# Patient Record
Sex: FEMALE | Race: WHITE | URBAN - METROPOLITAN AREA
[De-identification: names, ages, dates, MRNs, and addresses within clinical notes are randomized per-mention and may not be internally consistent; named-entity substitution may affect disease eponyms.]

---

## 2017-05-30 ENCOUNTER — INPATIENT (INPATIENT)
Facility: HOSPITAL | Age: 72
LOS: 3 days | Discharge: HOME | End: 2017-06-03
Attending: INTERNAL MEDICINE

## 2017-05-30 DIAGNOSIS — M81.0 AGE-RELATED OSTEOPOROSIS WITHOUT CURRENT PATHOLOGICAL FRACTURE: ICD-10-CM

## 2017-05-30 DIAGNOSIS — I10 ESSENTIAL (PRIMARY) HYPERTENSION: ICD-10-CM

## 2017-05-30 DIAGNOSIS — J44.1 CHRONIC OBSTRUCTIVE PULMONARY DISEASE WITH (ACUTE) EXACERBATION: ICD-10-CM

## 2017-05-30 DIAGNOSIS — E03.9 HYPOTHYROIDISM, UNSPECIFIED: ICD-10-CM

## 2017-05-30 DIAGNOSIS — H81.10 BENIGN PAROXYSMAL VERTIGO, UNSPECIFIED EAR: ICD-10-CM

## 2017-05-30 DIAGNOSIS — E78.5 HYPERLIPIDEMIA, UNSPECIFIED: ICD-10-CM

## 2017-05-30 DIAGNOSIS — I95.1 ORTHOSTATIC HYPOTENSION: ICD-10-CM

## 2017-06-28 DIAGNOSIS — K21.9 GASTRO-ESOPHAGEAL REFLUX DISEASE WITHOUT ESOPHAGITIS: ICD-10-CM

## 2017-06-28 DIAGNOSIS — Z96.642 PRESENCE OF LEFT ARTIFICIAL HIP JOINT: ICD-10-CM

## 2017-06-28 DIAGNOSIS — Z87.891 PERSONAL HISTORY OF NICOTINE DEPENDENCE: ICD-10-CM

## 2017-06-28 DIAGNOSIS — E78.5 HYPERLIPIDEMIA, UNSPECIFIED: ICD-10-CM

## 2017-06-28 DIAGNOSIS — T46.1X5A ADVERSE EFFECT OF CALCIUM-CHANNEL BLOCKERS, INITIAL ENCOUNTER: ICD-10-CM

## 2017-06-28 DIAGNOSIS — E03.9 HYPOTHYROIDISM, UNSPECIFIED: ICD-10-CM

## 2017-06-28 DIAGNOSIS — I95.2 HYPOTENSION DUE TO DRUGS: ICD-10-CM

## 2017-06-28 DIAGNOSIS — H81.10 BENIGN PAROXYSMAL VERTIGO, UNSPECIFIED EAR: ICD-10-CM

## 2017-06-28 DIAGNOSIS — R42 DIZZINESS AND GIDDINESS: ICD-10-CM

## 2017-06-28 DIAGNOSIS — I10 ESSENTIAL (PRIMARY) HYPERTENSION: ICD-10-CM

## 2017-06-28 DIAGNOSIS — R26.81 UNSTEADINESS ON FEET: ICD-10-CM

## 2017-06-28 PROBLEM — Z00.00 ENCOUNTER FOR PREVENTIVE HEALTH EXAMINATION: Status: ACTIVE | Noted: 2017-06-28

## 2022-12-02 ENCOUNTER — OUTPATIENT (OUTPATIENT)
Dept: OUTPATIENT SERVICES | Facility: HOSPITAL | Age: 77
LOS: 1 days | Discharge: HOME | End: 2022-12-02

## 2022-12-02 VITALS
SYSTOLIC BLOOD PRESSURE: 131 MMHG | WEIGHT: 240.08 LBS | DIASTOLIC BLOOD PRESSURE: 73 MMHG | RESPIRATION RATE: 17 BRPM | HEART RATE: 88 BPM | HEIGHT: 63 IN | TEMPERATURE: 98 F | OXYGEN SATURATION: 99 %

## 2022-12-02 VITALS
OXYGEN SATURATION: 98 % | DIASTOLIC BLOOD PRESSURE: 61 MMHG | SYSTOLIC BLOOD PRESSURE: 131 MMHG | HEART RATE: 87 BPM | RESPIRATION RATE: 18 BRPM

## 2022-12-02 DIAGNOSIS — Z98.890 OTHER SPECIFIED POSTPROCEDURAL STATES: Chronic | ICD-10-CM

## 2022-12-02 NOTE — ASU PATIENT PROFILE, ADULT - NSICDXPASTSURGICALHX_GEN_ALL_CORE_FT
PAST SURGICAL HISTORY:  H/O hand surgery WRIST   SCREW AND AURE LEFT    History of repair of hip fracture AURE

## 2022-12-02 NOTE — ASU PATIENT PROFILE, ADULT - TEACHING/LEARNING RELIGIOUS CONSIDERATIONS
Please refer to the [facility] ultrasound report in Ob Procedures for additional information regarding the visit to the [facility], [facility]  today  none

## 2022-12-02 NOTE — ASU PATIENT PROFILE, ADULT - NSICDXPASTMEDICALHX_GEN_ALL_CORE_FT
PAST MEDICAL HISTORY:  GERD (gastroesophageal reflux disease)     H/O insomnia     HTN (hypertension)     Hypothyroid     Osteoarthritis feet    Vertigo

## 2022-12-02 NOTE — ASU PATIENT PROFILE, ADULT - FALL HARM RISK - RISK INTERVENTIONS

## 2022-12-02 NOTE — ASU DISCHARGE PLAN (ADULT/PEDIATRIC) - NS MD DC FALL RISK RISK
For information on Fall & Injury Prevention, visit: [email or web address] OR  [email or web address] OR  https://www.cdc.gov/steadi/patient.html

## 2022-12-07 DIAGNOSIS — H25.11 AGE-RELATED NUCLEAR CATARACT, RIGHT EYE: ICD-10-CM

## 2022-12-07 DIAGNOSIS — I10 ESSENTIAL (PRIMARY) HYPERTENSION: ICD-10-CM

## 2022-12-09 ENCOUNTER — TRANSCRIPTION ENCOUNTER (OUTPATIENT)
Age: 77
End: 2022-12-09

## 2022-12-09 ENCOUNTER — OUTPATIENT (OUTPATIENT)
Dept: OUTPATIENT SERVICES | Facility: HOSPITAL | Age: 77
LOS: 1 days | Discharge: HOME | End: 2022-12-09

## 2022-12-09 VITALS
SYSTOLIC BLOOD PRESSURE: 148 MMHG | RESPIRATION RATE: 17 BRPM | OXYGEN SATURATION: 98 % | DIASTOLIC BLOOD PRESSURE: 67 MMHG | HEART RATE: 87 BPM

## 2022-12-09 VITALS
WEIGHT: 240.08 LBS | DIASTOLIC BLOOD PRESSURE: 65 MMHG | SYSTOLIC BLOOD PRESSURE: 123 MMHG | RESPIRATION RATE: 17 BRPM | TEMPERATURE: 98 F | OXYGEN SATURATION: 97 % | HEART RATE: 93 BPM | HEIGHT: 63 IN

## 2022-12-09 DIAGNOSIS — Z98.890 OTHER SPECIFIED POSTPROCEDURAL STATES: Chronic | ICD-10-CM

## 2022-12-09 NOTE — ASU PATIENT PROFILE, ADULT - FALL HARM RISK - HARM RISK INTERVENTIONS
Assistance with ambulation/Assistance OOB with selected safe patient handling equipment/Communicate Risk of Fall with Harm to all staff/Discuss with provider need for PT consult/Monitor gait and stability/Provide patient with walking aids - walker, cane, crutches/Reinforce activity limits and safety measures with patient and family/Sit up slowly, dangle for a short time, stand at bedside before walking/Tailored Fall Risk Interventions/Visual Cue: Yellow wristband and red socks/Bed in lowest position, wheels locked, appropriate side rails in place/Call bell, personal items and telephone in reach/Instruct patient to call for assistance before getting out of bed or chair/Non-slip footwear when patient is out of bed/[place] to call system/Physically safe environment - no spills, clutter or unnecessary equipment/Purposeful Proactive Rounding/Room/bathroom lighting operational, light cord in reach

## 2022-12-09 NOTE — ASU PATIENT PROFILE, ADULT - NSICDXPASTMEDICALHX_GEN_ALL_CORE_FT
PAST MEDICAL HISTORY:  Cataract     GERD (gastroesophageal reflux disease)     H/O insomnia     HTN (hypertension)     Hypothyroid     Obese     Osteoarthritis feet    Vertigo

## 2022-12-09 NOTE — ASU DISCHARGE PLAN (ADULT/PEDIATRIC) - NS MD DC FALL RISK RISK
For information on Fall & Injury Prevention, visit: [email or web address] OR  [email or web address] OR  https://www.cdc.gov/steadi/patient.html

## 2022-12-14 DIAGNOSIS — I10 ESSENTIAL (PRIMARY) HYPERTENSION: ICD-10-CM

## 2022-12-14 DIAGNOSIS — E66.9 OBESITY, UNSPECIFIED: ICD-10-CM

## 2022-12-14 DIAGNOSIS — H25.89 OTHER AGE-RELATED CATARACT: ICD-10-CM

## 2022-12-14 DIAGNOSIS — M19.90 UNSPECIFIED OSTEOARTHRITIS, UNSPECIFIED SITE: ICD-10-CM

## 2022-12-14 DIAGNOSIS — R42 DIZZINESS AND GIDDINESS: ICD-10-CM

## 2022-12-14 DIAGNOSIS — G47.33 OBSTRUCTIVE SLEEP APNEA (ADULT) (PEDIATRIC): ICD-10-CM

## 2022-12-14 DIAGNOSIS — K21.9 GASTRO-ESOPHAGEAL REFLUX DISEASE WITHOUT ESOPHAGITIS: ICD-10-CM

## 2022-12-14 DIAGNOSIS — E03.9 HYPOTHYROIDISM, UNSPECIFIED: ICD-10-CM

## 2024-02-21 ENCOUNTER — EMERGENCY (EMERGENCY)
Facility: HOSPITAL | Age: 79
LOS: 0 days | Discharge: HOME | End: 2024-02-21
Attending: EMERGENCY MEDICINE
Payer: MEDICARE

## 2024-02-21 VITALS
HEART RATE: 95 BPM | WEIGHT: 250 LBS | TEMPERATURE: 98 F | DIASTOLIC BLOOD PRESSURE: 55 MMHG | OXYGEN SATURATION: 98 % | SYSTOLIC BLOOD PRESSURE: 103 MMHG | RESPIRATION RATE: 20 BRPM

## 2024-02-21 VITALS
OXYGEN SATURATION: 98 % | SYSTOLIC BLOOD PRESSURE: 113 MMHG | HEART RATE: 96 BPM | DIASTOLIC BLOOD PRESSURE: 72 MMHG | RESPIRATION RATE: 20 BRPM

## 2024-02-21 DIAGNOSIS — E03.9 HYPOTHYROIDISM, UNSPECIFIED: ICD-10-CM

## 2024-02-21 DIAGNOSIS — N39.0 URINARY TRACT INFECTION, SITE NOT SPECIFIED: ICD-10-CM

## 2024-02-21 DIAGNOSIS — R14.0 ABDOMINAL DISTENSION (GASEOUS): ICD-10-CM

## 2024-02-21 DIAGNOSIS — I10 ESSENTIAL (PRIMARY) HYPERTENSION: ICD-10-CM

## 2024-02-21 DIAGNOSIS — I48.91 UNSPECIFIED ATRIAL FIBRILLATION: ICD-10-CM

## 2024-02-21 DIAGNOSIS — Z98.890 OTHER SPECIFIED POSTPROCEDURAL STATES: Chronic | ICD-10-CM

## 2024-02-21 DIAGNOSIS — R06.02 SHORTNESS OF BREATH: ICD-10-CM

## 2024-02-21 DIAGNOSIS — K21.9 GASTRO-ESOPHAGEAL REFLUX DISEASE WITHOUT ESOPHAGITIS: ICD-10-CM

## 2024-02-21 PROBLEM — H26.9 UNSPECIFIED CATARACT: Chronic | Status: ACTIVE | Noted: 2022-12-09

## 2024-02-21 PROBLEM — E66.9 OBESITY, UNSPECIFIED: Chronic | Status: ACTIVE | Noted: 2022-12-09

## 2024-02-21 PROBLEM — R42 DIZZINESS AND GIDDINESS: Chronic | Status: ACTIVE | Noted: 2022-12-02

## 2024-02-21 PROBLEM — Z87.898 PERSONAL HISTORY OF OTHER SPECIFIED CONDITIONS: Chronic | Status: ACTIVE | Noted: 2022-12-02

## 2024-02-21 PROBLEM — M19.90 UNSPECIFIED OSTEOARTHRITIS, UNSPECIFIED SITE: Chronic | Status: ACTIVE | Noted: 2022-12-02

## 2024-02-21 LAB
ALBUMIN SERPL ELPH-MCNC: 4.3 G/DL — SIGNIFICANT CHANGE UP (ref 3.5–5.2)
ALP SERPL-CCNC: 60 U/L — SIGNIFICANT CHANGE UP (ref 30–115)
ALT FLD-CCNC: 10 U/L — SIGNIFICANT CHANGE UP (ref 0–41)
ANION GAP SERPL CALC-SCNC: 13 MMOL/L — SIGNIFICANT CHANGE UP (ref 7–14)
APPEARANCE UR: CLEAR — SIGNIFICANT CHANGE UP
AST SERPL-CCNC: 16 U/L — SIGNIFICANT CHANGE UP (ref 0–41)
BASOPHILS # BLD AUTO: 0.07 K/UL — SIGNIFICANT CHANGE UP (ref 0–0.2)
BASOPHILS NFR BLD AUTO: 0.9 % — SIGNIFICANT CHANGE UP (ref 0–1)
BILIRUB SERPL-MCNC: 0.3 MG/DL — SIGNIFICANT CHANGE UP (ref 0.2–1.2)
BILIRUB UR-MCNC: NEGATIVE — SIGNIFICANT CHANGE UP
BUN SERPL-MCNC: 13 MG/DL — SIGNIFICANT CHANGE UP (ref 10–20)
CALCIUM SERPL-MCNC: 9.3 MG/DL — SIGNIFICANT CHANGE UP (ref 8.4–10.5)
CHLORIDE SERPL-SCNC: 105 MMOL/L — SIGNIFICANT CHANGE UP (ref 98–110)
CO2 SERPL-SCNC: 23 MMOL/L — SIGNIFICANT CHANGE UP (ref 17–32)
COLOR SPEC: YELLOW — SIGNIFICANT CHANGE UP
CREAT SERPL-MCNC: 0.8 MG/DL — SIGNIFICANT CHANGE UP (ref 0.7–1.5)
DIFF PNL FLD: ABNORMAL
EGFR: 74 ML/MIN/1.73M2 — SIGNIFICANT CHANGE UP
EOSINOPHIL # BLD AUTO: 0.08 K/UL — SIGNIFICANT CHANGE UP (ref 0–0.7)
EOSINOPHIL NFR BLD AUTO: 1 % — SIGNIFICANT CHANGE UP (ref 0–8)
GLUCOSE SERPL-MCNC: 105 MG/DL — HIGH (ref 70–99)
GLUCOSE UR QL: NEGATIVE MG/DL — SIGNIFICANT CHANGE UP
HCT VFR BLD CALC: 41.6 % — SIGNIFICANT CHANGE UP (ref 37–47)
HGB BLD-MCNC: 13.4 G/DL — SIGNIFICANT CHANGE UP (ref 12–16)
IMM GRANULOCYTES NFR BLD AUTO: 0.2 % — SIGNIFICANT CHANGE UP (ref 0.1–0.3)
KETONES UR-MCNC: NEGATIVE MG/DL — SIGNIFICANT CHANGE UP
LEUKOCYTE ESTERASE UR-ACNC: NEGATIVE — SIGNIFICANT CHANGE UP
LIDOCAIN IGE QN: 17 U/L — SIGNIFICANT CHANGE UP (ref 7–60)
LYMPHOCYTES # BLD AUTO: 1.85 K/UL — SIGNIFICANT CHANGE UP (ref 1.2–3.4)
LYMPHOCYTES # BLD AUTO: 23 % — SIGNIFICANT CHANGE UP (ref 20.5–51.1)
MCHC RBC-ENTMCNC: 27.8 PG — SIGNIFICANT CHANGE UP (ref 27–31)
MCHC RBC-ENTMCNC: 32.2 G/DL — SIGNIFICANT CHANGE UP (ref 32–37)
MCV RBC AUTO: 86.3 FL — SIGNIFICANT CHANGE UP (ref 81–99)
MONOCYTES # BLD AUTO: 0.61 K/UL — HIGH (ref 0.1–0.6)
MONOCYTES NFR BLD AUTO: 7.6 % — SIGNIFICANT CHANGE UP (ref 1.7–9.3)
NEUTROPHILS # BLD AUTO: 5.41 K/UL — SIGNIFICANT CHANGE UP (ref 1.4–6.5)
NEUTROPHILS NFR BLD AUTO: 67.3 % — SIGNIFICANT CHANGE UP (ref 42.2–75.2)
NITRITE UR-MCNC: POSITIVE
NRBC # BLD: 0 /100 WBCS — SIGNIFICANT CHANGE UP (ref 0–0)
NT-PROBNP SERPL-SCNC: 998 PG/ML — HIGH (ref 0–300)
PH UR: 6.5 — SIGNIFICANT CHANGE UP (ref 5–8)
PLATELET # BLD AUTO: 309 K/UL — SIGNIFICANT CHANGE UP (ref 130–400)
PMV BLD: 9.9 FL — SIGNIFICANT CHANGE UP (ref 7.4–10.4)
POTASSIUM SERPL-MCNC: 3.6 MMOL/L — SIGNIFICANT CHANGE UP (ref 3.5–5)
POTASSIUM SERPL-SCNC: 3.6 MMOL/L — SIGNIFICANT CHANGE UP (ref 3.5–5)
PROT SERPL-MCNC: 7.1 G/DL — SIGNIFICANT CHANGE UP (ref 6–8)
PROT UR-MCNC: 300 MG/DL
RBC # BLD: 4.82 M/UL — SIGNIFICANT CHANGE UP (ref 4.2–5.4)
RBC # FLD: 12.8 % — SIGNIFICANT CHANGE UP (ref 11.5–14.5)
SODIUM SERPL-SCNC: 141 MMOL/L — SIGNIFICANT CHANGE UP (ref 135–146)
SP GR SPEC: >1.03 — HIGH (ref 1–1.03)
UROBILINOGEN FLD QL: 0.2 MG/DL — SIGNIFICANT CHANGE UP (ref 0.2–1)
WBC # BLD: 8.04 K/UL — SIGNIFICANT CHANGE UP (ref 4.8–10.8)
WBC # FLD AUTO: 8.04 K/UL — SIGNIFICANT CHANGE UP (ref 4.8–10.8)

## 2024-02-21 PROCEDURE — 81001 URINALYSIS AUTO W/SCOPE: CPT

## 2024-02-21 PROCEDURE — 87077 CULTURE AEROBIC IDENTIFY: CPT

## 2024-02-21 PROCEDURE — 99285 EMERGENCY DEPT VISIT HI MDM: CPT | Mod: 25

## 2024-02-21 PROCEDURE — 99285 EMERGENCY DEPT VISIT HI MDM: CPT

## 2024-02-21 PROCEDURE — 74177 CT ABD & PELVIS W/CONTRAST: CPT | Mod: 26,MA

## 2024-02-21 PROCEDURE — 93010 ELECTROCARDIOGRAM REPORT: CPT

## 2024-02-21 PROCEDURE — 87086 URINE CULTURE/COLONY COUNT: CPT

## 2024-02-21 PROCEDURE — 80053 COMPREHEN METABOLIC PANEL: CPT

## 2024-02-21 PROCEDURE — 71045 X-RAY EXAM CHEST 1 VIEW: CPT

## 2024-02-21 PROCEDURE — 71045 X-RAY EXAM CHEST 1 VIEW: CPT | Mod: 26

## 2024-02-21 PROCEDURE — 71046 X-RAY EXAM CHEST 2 VIEWS: CPT

## 2024-02-21 PROCEDURE — 36415 COLL VENOUS BLD VENIPUNCTURE: CPT

## 2024-02-21 PROCEDURE — 85025 COMPLETE CBC W/AUTO DIFF WBC: CPT

## 2024-02-21 PROCEDURE — 87186 SC STD MICRODIL/AGAR DIL: CPT

## 2024-02-21 PROCEDURE — 83690 ASSAY OF LIPASE: CPT

## 2024-02-21 PROCEDURE — 83880 ASSAY OF NATRIURETIC PEPTIDE: CPT

## 2024-02-21 PROCEDURE — 74177 CT ABD & PELVIS W/CONTRAST: CPT | Mod: MA

## 2024-02-21 PROCEDURE — 93005 ELECTROCARDIOGRAM TRACING: CPT

## 2024-02-21 RX ORDER — CEFPODOXIME PROXETIL 100 MG
1 TABLET ORAL
Qty: 14 | Refills: 0
Start: 2024-02-21 | End: 2024-02-27

## 2024-02-21 RX ORDER — CEFPODOXIME PROXETIL 100 MG
100 TABLET ORAL ONCE
Refills: 0 | Status: COMPLETED | OUTPATIENT
Start: 2024-02-21 | End: 2024-02-21

## 2024-02-21 RX ADMIN — Medication 100 MILLIGRAM(S): at 21:02

## 2024-02-21 NOTE — ED PROVIDER NOTE - ATTENDING APP SHARED VISIT CONTRIBUTION OF CARE
I have personally performed a history and physical exam on this patient and personally directed the management of the patient. Patient is a 78-year-old female presents for evaluation of abdominal pain she has a past medical history of hypertension GERD hypothyroidism presents with abdominal bloating onset months in addition also shortness of breath which is improving  Patient denies any chest pain diaphoresis nausea vomiting or diarrhea denies any back pain    On physical exam patient is normocephalic atraumatic pupils equal round react light accommodation extraocular muscles intact oropharynx clear chest clear to station bilaterally abdomen soft nontender nondistended bowel sounds positive no guarding no rebound radial pulse 2+ pedal pulse 2+ no focal deficits noted no edema noted    Assessment plan patient presents for evaluation of worsening shortness of breath with routine EKGs, independent ambulation not consistent with STEMI however patient does have atrial fibrillation and rate of 89 bpm no QTc prolongation noted routine chest x-ray provide pending evaluation nonexistent with pneumonia or pneumothorax lipase negative BNP 9 9 no large elevation white blood cell count patient stable at this time given complaints of abdominal pain maintaining CT abdomen pelvis I will continue to monitor at this time

## 2024-02-21 NOTE — ED PROVIDER NOTE - CLINICAL SUMMARY MEDICAL DECISION MAKING FREE TEXT BOX
patient presents for evaluation of worsening shortness of breath with routine EKGs, independent ambulation not consistent with STEMI however patient does have atrial fibrillation and rate of 89 bpm no QTc prolongation noted routine chest x-ray provide pending evaluation nonexistent with pneumonia or pneumothorax lipase negative BNP 9 9 no large elevation white blood cell count patient stable at this time given complaints of abdominal pain maintaining CT abdomen pelvis I will continue to monitor at this time

## 2024-02-21 NOTE — ED PROVIDER NOTE - OBJECTIVE STATEMENT
patient is a 77yo female PMH htn, gerd, hypothyroid complaining of abdominal bloating and shortness of breath. pt's son reports pt has been very sensitive to aerosol sprays and feels SOB when she smells them. has had intermittent abdominal bloating x3 weeks. reports BM today, passing gas. denies chest pain, cough, leg pain/ swelling, n,v,d.

## 2024-02-21 NOTE — ED PROVIDER NOTE - PROGRESS NOTE DETAILS
results discussed with patient and patient son.     will treat patient for urine. patient does report frequency to urinate .    patient is asking for gi

## 2024-02-21 NOTE — ED PROVIDER NOTE - PHYSICAL EXAMINATION
CONST: Well appearing in NAD  CARD: Normal S1 S2; Normal rate and rhythm  RESP: Equal BS B/L, No wheezes, rhonchi or rales. No distress  GI: Soft, non-tender, non-distended.  MS: Normal ROM in all extremities. No midline spinal tenderness.  SKIN: Warm, dry, no acute rashes. Good turgor  NEURO: A&Ox3, No focal deficits. Strength 5/5

## 2024-02-21 NOTE — ED PROVIDER NOTE - PATIENT PORTAL LINK FT
You can access the FollowMyHealth Patient Portal offered by [facility] by registering at the following website: [email or web address]. By joining [employer]’s FollowMyHealth portal, you will also be able to view your health information using other applications (apps) compatible with our system.

## 2024-05-30 ENCOUNTER — INPATIENT (INPATIENT)
Facility: HOSPITAL | Age: 79
LOS: 6 days | Discharge: HOME | DRG: 204 | End: 2024-06-06
Attending: INTERNAL MEDICINE | Admitting: STUDENT IN AN ORGANIZED HEALTH CARE EDUCATION/TRAINING PROGRAM
Payer: MEDICARE

## 2024-05-30 VITALS
RESPIRATION RATE: 18 BRPM | OXYGEN SATURATION: 99 % | HEART RATE: 74 BPM | TEMPERATURE: 98 F | SYSTOLIC BLOOD PRESSURE: 147 MMHG | DIASTOLIC BLOOD PRESSURE: 87 MMHG

## 2024-05-30 DIAGNOSIS — R06.02 SHORTNESS OF BREATH: ICD-10-CM

## 2024-05-30 DIAGNOSIS — Z98.890 OTHER SPECIFIED POSTPROCEDURAL STATES: Chronic | ICD-10-CM

## 2024-05-30 LAB
ALBUMIN SERPL ELPH-MCNC: 4.3 G/DL — SIGNIFICANT CHANGE UP (ref 3.5–5.2)
ALP SERPL-CCNC: 71 U/L — SIGNIFICANT CHANGE UP (ref 30–115)
ALT FLD-CCNC: 7 U/L — SIGNIFICANT CHANGE UP (ref 0–41)
ANION GAP SERPL CALC-SCNC: 9 MMOL/L — SIGNIFICANT CHANGE UP (ref 7–14)
AST SERPL-CCNC: 14 U/L — SIGNIFICANT CHANGE UP (ref 0–41)
BASE EXCESS BLDV CALC-SCNC: 0.8 MMOL/L — SIGNIFICANT CHANGE UP (ref -2–3)
BASOPHILS # BLD AUTO: 0.07 K/UL — SIGNIFICANT CHANGE UP (ref 0–0.2)
BASOPHILS NFR BLD AUTO: 0.8 % — SIGNIFICANT CHANGE UP (ref 0–1)
BILIRUB SERPL-MCNC: 0.5 MG/DL — SIGNIFICANT CHANGE UP (ref 0.2–1.2)
BUN SERPL-MCNC: 16 MG/DL — SIGNIFICANT CHANGE UP (ref 10–20)
CALCIUM SERPL-MCNC: 9.1 MG/DL — SIGNIFICANT CHANGE UP (ref 8.4–10.5)
CHLORIDE SERPL-SCNC: 105 MMOL/L — SIGNIFICANT CHANGE UP (ref 98–110)
CO2 SERPL-SCNC: 25 MMOL/L — SIGNIFICANT CHANGE UP (ref 17–32)
CREAT SERPL-MCNC: 0.7 MG/DL — SIGNIFICANT CHANGE UP (ref 0.7–1.5)
EGFR: 87 ML/MIN/1.73M2 — SIGNIFICANT CHANGE UP
EOSINOPHIL # BLD AUTO: 0.1 K/UL — SIGNIFICANT CHANGE UP (ref 0–0.7)
EOSINOPHIL NFR BLD AUTO: 1.1 % — SIGNIFICANT CHANGE UP (ref 0–8)
FLUAV AG NPH QL: SIGNIFICANT CHANGE UP
FLUBV AG NPH QL: SIGNIFICANT CHANGE UP
GAS PNL BLDV: SIGNIFICANT CHANGE UP
GAS PNL BLDV: SIGNIFICANT CHANGE UP
GLUCOSE SERPL-MCNC: 104 MG/DL — HIGH (ref 70–99)
HCO3 BLDV-SCNC: 27 MMOL/L — SIGNIFICANT CHANGE UP (ref 22–29)
HCT VFR BLD CALC: 35.3 % — LOW (ref 37–47)
HGB BLD-MCNC: 10.5 G/DL — LOW (ref 12–16)
IMM GRANULOCYTES NFR BLD AUTO: 0.4 % — HIGH (ref 0.1–0.3)
LACTATE BLDV-MCNC: 1 MMOL/L — SIGNIFICANT CHANGE UP (ref 0.5–2)
LYMPHOCYTES # BLD AUTO: 1.25 K/UL — SIGNIFICANT CHANGE UP (ref 1.2–3.4)
LYMPHOCYTES # BLD AUTO: 13.9 % — LOW (ref 20.5–51.1)
MCHC RBC-ENTMCNC: 23.9 PG — LOW (ref 27–31)
MCHC RBC-ENTMCNC: 29.7 G/DL — LOW (ref 32–37)
MCV RBC AUTO: 80.2 FL — LOW (ref 81–99)
MONOCYTES # BLD AUTO: 0.58 K/UL — SIGNIFICANT CHANGE UP (ref 0.1–0.6)
MONOCYTES NFR BLD AUTO: 6.4 % — SIGNIFICANT CHANGE UP (ref 1.7–9.3)
NEUTROPHILS # BLD AUTO: 6.98 K/UL — HIGH (ref 1.4–6.5)
NEUTROPHILS NFR BLD AUTO: 77.4 % — HIGH (ref 42.2–75.2)
NRBC # BLD: 0 /100 WBCS — SIGNIFICANT CHANGE UP (ref 0–0)
NT-PROBNP SERPL-SCNC: 1975 PG/ML — HIGH (ref 0–300)
PCO2 BLDV: 49 MMHG — HIGH (ref 39–42)
PH BLDV: 7.35 — SIGNIFICANT CHANGE UP (ref 7.32–7.43)
PLATELET # BLD AUTO: 301 K/UL — SIGNIFICANT CHANGE UP (ref 130–400)
PMV BLD: 10.5 FL — HIGH (ref 7.4–10.4)
PO2 BLDV: 42 MMHG — SIGNIFICANT CHANGE UP (ref 25–45)
POTASSIUM SERPL-MCNC: 3.8 MMOL/L — SIGNIFICANT CHANGE UP (ref 3.5–5)
POTASSIUM SERPL-SCNC: 3.8 MMOL/L — SIGNIFICANT CHANGE UP (ref 3.5–5)
PROT SERPL-MCNC: 6.9 G/DL — SIGNIFICANT CHANGE UP (ref 6–8)
RBC # BLD: 4.4 M/UL — SIGNIFICANT CHANGE UP (ref 4.2–5.4)
RBC # FLD: 16.4 % — HIGH (ref 11.5–14.5)
RSV RNA NPH QL NAA+NON-PROBE: SIGNIFICANT CHANGE UP
SAO2 % BLDV: 68.1 % — SIGNIFICANT CHANGE UP (ref 67–88)
SARS-COV-2 RNA SPEC QL NAA+PROBE: SIGNIFICANT CHANGE UP
SODIUM SERPL-SCNC: 139 MMOL/L — SIGNIFICANT CHANGE UP (ref 135–146)
WBC # BLD: 9.02 K/UL — SIGNIFICANT CHANGE UP (ref 4.8–10.8)
WBC # FLD AUTO: 9.02 K/UL — SIGNIFICANT CHANGE UP (ref 4.8–10.8)

## 2024-05-30 PROCEDURE — 83735 ASSAY OF MAGNESIUM: CPT

## 2024-05-30 PROCEDURE — 0241U: CPT

## 2024-05-30 PROCEDURE — 85027 COMPLETE CBC AUTOMATED: CPT

## 2024-05-30 PROCEDURE — 80048 BASIC METABOLIC PNL TOTAL CA: CPT

## 2024-05-30 PROCEDURE — 83550 IRON BINDING TEST: CPT

## 2024-05-30 PROCEDURE — 93306 TTE W/DOPPLER COMPLETE: CPT

## 2024-05-30 PROCEDURE — 97162 PT EVAL MOD COMPLEX 30 MIN: CPT | Mod: GP

## 2024-05-30 PROCEDURE — 71045 X-RAY EXAM CHEST 1 VIEW: CPT | Mod: 26

## 2024-05-30 PROCEDURE — 97110 THERAPEUTIC EXERCISES: CPT | Mod: GP

## 2024-05-30 PROCEDURE — 82746 ASSAY OF FOLIC ACID SERUM: CPT

## 2024-05-30 PROCEDURE — 83540 ASSAY OF IRON: CPT

## 2024-05-30 PROCEDURE — 99285 EMERGENCY DEPT VISIT HI MDM: CPT

## 2024-05-30 PROCEDURE — 75574 CT ANGIO HRT W/3D IMAGE: CPT | Mod: MC

## 2024-05-30 PROCEDURE — 84443 ASSAY THYROID STIM HORMONE: CPT

## 2024-05-30 PROCEDURE — 84484 ASSAY OF TROPONIN QUANT: CPT

## 2024-05-30 PROCEDURE — 97116 GAIT TRAINING THERAPY: CPT | Mod: GP

## 2024-05-30 PROCEDURE — 80053 COMPREHEN METABOLIC PANEL: CPT

## 2024-05-30 PROCEDURE — 71045 X-RAY EXAM CHEST 1 VIEW: CPT

## 2024-05-30 PROCEDURE — 84439 ASSAY OF FREE THYROXINE: CPT

## 2024-05-30 PROCEDURE — 36415 COLL VENOUS BLD VENIPUNCTURE: CPT

## 2024-05-30 PROCEDURE — 82728 ASSAY OF FERRITIN: CPT

## 2024-05-30 PROCEDURE — 93970 EXTREMITY STUDY: CPT

## 2024-05-30 PROCEDURE — 93005 ELECTROCARDIOGRAM TRACING: CPT

## 2024-05-30 PROCEDURE — 99223 1ST HOSP IP/OBS HIGH 75: CPT

## 2024-05-30 PROCEDURE — 82607 VITAMIN B-12: CPT

## 2024-05-30 PROCEDURE — 85025 COMPLETE CBC W/AUTO DIFF WBC: CPT

## 2024-05-30 RX ORDER — ACETAMINOPHEN 500 MG
2 TABLET ORAL
Qty: 0 | Refills: 0 | DISCHARGE

## 2024-05-30 RX ORDER — FUROSEMIDE 40 MG
40 TABLET ORAL
Refills: 0 | Status: DISCONTINUED | OUTPATIENT
Start: 2024-05-30 | End: 2024-06-02

## 2024-05-30 RX ORDER — LOSARTAN POTASSIUM 100 MG/1
50 TABLET, FILM COATED ORAL DAILY
Refills: 0 | Status: DISCONTINUED | OUTPATIENT
Start: 2024-05-30 | End: 2024-06-06

## 2024-05-30 RX ORDER — PANTOPRAZOLE SODIUM 20 MG/1
40 TABLET, DELAYED RELEASE ORAL
Refills: 0 | Status: DISCONTINUED | OUTPATIENT
Start: 2024-05-30 | End: 2024-06-06

## 2024-05-30 RX ORDER — LEVOTHYROXINE SODIUM 125 MCG
0 TABLET ORAL
Qty: 0 | Refills: 0 | DISCHARGE

## 2024-05-30 RX ORDER — LANOLIN ALCOHOL/MO/W.PET/CERES
0 CREAM (GRAM) TOPICAL
Qty: 0 | Refills: 0 | DISCHARGE

## 2024-05-30 RX ORDER — APIXABAN 2.5 MG/1
1 TABLET, FILM COATED ORAL
Refills: 0 | DISCHARGE

## 2024-05-30 RX ORDER — METOPROLOL TARTRATE 50 MG
1 TABLET ORAL
Refills: 0 | DISCHARGE

## 2024-05-30 RX ORDER — OXYCODONE AND ACETAMINOPHEN 5; 325 MG/1; MG/1
1 TABLET ORAL
Refills: 0 | DISCHARGE

## 2024-05-30 RX ORDER — ATORVASTATIN CALCIUM 80 MG/1
40 TABLET, FILM COATED ORAL AT BEDTIME
Refills: 0 | Status: DISCONTINUED | OUTPATIENT
Start: 2024-05-30 | End: 2024-06-06

## 2024-05-30 RX ORDER — POLYETHYLENE GLYCOL 3350 17 G/17G
17 POWDER, FOR SOLUTION ORAL
Refills: 0 | Status: DISCONTINUED | OUTPATIENT
Start: 2024-05-30 | End: 2024-06-06

## 2024-05-30 RX ORDER — ONDANSETRON 8 MG/1
4 TABLET, FILM COATED ORAL EVERY 8 HOURS
Refills: 0 | Status: DISCONTINUED | OUTPATIENT
Start: 2024-05-30 | End: 2024-06-06

## 2024-05-30 RX ORDER — OMEPRAZOLE 10 MG/1
1 CAPSULE, DELAYED RELEASE ORAL
Qty: 0 | Refills: 0 | DISCHARGE

## 2024-05-30 RX ORDER — CHOLECALCIFEROL (VITAMIN D3) 125 MCG
1 CAPSULE ORAL
Qty: 0 | Refills: 0 | DISCHARGE

## 2024-05-30 RX ORDER — OXYCODONE HYDROCHLORIDE 5 MG/1
0 TABLET ORAL
Qty: 0 | Refills: 0 | DISCHARGE

## 2024-05-30 RX ORDER — SENNA PLUS 8.6 MG/1
2 TABLET ORAL AT BEDTIME
Refills: 0 | Status: DISCONTINUED | OUTPATIENT
Start: 2024-05-30 | End: 2024-06-06

## 2024-05-30 RX ORDER — APIXABAN 2.5 MG/1
5 TABLET, FILM COATED ORAL
Refills: 0 | Status: DISCONTINUED | OUTPATIENT
Start: 2024-05-30 | End: 2024-06-06

## 2024-05-30 RX ORDER — METOPROLOL TARTRATE 50 MG
25 TABLET ORAL
Refills: 0 | Status: DISCONTINUED | OUTPATIENT
Start: 2024-05-30 | End: 2024-06-03

## 2024-05-30 RX ORDER — AMLODIPINE BESYLATE 2.5 MG/1
0 TABLET ORAL
Qty: 0 | Refills: 0 | DISCHARGE

## 2024-05-30 RX ORDER — CHOLECALCIFEROL (VITAMIN D3) 125 MCG
2000 CAPSULE ORAL ONCE
Refills: 0 | Status: COMPLETED | OUTPATIENT
Start: 2024-05-30 | End: 2024-05-30

## 2024-05-30 RX ORDER — LEVOTHYROXINE SODIUM 125 MCG
100 TABLET ORAL DAILY
Refills: 0 | Status: DISCONTINUED | OUTPATIENT
Start: 2024-05-30 | End: 2024-06-06

## 2024-05-30 RX ORDER — LANOLIN ALCOHOL/MO/W.PET/CERES
3 CREAM (GRAM) TOPICAL AT BEDTIME
Refills: 0 | Status: DISCONTINUED | OUTPATIENT
Start: 2024-05-30 | End: 2024-06-06

## 2024-05-30 RX ADMIN — ATORVASTATIN CALCIUM 40 MILLIGRAM(S): 80 TABLET, FILM COATED ORAL at 21:24

## 2024-05-30 RX ADMIN — APIXABAN 5 MILLIGRAM(S): 2.5 TABLET, FILM COATED ORAL at 18:27

## 2024-05-30 RX ADMIN — SENNA PLUS 2 TABLET(S): 8.6 TABLET ORAL at 21:24

## 2024-05-30 RX ADMIN — Medication 40 MILLIGRAM(S): at 18:27

## 2024-05-30 RX ADMIN — Medication 25 MILLIGRAM(S): at 18:27

## 2024-05-30 RX ADMIN — Medication 2000 UNIT(S): at 21:24

## 2024-05-30 NOTE — ED ADULT NURSE NOTE - OBJECTIVE STATEMENT
79 yr old female, presenting to ED c/o SOB. Pt c/o intermittent SOB x1 day. Denies n/v/d/fevers/chills, able to speak in full sentences.     Fall precautions implemented, BA in place, red socks utilized.

## 2024-05-30 NOTE — ED PROVIDER NOTE - PROGRESS NOTE DETAILS
MM Resident MDM: Patient with progressive leg swelling, shortness of breath worse with laying flat, elevated BNP, chest x-ray with possible vascular congestion, symptoms consistent with CHF/fluid overload.  Will admit to hospital for management.Less likely PE given patient compliant with Eliquis, not hypoxic, not tachycardic, no recent travel or immobilization.  Less likely pneumonia given x-ray.  No elevated white count.

## 2024-05-30 NOTE — ED PROVIDER NOTE - CLINICAL SUMMARY MEDICAL DECISION MAKING FREE TEXT BOX
79Y/F with Pmhx of Obesity, Cataract, Hypothyroid, Vertigo, Insomnia, GERD, HTN, Osteoarthritis, H/O arm surgery, A-fib on Eliquis presented to the ED for the evaluation of shortness of breath and bilateral LE edema. The patient has had LE edema for 2 years. She was having SOB for 2 months on and off,  mostly exertional, worsening for last 3-4 days. 3 month ago, the patient visited her PCP and was diagnosed with Afib, started on Eliquis and Metoprolol. She was planned to get CCTA today AM. For this, she was prescribed Metoprolol tartrate 100mg last night. After the dose, her SOB got worse and therefore she presented to ED today. The patient  also endorses congested sensation in her throat. The patient is chronically constipated. Denies any change in the diet. Denied chest pain, fever, chills, N/V/D, abdominal pain, sick contacts, cough, recent travel, headache, dizziness or LOC.  CXR: B/L Congestion, Left pleural effusion(official read awaited).  EKG: Afib , HR 65.  CHF exacerbation.  The patient is being admitted to medicine for the further management.

## 2024-05-30 NOTE — ED PROVIDER NOTE - PHYSICAL EXAMINATION
Vital Signs: I have reviewed the initial vital signs.  Constitutional: chronically ill appearing no acute distress  HEENT: Airway patent, moist MM, EOMI,   CV: regular rate, regular rhythm, well-perfused extremities,   Lungs: Clear to ascultation bilaterally, no crackles, no wheezes, Poor inspiratory effort  ABD: Non-tender, Non-distended,   MSK: Neck supple, nontender, normal range of motion, no stepoff. Chest nontender. Back nontender   INTEG: Skin warm, dry, no rash.  NEURO: A&Ox3, moving all extremities, normal speech

## 2024-05-30 NOTE — ED ADULT NURSE NOTE - NSFALLHARMRISKINTERV_ED_ALL_ED
In Encounter please print [employer] FMLA - PLEASE PRINT out of the Med Info Form  Add any additional information if needed and once the FMLA Form is reviewed; please sign and date.   Then please send back to Forms Completion via fax @ [phone]  Thank you, Forms Completion  Assistance OOB with selected safe patient handling equipment if applicable/Assistance with ambulation/Communicate risk of Fall with Harm to all staff, patient, and family/Provide visual cue: red socks, yellow wristband, yellow gown, etc/Reinforce activity limits and safety measures with patient and family/Bed in lowest position, wheels locked, appropriate side rails in place/Call bell, personal items and telephone in reach/Instruct patient to call for assistance before getting out of bed/chair/stretcher/Non-slip footwear applied when patient is off stretcher/[place] to call system/Physically safe environment - no spills, clutter or unnecessary equipment/Purposeful Proactive Rounding/Room/bathroom lighting operational, light cord in reach

## 2024-05-30 NOTE — H&P ADULT - ASSESSMENT
A 79Y/F with Pmhx of Obesity, Cataract, Hypothyroid, Vertigo, Insomnia, GERD, HTN, Osteoarthritis, H/O hand surgery, A-fib on Eliquis presented to the ED for the evaluation of shortness of breath, mostly exertional and worsened by lying flat for last few days, worsening since last night and a/w bilateral LE edema. Denied chest pain, fever, chills, N/V/D, abdominal pain, sick contacts, cough, recent travel, headache, dizziness or LOC.     #Dyspnea likely 2/2 Acute exacerbation of CHF   -[id number](was 998 in feb, 2024), VBG: pCO2 49  -CXR: B/L Congestion, Left pleural effusion(official read awaited)  -EKG: Afib , HR 65    Plan:  -will start on Lasix 40mg IV BID>>reasses qShift to adjust the dosing  -Strict I&O  -Daily weights  -continue low salt diet  -f/u Echocardiogram  -PT consult      #Microcytic anemia  -No h/o Melena, hematuria or PV bleed  -Hb 10.5(baseline around 12-13), MCV 80.2    Plan:  -f/u Anemia work up(iron studies, B12 and Folate)    #Chronic Afib, rate controlled  --EKG: Afib , HR 65    Plan:  -Continue with     #H/O HTN  #H/O Hypothyroidism    #MISC:  -DVT ppx:  -GI ppx:   -Diet: DASH  -Activity: IAT  -Dispo: Admit to tele, Continue diuresis   A 79Y/F with Pmhx of Obesity, Cataract, Hypothyroid, Vertigo, Insomnia, GERD, HTN, Osteoarthritis, H/O arm surgery, A-fib on Eliquis presented to the ED for the evaluation of shortness of breath and bilateral LE edema. The patient has had LE edema for 2 years. She was having SOB for 2 months on and off,  mostly exertional, worsening for last 3-4 days. 1 month ago, the patient visited her PCP and was diagnosed with Afib, started on Eliquis and Metoprolol. She was planned to get CCTA today AM. For this, she was prescribed Metoprolol tartrate 100mg last night. After the dose, her SOB got worse and therefore she presented to ED today.    #Dyspnea likely 2/2 Acute exacerbation of CHF   -[id number](was 998 in feb, 2024), VBG: pCO2 49  -CXR: B/L Congestion, Left pleural effusion(official read awaited)  -EKG: Afib , HR 65    Plan:  -will start on Lasix 40mg IV BID>>reassess qShift to adjust the dosing  -Strict I&O  -Daily weights  -continue low salt diet  -f/u Echocardiogram  -PT consult      #Microcytic anemia  -No h/o Melena, hematuria or PV bleed  -Hb 10.5(baseline around 12-13), MCV 80.2    Plan:  -f/u Anemia work up(iron studies, B12 and Folate)    #Chronic Afib, rate controlled  -EKG: Afib , HR 65    Plan:  -Continue with Eliquis 5mg BID and Metoprolol 25mg BID    #H/O HTN  #H/O Hypothyroidism  #H/O HLD  -will hold Amlodipine in the setting of worsening LE edema  -will start on Losartan while inpatient  -continue with Levothyroxine, Statin    #?LE OA  -On percocet q6hr prn     #MISC:  -DVT ppx: Eliquis  -GI ppx: PPI  -Diet: DASH  -Activity: IAT  -Dispo: Admit to tele, Continue diuresis   A 79Y/F with Pmhx of Obesity, Cataract, Hypothyroid, Vertigo, Insomnia, GERD, HTN, Osteoarthritis, H/O arm surgery, A-fib on Eliquis presented to the ED for the evaluation of shortness of breath and bilateral LE edema. The patient has had LE edema for 2 years. She was having SOB for 2 months on and off,  mostly exertional, worsening for last 3-4 days. 1 month ago, the patient visited her PCP and was diagnosed with Afib, started on Eliquis and Metoprolol. She was planned to get CCTA today AM. For this, she was prescribed Metoprolol tartrate 100mg last night. After the dose, her SOB got worse and therefore she presented to ED today.    #Dyspnea likely 2/2 Acute exacerbation of CHF   -[id number](was 998 in feb, 2024), VBG: pCO2 49  -CXR: B/L Congestion, Left pleural effusion(official read awaited)  -EKG: Afib , HR 65    Plan:  -will start on Lasix 40mg IV BID>>reassess qShift to adjust the dosing  -Strict I&O  -Daily weights  -continue low salt diet  -f/u Echocardiogram  -PT consult      #Microcytic anemia  -No h/o Melena, hematuria or PV bleed  -Hb 10.5(baseline around 12-13), MCV 80.2    Plan:  -f/u Anemia work up(iron studies, B12 and Folate)    #Chronic Afib, rate controlled  -EKG: Afib , HR 65    Plan:  -Continue with Eliquis 5mg BID and Metoprolol 25mg BID    #H/O HTN  #H/O Hypothyroidism  #H/O HLD  -will hold Amlodipine in the setting of worsening LE edema  -will start on Losartan while inpatient  -continue with Levothyroxine, Statin    #Constipation  -Bowel regimen    #?LE OA  -On percocet q6hr prn     #MISC:  -DVT ppx: Eliquis  -GI ppx: PPI  -Diet: DASH  -Activity: IAT  -Dispo: Admit to tele, Continue diuresis   A 79Y/F with Pmhx of Obesity, Cataract, Hypothyroid, Vertigo, Insomnia, GERD, HTN, Osteoarthritis, H/O arm surgery, A-fib on Eliquis presented to the ED for the evaluation of shortness of breath and bilateral LE edema. The patient has had LE edema for 2 years. She was having SOB for 2 months on and off,  mostly exertional, worsening for last 3-4 days. 3 month ago, the patient visited her PCP and was diagnosed with Afib, started on Eliquis and Metoprolol. She was planned to get CCTA today AM. For this, she was prescribed Metoprolol tartrate 100mg last night. After the dose, her SOB got worse and therefore she presented to ED today.    #Dyspnea likely 2/2 Acute exacerbation of CHF   -[id number](was 998 in feb, 2024), VBG: pCO2 49  -CXR: B/L Congestion, Left pleural effusion(official read awaited)  -EKG: Afib , HR 65    Plan:  -will start on Lasix 40mg IV BID>>reassess qShift to adjust the dosing  -Strict I&O  -Daily weights  -continue low salt diet  -f/u Echocardiogram  -PT consult      #Microcytic anemia  -No h/o Melena, hematuria or PV bleed  -Hb 10.5(baseline around 12-13), MCV 80.2    Plan:  -f/u Anemia work up(iron studies, B12 and Folate)    #Chronic Afib, rate controlled  -EKG: Afib , HR 65    Plan:  -Continue with Eliquis 5mg BID and Metoprolol 25mg BID    #H/O HTN  #H/O Hypothyroidism  #H/O HLD  -will hold Amlodipine in the setting of worsening LE edema  -will start on Losartan while inpatient  -continue with Levothyroxine, Statin    #Constipation  -Bowel regimen    #?LE OA  -On percocet q6hr prn     #MISC:  -DVT ppx: Eliquis  -GI ppx: PPI  -Diet: DASH  -Activity: IAT  -Dispo: Admit to tele, Continue diuresis

## 2024-05-30 NOTE — H&P ADULT - NSHPLABSRESULTS_GEN_ALL_CORE
LABS:                         10.5   9.02  )-----------( 301      ( 30 May 2024 09:40 )             35.3     05-30    139  |  105  |  16  ----------------------------<  104<H>  3.8   |  25  |  0.7    Ca    9.1      30 May 2024 09:40    TPro  6.9  /  Alb  4.3  /  TBili  0.5  /  DBili  x   /  AST  14  /  ALT  7   /  AlkPhos  71  05-30      Urinalysis Basic - ( 30 May 2024 09:40 )    Color: x / Appearance: x / SG: x / pH: x  Gluc: 104 mg/dL / Ketone: x  / Bili: x / Urobili: x   Blood: x / Protein: x / Nitrite: x   Leuk Esterase: x / RBC: x / WBC x   Sq Epi: x / Non Sq Epi: x / Bacteria: x                RADIOLOGY, EKG & ADDITIONAL TESTS: Reviewed.

## 2024-05-30 NOTE — ED ADULT NURSE NOTE - CCCP TRG CHIEF CMPLNT
Palbociclib (Ibrance) script refilled per Dr. Arreola's verbal order. Treatment plan, medication list, and flow sheet updated.    shortness of breath

## 2024-05-30 NOTE — H&P ADULT - ATTENDING COMMENTS
79Y/F with Pmhx of Obesity, Cataract, Hypothyroid, Vertigo, Insomnia, GERD, HTN, Osteoarthritis, H/O arm surgery, A-fib on Eliquis presented to the ED for the evaluation of shortness of breath and bilateral LE edema. The patient has had LE edema for 2 years. She was having SOB for 2 months on and off,  mostly exertional, worsening for last 3-4 days. 3 month ago, the patient visited her PCP and was diagnosed with Afib, started on Eliquis and Metoprolol. She was planned to get CCTA today AM. For this, she was prescribed Metoprolol tartrate 100mg last night. After the dose, her SOB got worse and therefore she presented to ED today.      Agree  with assessment  except for changes below.   Vital Signs Last 24 Hrs  T(C): 36.6 (31 May 2024 01:08), Max: 36.8 (30 May 2024 08:31)  T(F): 97.8 (31 May 2024 01:08), Max: 98.2 (30 May 2024 08:31)  HR: 65 (31 May 2024 01:08) (65 - 94)  BP: 128/72 (31 May 2024 01:08) (116/66 - 147/87)  BP(mean): --  RR: 18 (31 May 2024 01:08) (18 - 19)  SpO2: 95% (31 May 2024 01:08) (94% - 99%)    Parameters below as of 31 May 2024 01:08  Patient On (Oxygen Delivery Method): room air    Chest X-Ray:  Low lung volume. Cardiomegaly, unchanged. No acute infiltrates.        IMPRESSION  Respiratory Distress Secondary to HF   [id number](was 998 in feb, 2024), VBG: pCO2 49  Microcytic Anemia   Hx Chronic  Afib   ECG Afib Rate  controlled   c/w furosemide 40mg IV BID, Eliquis 5mg BID and Metoprolol 25mg BID  Titrate supplemental O2 as tolerated   Admit to telemetry  Daily standing weights; strict I's & O's; 1.5L fluid restrict  monitor 'lytes and replete accordingly (K>4; Mg>2)   check lipid panel, A1c, and TSH  Obtain TTE  Cardiology consult    Constipation - c/w bowel Regimen       Chronic Disease   Hx  HTN   Hx Hypothyroidism   Hx HLD  Hx OA  Hold Amlodipine   c/w Losartan  c/w Levothyroxine, Statin   Percocet PRN 79Y/F with Pmhx of Obesity, Cataract, Hypothyroid, Vertigo, Insomnia, GERD, HTN, Osteoarthritis, H/O arm surgery, A-fib on Eliquis presented to the ED for the evaluation of shortness of breath and bilateral LE edema. The patient has had LE edema for 2 years. She was having SOB for 2 months on and off,  mostly exertional, worsening for last 3-4 days. 3 month ago, the patient visited her PCP and was diagnosed with Afib, started on Eliquis and Metoprolol. She was planned to get CCTA today AM. For this, she was prescribed Metoprolol tartrate 100mg last night. After the dose, her SOB got worse and therefore she presented to ED today.      Agree  with assessment  except for changes below.   Vital Signs Last 24 Hrs  T(C): 36.6 (31 May 2024 01:08), Max: 36.8 (30 May 2024 08:31)  T(F): 97.8 (31 May 2024 01:08), Max: 98.2 (30 May 2024 08:31)  HR: 65 (31 May 2024 01:08) (65 - 94)  BP: 128/72 (31 May 2024 01:08) (116/66 - 147/87)  BP(mean): --  RR: 18 (31 May 2024 01:08) (18 - 19)  SpO2: 95% (31 May 2024 01:08) (94% - 99%)    Parameters below as of 31 May 2024 01:08  Patient On (Oxygen Delivery Method): room air    Chest X-Ray:  Low lung volume. Cardiomegaly, unchanged. No acute infiltrates.      PHYSICAL EXAM  GENERAL: NAD,  HEAD:  NCAT, EOMI, MM  NECK: Supple, Nontender  NERVOUS SYSTEM:  AAOx3, NFD  CHEST/LUNG: +bs b/l, No wheezing   HEART: +s1s2 RRR  ABDOMEN: soft, NT/ND  EXTREMITIES:  pp, 1+ edema  SKIN: age related skin changes     IMPRESSION  Respiratory Distress Secondary to HF   [id number](was 998 in feb, 2024), VBG: pCO2 49  Microcytic Anemia   Hx Chronic  Afib   ECG Afib Rate  controlled   c/w furosemide 40mg IV BID, Eliquis 5mg BID and Metoprolol 25mg BID   supplemental O2 as needed   Admit to telemetry  Daily standing weights; strict I's & O's; 1.5L fluid restrict  monitor 'lytes and replete accordingly (K>4; Mg>2)   check lipid panel, A1c, and TSH  Obtain TTE  Cardiology consult    Constipation - c/w bowel Regimen       Chronic Disease   Hx  HTN   Hx Hypothyroidism   Hx HLD  Hx OA  Hold Amlodipine   c/w Losartan  c/w Levothyroxine, Statin   Percocet PRN    seen on 05/30

## 2024-05-30 NOTE — H&P ADULT - HISTORY OF PRESENT ILLNESS
A 79Y/F with Pmhx of Obesity, Cataract, Hypothyroid, Vertigo, Insomnia, GERD, HTN, Osteoarthritis, H/O hand surgery, A-fib on Eliquis presented to the ED for the evaluation of shortness of breath, mostly exertional and worsened by lying flat for last few days, worsening since last night and a/w bilateral LE edema. Denied chest pain, fever, chills, N/V/D, abdominal pain, sick contacts, cough, recent travel, headache, dizziness or LOC.     #ED Vitals:  T(C): 36.8 (05-30-24 @ 08:31), Max: 36.8 (05-30-24 @ 08:31)  HR: 74 (05-30-24 @ 08:31) (74 - 74)  BP: 147/87 (05-30-24 @ 08:31) (147/87 - 147/87)  RR: 18 (05-30-24 @ 08:31) (18 - 18)  SpO2: 99% (05-30-24 @ 08:31) (99% - 99%)    #Labs: Hb 10.5(baseline around 12-13), MCV 80.2, [id number](was 998 in feb, 2024), VBG: pCO2 49    #Imaging:  CXR: B/L Congestion, Left pleural effusion(official read awaited)    EKG: Afib , HR 65    The patient is being admitted to medicine for the further management.      A 79Y/F with Pmhx of Obesity, Cataract, Hypothyroid, Vertigo, Insomnia, GERD, HTN, Osteoarthritis, H/O arm surgery, A-fib on Eliquis presented to the ED for the evaluation of shortness of breath and bilateral LE edema. The patient has had LE edema for 2 years. She was having SOB for 2 months on and off,  mostly exertional, worsening for last 3-4 days. 1 month ago, the patient visited her PCP and was diagnosed with Afib, started on Eliquis and Metoprolol. She was planned to get CCTA today AM. For this, she was prescribed Metoprolol tartrate 100mg last night. After the dose, her SOB got worse and therefore she presented to ED today. The patient  also endorses congested sensation in her throat. Denied chest pain, fever, chills, N/V/D, abdominal pain, sick contacts, cough, recent travel, headache, dizziness or LOC.     #ED Vitals:  T(C): 36.8 (05-30-24 @ 08:31), Max: 36.8 (05-30-24 @ 08:31)  HR: 74 (05-30-24 @ 08:31) (74 - 74)  BP: 147/87 (05-30-24 @ 08:31) (147/87 - 147/87)  RR: 18 (05-30-24 @ 08:31) (18 - 18)  SpO2: 99% (05-30-24 @ 08:31) (99% - 99%)    #Labs: Hb 10.5(baseline around 12-13), MCV 80.2, [id number](was 998 in feb, 2024), VBG: pCO2 49    #Imaging:  CXR: B/L Congestion, Left pleural effusion(official read awaited)    EKG: Afib , HR 65    The patient is being admitted to medicine for the further management.      A 79Y/F with Pmhx of Obesity, Cataract, Hypothyroid, Vertigo, Insomnia, GERD, HTN, Osteoarthritis, H/O arm surgery, A-fib on Eliquis presented to the ED for the evaluation of shortness of breath and bilateral LE edema. The patient has had LE edema for 2 years. She was having SOB for 2 months on and off,  mostly exertional, worsening for last 3-4 days. 1 month ago, the patient visited her PCP and was diagnosed with Afib, started on Eliquis and Metoprolol. She was planned to get CCTA today AM. For this, she was prescribed Metoprolol tartrate 100mg last night. After the dose, her SOB got worse and therefore she presented to ED today. The patient  also endorses congested sensation in her throat. The patient is chronically constipated. Denies any change in the diet. Denied chest pain, fever, chills, N/V/D, abdominal pain, sick contacts, cough, recent travel, headache, dizziness or LOC.     #ED Vitals:  T(C): 36.8 (05-30-24 @ 08:31), Max: 36.8 (05-30-24 @ 08:31)  HR: 74 (05-30-24 @ 08:31) (74 - 74)  BP: 147/87 (05-30-24 @ 08:31) (147/87 - 147/87)  RR: 18 (05-30-24 @ 08:31) (18 - 18)  SpO2: 99% (05-30-24 @ 08:31) (99% - 99%)    #Labs: Hb 10.5(baseline around 12-13), MCV 80.2, [id number](was 998 in feb, 2024), VBG: pCO2 49    #Imaging:  CXR: B/L Congestion, Left pleural effusion(official read awaited)    EKG: Afib , HR 65    The patient is being admitted to medicine for the further management.      A 79Y/F with Pmhx of Obesity, Cataract, Hypothyroid, Vertigo, Insomnia, GERD, HTN, Osteoarthritis, H/O arm surgery, A-fib on Eliquis presented to the ED for the evaluation of shortness of breath and bilateral LE edema. The patient has had LE edema for 2 years. She was having SOB for 2 months on and off,  mostly exertional, worsening for last 3-4 days. 3 month ago, the patient visited her PCP and was diagnosed with Afib, started on Eliquis and Metoprolol. She was planned to get CCTA today AM. For this, she was prescribed Metoprolol tartrate 100mg last night. After the dose, her SOB got worse and therefore she presented to ED today. The patient  also endorses congested sensation in her throat. The patient is chronically constipated. Denies any change in the diet. Denied chest pain, fever, chills, N/V/D, abdominal pain, sick contacts, cough, recent travel, headache, dizziness or LOC.   Amharic  ID: [id number]    #ED Vitals:  T(C): 36.8 (05-30-24 @ 08:31), Max: 36.8 (05-30-24 @ 08:31)  HR: 74 (05-30-24 @ 08:31) (74 - 74)  BP: 147/87 (05-30-24 @ 08:31) (147/87 - 147/87)  RR: 18 (05-30-24 @ 08:31) (18 - 18)  SpO2: 99% (05-30-24 @ 08:31) (99% - 99%)    #Labs: Hb 10.5(baseline around 12-13), MCV 80.2, [id number](was 998 in feb, 2024), VBG: pCO2 49    #Imaging:  CXR: B/L Congestion, Left pleural effusion(official read awaited)    EKG: Afib , HR 65    The patient is being admitted to medicine for the further management.

## 2024-05-30 NOTE — H&P ADULT - NSHPPHYSICALEXAM_GEN_ALL_CORE
CONSTITUTIONAL: Well groomed, no apparent distress  EYES: PERRLA and symmetric, EOMI, No conjunctival or scleral injection, non-icteric  ENMT: Oral mucosa with moist membranes.   RESP: No respiratory distress, no use of accessory muscles; CTA b/l, no WRR  CV: RRR, +S1S2, no MRG; no JVD; no peripheral edema  GI: Soft, NT, ND, no rebound, no guarding  LYMPH: No cervical LAD or tenderness  MSK: No digital clubbing or cyanosis  SKIN: No rashes or ulcers noted; no subcutaneous nodules or induration palpable  NEURO: CN II-XII intact; normal reflexes in upper and lower extremities, sensation intact in upper and lower extremities b/l to light touch   PSYCH: Appropriate insight/judgment; A+O x 3, mood and affect appropriate, recent/remote memory intact CONSTITUTIONAL: Well groomed, no apparent distress  EYES: PERRLA and symmetric, EOMI, No conjunctival or scleral injection, non-icteric  ENMT: Oral mucosa with moist membranes.   RESP: No respiratory distress, no use of accessory muscles; b/l occasional crackles present  CV: RRR, +S1S2, no MRG; no JVD; bilateral LE edema present, non-pitting  GI: Soft, NT, ND, no rebound, no guarding  LYMPH: No cervical LAD or tenderness  MSK: No digital clubbing or cyanosis  SKIN: No rashes or ulcers noted; no subcutaneous nodules or induration palpable  NEURO: CN II-XII intact; normal reflexes in upper and lower extremities, sensation intact in upper and lower extremities b/l to light touch   PSYCH: Appropriate insight/judgment; A+O x 3, mood and affect appropriate, recent/remote memory intact

## 2024-05-31 ENCOUNTER — TRANSCRIPTION ENCOUNTER (OUTPATIENT)
Age: 79
End: 2024-05-31

## 2024-05-31 ENCOUNTER — RESULT REVIEW (OUTPATIENT)
Age: 79
End: 2024-05-31

## 2024-05-31 LAB
ANION GAP SERPL CALC-SCNC: 15 MMOL/L — HIGH (ref 7–14)
BUN SERPL-MCNC: 13 MG/DL — SIGNIFICANT CHANGE UP (ref 10–20)
CALCIUM SERPL-MCNC: 9.3 MG/DL — SIGNIFICANT CHANGE UP (ref 8.4–10.5)
CHLORIDE SERPL-SCNC: 102 MMOL/L — SIGNIFICANT CHANGE UP (ref 98–110)
CO2 SERPL-SCNC: 23 MMOL/L — SIGNIFICANT CHANGE UP (ref 17–32)
CREAT SERPL-MCNC: 0.6 MG/DL — LOW (ref 0.7–1.5)
EGFR: 90 ML/MIN/1.73M2 — SIGNIFICANT CHANGE UP
FERRITIN SERPL-MCNC: 13 NG/ML — SIGNIFICANT CHANGE UP (ref 13–330)
FOLATE SERPL-MCNC: 9.9 NG/ML — SIGNIFICANT CHANGE UP
GLUCOSE SERPL-MCNC: 90 MG/DL — SIGNIFICANT CHANGE UP (ref 70–99)
HCT VFR BLD CALC: 35.8 % — LOW (ref 37–47)
HGB BLD-MCNC: 10.5 G/DL — LOW (ref 12–16)
IRON SATN MFR SERPL: 28 UG/DL — LOW (ref 35–150)
IRON SATN MFR SERPL: 7 % — LOW (ref 15–50)
MAGNESIUM SERPL-MCNC: 2.1 MG/DL — SIGNIFICANT CHANGE UP (ref 1.8–2.4)
MCHC RBC-ENTMCNC: 23.7 PG — LOW (ref 27–31)
MCHC RBC-ENTMCNC: 29.3 G/DL — LOW (ref 32–37)
MCV RBC AUTO: 80.8 FL — LOW (ref 81–99)
NRBC # BLD: 0 /100 WBCS — SIGNIFICANT CHANGE UP (ref 0–0)
PLATELET # BLD AUTO: 283 K/UL — SIGNIFICANT CHANGE UP (ref 130–400)
PMV BLD: 10.3 FL — SIGNIFICANT CHANGE UP (ref 7.4–10.4)
POTASSIUM SERPL-MCNC: 3.4 MMOL/L — LOW (ref 3.5–5)
POTASSIUM SERPL-SCNC: 3.4 MMOL/L — LOW (ref 3.5–5)
RBC # BLD: 4.43 M/UL — SIGNIFICANT CHANGE UP (ref 4.2–5.4)
RBC # FLD: 16.4 % — HIGH (ref 11.5–14.5)
SODIUM SERPL-SCNC: 140 MMOL/L — SIGNIFICANT CHANGE UP (ref 135–146)
TIBC SERPL-MCNC: 397 UG/DL — SIGNIFICANT CHANGE UP (ref 220–430)
UIBC SERPL-MCNC: 369 UG/DL — SIGNIFICANT CHANGE UP (ref 110–370)
VIT B12 SERPL-MCNC: 598 PG/ML — SIGNIFICANT CHANGE UP (ref 232–1245)
WBC # BLD: 8.34 K/UL — SIGNIFICANT CHANGE UP (ref 4.8–10.8)
WBC # FLD AUTO: 8.34 K/UL — SIGNIFICANT CHANGE UP (ref 4.8–10.8)

## 2024-05-31 PROCEDURE — 99232 SBSQ HOSP IP/OBS MODERATE 35: CPT

## 2024-05-31 PROCEDURE — 93970 EXTREMITY STUDY: CPT | Mod: 26

## 2024-05-31 PROCEDURE — 93306 TTE W/DOPPLER COMPLETE: CPT | Mod: 26

## 2024-05-31 PROCEDURE — 93010 ELECTROCARDIOGRAM REPORT: CPT

## 2024-05-31 RX ORDER — POTASSIUM CHLORIDE 20 MEQ
40 PACKET (EA) ORAL EVERY 4 HOURS
Refills: 0 | Status: COMPLETED | OUTPATIENT
Start: 2024-05-31 | End: 2024-05-31

## 2024-05-31 RX ADMIN — PANTOPRAZOLE SODIUM 40 MILLIGRAM(S): 20 TABLET, DELAYED RELEASE ORAL at 05:41

## 2024-05-31 RX ADMIN — Medication 25 MILLIGRAM(S): at 05:32

## 2024-05-31 RX ADMIN — POLYETHYLENE GLYCOL 3350 17 GRAM(S): 17 POWDER, FOR SOLUTION ORAL at 17:03

## 2024-05-31 RX ADMIN — Medication 40 MILLIEQUIVALENT(S): at 12:32

## 2024-05-31 RX ADMIN — APIXABAN 5 MILLIGRAM(S): 2.5 TABLET, FILM COATED ORAL at 17:02

## 2024-05-31 RX ADMIN — ATORVASTATIN CALCIUM 40 MILLIGRAM(S): 80 TABLET, FILM COATED ORAL at 22:11

## 2024-05-31 RX ADMIN — Medication 25 MILLIGRAM(S): at 17:02

## 2024-05-31 RX ADMIN — Medication 40 MILLIEQUIVALENT(S): at 16:04

## 2024-05-31 RX ADMIN — SENNA PLUS 2 TABLET(S): 8.6 TABLET ORAL at 22:11

## 2024-05-31 RX ADMIN — LOSARTAN POTASSIUM 50 MILLIGRAM(S): 100 TABLET, FILM COATED ORAL at 05:33

## 2024-05-31 RX ADMIN — APIXABAN 5 MILLIGRAM(S): 2.5 TABLET, FILM COATED ORAL at 05:32

## 2024-05-31 RX ADMIN — POLYETHYLENE GLYCOL 3350 17 GRAM(S): 17 POWDER, FOR SOLUTION ORAL at 05:35

## 2024-05-31 RX ADMIN — Medication 40 MILLIGRAM(S): at 05:32

## 2024-05-31 RX ADMIN — Medication 40 MILLIGRAM(S): at 13:15

## 2024-05-31 RX ADMIN — Medication 100 MICROGRAM(S): at 05:32

## 2024-05-31 RX ADMIN — Medication 3 MILLIGRAM(S): at 22:11

## 2024-05-31 NOTE — PHYSICAL THERAPY INITIAL EVALUATION ADULT - GAIT TRAINING, PT EVAL
Increase amb to 50ft supervision with by d/c                               Stairs: 4 steps CGA with 1HR by d/c

## 2024-05-31 NOTE — DISCHARGE NOTE NURSING/CASE MANAGEMENT/SOCIAL WORK - NSDCPEFALRISK_GEN_ALL_CORE
For information on Fall & Injury Prevention, visit: [email or web address] OR  [email or web address] OR  https://www.cdc.gov/steadi/patient.html

## 2024-05-31 NOTE — PATIENT PROFILE ADULT - FALL HARM RISK - HARM RISK INTERVENTIONS
Assistance with ambulation/Assistance OOB with selected safe patient handling equipment/Communicate Risk of Fall with Harm to all staff/Reinforce activity limits and safety measures with patient and family/Review medications for side effects contributing to fall risk/Sit up slowly, dangle for a short time, stand at bedside before walking/Tailored Fall Risk Interventions/Toileting schedule using arm’s reach rule for commode and bathroom/Visual Cue: Yellow wristband and red socks/Bed in lowest position, wheels locked, appropriate side rails in place/Call bell, personal items and telephone in reach/Instruct patient to call for assistance before getting out of bed or chair/Non-slip footwear when patient is out of bed/[place] to call system/Physically safe environment - no spills, clutter or unnecessary equipment/Purposeful Proactive Rounding/Room/bathroom lighting operational, light cord in reach

## 2024-05-31 NOTE — CHART NOTE - NSCHARTNOTEFT_GEN_A_CORE
was called by radiology. ccta will not be able to be done today. they will plan for monday. will make patient npo sunday night for ccta.

## 2024-05-31 NOTE — PROGRESS NOTE ADULT - SUBJECTIVE AND OBJECTIVE BOX
SUBJECTIVE:    Patient is a 79y old Female who presents with a chief complaint of CHF (31 May 2024 11:10)    Currently admitted to medicine with the primary diagnosis of:    Today is hospital day 1d.     Overnight Events:     admitted to  overnight     PAST MEDICAL & SURGICAL HISTORY  Osteoarthritis  feet    HTN (hypertension)    GERD (gastroesophageal reflux disease)    H/O insomnia    Vertigo    Hypothyroid    Cataract    Obese    History of repair of hip fracture  AURE    H/O hand surgery  WRIST   SCREW AND AURE LEFT        ALLERGIES:  No Known Allergies    MEDICATIONS:  STANDING MEDICATIONS  apixaban 5 milliGRAM(s) Oral two times a day  atorvastatin 40 milliGRAM(s) Oral at bedtime  furosemide   Injectable 40 milliGRAM(s) IV Push two times a day  levothyroxine 100 MICROGram(s) Oral daily  losartan 50 milliGRAM(s) Oral daily  metoprolol tartrate 25 milliGRAM(s) Oral two times a day  pantoprazole    Tablet 40 milliGRAM(s) Oral before breakfast  polyethylene glycol 3350 17 Gram(s) Oral two times a day  senna 2 Tablet(s) Oral at bedtime    PRN MEDICATIONS  aluminum hydroxide/magnesium hydroxide/simethicone Suspension 30 milliLiter(s) Oral every 4 hours PRN  melatonin 3 milliGRAM(s) Oral at bedtime PRN  ondansetron Injectable 4 milliGRAM(s) IV Push every 8 hours PRN  oxycodone    5 mG/acetaminophen 325 mG 1 Tablet(s) Oral every 6 hours PRN    VITALS:   ICU Vital Signs Last 24 Hrs  T(C): 37.1 (31 May 2024 04:53), Max: 37.1 (31 May 2024 04:53)  T(F): 98.7 (31 May 2024 04:53), Max: 98.7 (31 May 2024 04:53)  HR: 80 (31 May 2024 08:19) (65 - 94)  BP: 131/84 (31 May 2024 04:53) (116/66 - 131/84)  BP(mean): --  ABP: --  ABP(mean): --  RR: 18 (31 May 2024 08:19) (18 - 19)  SpO2: 95% (31 May 2024 08:19) (94% - 95%)    O2 Parameters below as of 31 May 2024 08:19  Patient On (Oxygen Delivery Method): room air            LABS:                        10.5   8.34  )-----------( 283      ( 31 May 2024 06:33 )             35.8     05-31    140  |  102  |  13  ----------------------------<  90  3.4<L>   |  23  |  0.6<L>    Ca    9.3      31 May 2024 06:33  Mg     2.1     05-31    TPro  6.9  /  Alb  4.3  /  TBili  0.5  /  DBili  x   /  AST  14  /  ALT  7   /  AlkPhos  71  05-30      Urinalysis Basic - ( 31 May 2024 06:33 )    Color: x / Appearance: x / SG: x / pH: x  Gluc: 90 mg/dL / Ketone: x  / Bili: x / Urobili: x   Blood: x / Protein: x / Nitrite: x   Leuk Esterase: x / RBC: x / WBC x   Sq Epi: x / Non Sq Epi: x / Bacteria: x                  RADIOLOGY:    PHYSICAL EXAM:  GEN:   LUNGS:   HEART:   ABD:   EXT:   NEURO:   SUBJECTIVE:    Patient is a 79y old Female who presents with a chief complaint of CHF (31 May 2024 11:10)    Currently admitted to medicine with the primary diagnosis of:    Today is hospital day 1d.     Overnight Events:     admitted to  overnight     PAST MEDICAL & SURGICAL HISTORY  Osteoarthritis  feet    HTN (hypertension)    GERD (gastroesophageal reflux disease)    H/O insomnia    Vertigo    Hypothyroid    Cataract    Obese    History of repair of hip fracture  AURE    H/O hand surgery  WRIST   SCREW AND AURE LEFT        ALLERGIES:  No Known Allergies    MEDICATIONS:  STANDING MEDICATIONS  apixaban 5 milliGRAM(s) Oral two times a day  atorvastatin 40 milliGRAM(s) Oral at bedtime  furosemide   Injectable 40 milliGRAM(s) IV Push two times a day  levothyroxine 100 MICROGram(s) Oral daily  losartan 50 milliGRAM(s) Oral daily  metoprolol tartrate 25 milliGRAM(s) Oral two times a day  pantoprazole    Tablet 40 milliGRAM(s) Oral before breakfast  polyethylene glycol 3350 17 Gram(s) Oral two times a day  senna 2 Tablet(s) Oral at bedtime    PRN MEDICATIONS  aluminum hydroxide/magnesium hydroxide/simethicone Suspension 30 milliLiter(s) Oral every 4 hours PRN  melatonin 3 milliGRAM(s) Oral at bedtime PRN  ondansetron Injectable 4 milliGRAM(s) IV Push every 8 hours PRN  oxycodone    5 mG/acetaminophen 325 mG 1 Tablet(s) Oral every 6 hours PRN    VITALS:   ICU Vital Signs Last 24 Hrs  T(C): 37.1 (31 May 2024 04:53), Max: 37.1 (31 May 2024 04:53)  T(F): 98.7 (31 May 2024 04:53), Max: 98.7 (31 May 2024 04:53)  HR: 80 (31 May 2024 08:19) (65 - 94)  BP: 131/84 (31 May 2024 04:53) (116/66 - 131/84)  BP(mean): --  ABP: --  ABP(mean): --  RR: 18 (31 May 2024 08:19) (18 - 19)  SpO2: 95% (31 May 2024 08:19) (94% - 95%)    O2 Parameters below as of 31 May 2024 08:19  Patient On (Oxygen Delivery Method): room air            LABS:                        10.5   8.34  )-----------( 283      ( 31 May 2024 06:33 )             35.8     05-31    140  |  102  |  13  ----------------------------<  90  3.4<L>   |  23  |  0.6<L>    Ca    9.3      31 May 2024 06:33  Mg     2.1     05-31    TPro  6.9  /  Alb  4.3  /  TBili  0.5  /  DBili  x   /  AST  14  /  ALT  7   /  AlkPhos  71  05-30      Urinalysis Basic - ( 31 May 2024 06:33 )    Color: x / Appearance: x / SG: x / pH: x  Gluc: 90 mg/dL / Ketone: x  / Bili: x / Urobili: x   Blood: x / Protein: x / Nitrite: x   Leuk Esterase: x / RBC: x / WBC x   Sq Epi: x / Non Sq Epi: x / Bacteria: x                  RADIOLOGY:  < from: Xray Chest 1 View-PORTABLE IMMEDIATE (05.30.24 @ 09:50) >  Low lung volume.    Cardiomegaly, unchanged.    No acute infiltrates.    < end of copied text >    PHYSICAL EXAM:  GEN: laying in bed  head: atraumatic    LUNGS: satting well on RA  HEART: s1 s2  ABD: nontender   EXT: swollen legs

## 2024-05-31 NOTE — PHYSICAL THERAPY INITIAL EVALUATION ADULT - GENERAL OBSERVATIONS, REHAB EVAL
Pt seen from [id number]. Pt encountered in the bed Albanian speaking  utilized during the session, +tele, Prima fit, agreeable for b/s PT.

## 2024-05-31 NOTE — PROGRESS NOTE ADULT - ASSESSMENT
79Y/F with Pmhx of Obesity, Cataract, Hypothyroid, Vertigo, Insomnia, GERD, HTN, Osteoarthritis, H/O arm surgery, A-fib on Eliquis presented to the ED for the evaluation of shortness of breath and bilateral LE edema. The patient has had LE edema for 2 years. She was having SOB for 2 months on and off,  mostly exertional, worsening for last 3-4 days. 3 month ago, the patient visited her PCP and was diagnosed with Afib, started on Eliquis and Metoprolol. She was planned to get CCTA today AM. For this, she was prescribed Metoprolol tartrate 100mg last night. After the dose, her SOB got worse and therefore she presented to ED today.    #Dyspnea likely 2/2 Acute exacerbation of CHF   - [id number](was 998 in feb, 2024), VBG: pCO2 49  - CXR: Low lung volume.Cardiomegaly, unchanged.No acute infiltrates  - EKG: Afib , HR 65  - f/u Echocardiogram  - was planned to get CCTA 5/31/2024 -> will get it on this admission  - c/w Lasix 40mg IV BID, losartan 50 milliGRAM(s) Oral daily, metoprolol tartrate 25 milliGRAM(s) Oral two times a day, atorvastatin 40 milliGRAM(s) Oral at bedtime  - Strict I&O  - Daily weights  - continue low salt diet  - PT consult for dispo  - Consider Cardiology pending CCTA result; may need inpatient stress test    #Microcytic anemia  - No h/o Melena, hematuria or PV bleed  - Hb 10.5(baseline around 12-13), MCV 80.2  - f/u Anemia work up(iron studies, B12 and Folate)    #Chronic Afib, rate controlled  -EKG: Afib , HR 65  -Continue with Eliquis 5mg BID and Metoprolol 25mg BID    #H/O HTN  #H/O Hypothyroidism  #H/O HLD  - Will send for TSH with reflex FT4  - cont holding Amlodipine in the setting of worsening LE edema -> need to switch upon discharge given CHF  - Started on Losartan while inpatient  -continue with Levothyroxine, Statin    #Constipation  -Bowel regimen    #?LE OA  -On percocet q6hr prn     DVT Ppx: apixaban 5 milliGRAM(s) Oral two times a day  GI Ppx: pantoprazole    Tablet 40 milliGRAM(s) Oral before breakfast  Diet:  Diet, DASH/TLC:    79Y/F with Pmhx of Obesity, Cataract, Hypothyroid, Vertigo, Insomnia, GERD, HTN, Osteoarthritis, H/O arm surgery, A-fib on Eliquis presented to the ED for the evaluation of shortness of breath and bilateral LE edema. The patient has had LE edema for 2 years. She was having SOB for 2 months on and off,  mostly exertional, worsening for last 3-4 days. 3 month ago, the patient visited her PCP and was diagnosed with Afib, started on Eliquis and Metoprolol. She was planned to get CCTA today AM. For this, she was prescribed Metoprolol tartrate 100mg last night. After the dose, her SOB got worse and therefore she presented to ED today.    #Dyspnea likely 2/2 Acute exacerbation of CHF   - [id number](was 998 in feb, 2024), VBG: pCO2 49  - CXR: Low lung volume.Cardiomegaly, unchanged.No acute infiltrates  - EKG: Afib , HR 65  - f/u Echocardiogram  - was planned to get CCTA 5/31/2024 -> will get it on this admission  - c/w Lasix 40mg IV BID, losartan 50 milliGRAM(s) Oral daily, metoprolol tartrate 25 milliGRAM(s) Oral two times a day, atorvastatin 40 milliGRAM(s) Oral at bedtime  - Strict I&O  - Daily weights  - continue low salt diet  - PT consult for dispo  - Consider Cardiology pending CCTA result; may need inpatient stress test    #swollen lower extremity   - f/u duplex     #Microcytic anemia  - No h/o Melena, hematuria or PV bleed  - Hb 10.5(baseline around 12-13), MCV 80.2  - f/u Anemia work up(iron studies, B12 and Folate)    #Chronic Afib, rate controlled  -EKG: Afib , HR 65  -Continue with Eliquis 5mg BID and Metoprolol 25mg BID    #H/O HTN  #H/O Hypothyroidism  #H/O HLD  - Will send for TSH with reflex FT4  - cont holding Amlodipine in the setting of worsening LE edema -> need to switch upon discharge given CHF  - Started on Losartan while inpatient  -continue with Levothyroxine, Statin    #Constipation  -Bowel regimen    #?LE OA  -On percocet q6hr prn     DVT Ppx: apixaban 5 milliGRAM(s) Oral two times a day  GI Ppx: pantoprazole    Tablet 40 milliGRAM(s) Oral before breakfast  Diet:  Diet, DASH/TLC:

## 2024-05-31 NOTE — PROGRESS NOTE ADULT - SUBJECTIVE AND OBJECTIVE BOX
MEDICINE ATTENDING PROGRESS NOTE  79Y/F with Pmhx of Obesity, Cataract, Hypothyroid, Vertigo, Insomnia, GERD, HTN, Osteoarthritis, H/O arm surgery, A-fib on Eliquis presented to the ED for the evaluation of shortness of breath and bilateral LE edema. The patient has had LE edema for 2 years. She was having SOB for 2 months on and off,  mostly exertional, worsening for last 3-4 days. 3 month ago, the patient visited her PCP and was diagnosed with Afib, started on Eliquis and Metoprolol. She was planned to get CCTA today AM. For this, she was prescribed Metoprolol tartrate 100mg last night. After the dose, her SOB got worse and therefore she presented to ED today.    Interval/Overnight Events  - No overnight issue  - On IV lasix -> will continue  - Patient was planned to get CCTA today -> will get it done while inpatient.  - Patient may need stress test vs cardiac cath pending CCTA result    ROS  General: Denies fevers, chills, nightsweats, weight loss  HEENT: Denies headache, rhinorrhea, sore throat, eye pain  CV: Denies CP, palpitations  PULM: Denies SOB, cough  GI: Denies abdominal pain, diarrhea  : Denies dysuria, hematuria  MSK: Denies arthralgias  SKIN: Denies rash   NEURO: Denies paresthesias, weakness  PSYCH: Denies depression    MEDICATIONS  (STANDING):  apixaban 5 milliGRAM(s) Oral two times a day  atorvastatin 40 milliGRAM(s) Oral at bedtime  furosemide   Injectable 40 milliGRAM(s) IV Push two times a day  levothyroxine 100 MICROGram(s) Oral daily  losartan 50 milliGRAM(s) Oral daily  metoprolol tartrate 25 milliGRAM(s) Oral two times a day  pantoprazole    Tablet 40 milliGRAM(s) Oral before breakfast  polyethylene glycol 3350 17 Gram(s) Oral two times a day  senna 2 Tablet(s) Oral at bedtime    MEDICATIONS  (PRN):  aluminum hydroxide/magnesium hydroxide/simethicone Suspension 30 milliLiter(s) Oral every 4 hours PRN Dyspepsia  melatonin 3 milliGRAM(s) Oral at bedtime PRN Insomnia  ondansetron Injectable 4 milliGRAM(s) IV Push every 8 hours PRN Nausea and/or Vomiting  oxycodone    5 mG/acetaminophen 325 mG 1 Tablet(s) Oral every 6 hours PRN for severe pain    ANTIBIOTICS:      VITALS:  T(F): 98.7, Max: 98.7 (05-31-24 @ 04:53)  HR: 80  BP: 131/84  RR: 18Vital Signs Last 24 Hrs  T(C): 37.1 (31 May 2024 04:53), Max: 37.1 (31 May 2024 04:53)  T(F): 98.7 (31 May 2024 04:53), Max: 98.7 (31 May 2024 04:53)  HR: 80 (31 May 2024 08:19) (65 - 94)  BP: 131/84 (31 May 2024 04:53) (116/66 - 131/84)  BP(mean): --  RR: 18 (31 May 2024 08:19) (18 - 19)  SpO2: 95% (31 May 2024 08:19) (94% - 95%)    Parameters below as of 31 May 2024 08:19  Patient On (Oxygen Delivery Method): room air     I&O's Summary    30 May 2024 07:01  -  31 May 2024 07:00  --------------------------------------------------------  IN: 0 mL / OUT: 250 mL / NET: -250 mL      PHYSICAL EXAM:  Gen: NAD, resting in bed  HEENT: Normocephalic, atraumatic  Neck: supple, no lymphadenopathy  CV: Regular rate & regular rhythm  Lungs: CTABL no wheeze  Abdomen: Soft, NTND+ BS present  Ext: Warm, well perfused no CCE  Neuro: non focal, awake, CN II-XII intact   Skin: no rash, no erythema  Psych: no SI, HI, Hallucination     LABS:                        10.5   8.34  )-----------( 283      ( 31 May 2024 06:33 )             35.8     05-31    140  |  102  |  13  ----------------------------<  90  3.4<L>   |  23  |  0.6<L>    Ca    9.3      31 May 2024 06:33  Mg     2.1     05-31    TPro  6.9  /  Alb  4.3  /  TBili  0.5  /  DBili  x   /  AST  14  /  ALT  7   /  AlkPhos  71  05-30    LIVER FUNCTIONS - ( 30 May 2024 09:40 )  Alb: 4.3 g/dL / Pro: 6.9 g/dL / ALK PHOS: 71 U/L / ALT: 7 U/L / AST: 14 U/L / GGT: x           MICROBIOLOGY:  Urinalysis Basic - ( 31 May 2024 06:33 )  Color: x / Appearance: x / SG: x / pH: x  Gluc: 90 mg/dL / Ketone: x  / Bili: x / Urobili: x   Blood: x / Protein: x / Nitrite: x   Leuk Esterase: x / RBC: x / WBC x   Sq Epi: x / Non Sq Epi: x / Bacteria: x    Blood Gas Venous - Lactate: 1.0 mmol/L (05-30-24 @ 09:41)    IMAGING:  CXR      CT    CARDIOLOGY TESTING  12 Lead ECG:   Ventricular Rate 65 BPM  QRS Duration 84 ms  Q-T Interval 412 ms  QTC Calculation(Bazett) 428 ms  R Axis 112 degrees  T Axis -53 degrees    Diagnosis Line Atrial fibrillation  Right axis deviation  Low voltage QRS  Cannot rule out Anterior infarct , age undetermined  Abnormal ECG    Confirmed by Juan R Markham ([id number]) on 5/30/2024 12:15:22 PM (05-30-24 @ 08:33)    ASSESSMENT/PLAN:  79Y/F with Pmhx of Obesity, Cataract, Hypothyroid, Vertigo, Insomnia, GERD, HTN, Osteoarthritis, H/O arm surgery, A-fib on Eliquis presented to the ED for the evaluation of shortness of breath and bilateral LE edema. The patient has had LE edema for 2 years. She was having SOB for 2 months on and off,  mostly exertional, worsening for last 3-4 days. 3 month ago, the patient visited her PCP and was diagnosed with Afib, started on Eliquis and Metoprolol. She was planned to get CCTA today AM. For this, she was prescribed Metoprolol tartrate 100mg last night. After the dose, her SOB got worse and therefore she presented to ED today.    #Dyspnea likely 2/2 Acute exacerbation of CHF   - [id number](was 998 in feb, 2024), VBG: pCO2 49  - CXR: B/L Congestion, Left pleural effusion (official read awaited)  - EKG: Afib , HR 65  - f/u Echocardiogram  - was planned to get CCTA 5/31/2024 -> will get it on this admission  - c/w Lasix 40mg IV BID, losartan 50 milliGRAM(s) Oral daily, metoprolol tartrate 25 milliGRAM(s) Oral two times a day, atorvastatin 40 milliGRAM(s) Oral at bedtime  - Strict I&O  - Daily weights  - continue low salt diet  - PT consult for dispo  - Consider Cardiology pending CCTA result; may need inpatient stress test    #Microcytic anemia  - No h/o Melena, hematuria or PV bleed  - Hb 10.5(baseline around 12-13), MCV 80.2  - f/u Anemia work up(iron studies, B12 and Folate)    #Chronic Afib, rate controlled  -EKG: Afib , HR 65  -Continue with Eliquis 5mg BID and Metoprolol 25mg BID    #H/O HTN  #H/O Hypothyroidism  #H/O HLD  - Will send for TSH with reflex FT4  - cont holding Amlodipine in the setting of worsening LE edema -> need to switch upon discharge given CHF  - Started on Losartan while inpatient  -continue with Levothyroxine, Statin    #Constipation  -Bowel regimen    #?LE OA  -On percocet q6hr prn     #Supportive Management:  Dispo:  Home vs SNF  DVT Ppx: apixaban 5 milliGRAM(s) Oral two times a day  GI Ppx: pantoprazole    Tablet 40 milliGRAM(s) Oral before breakfast  Diet:  Diet, DASH/TLC:   Sodium & Cholesterol Restricted (05-30-24 @ 16:34) [Active]    Total time spent to complete patient's bedside assessment, review medical chart, discuss medical plan of care with covering medical team was more than 45 minutes with >50% of time spent face to face with patient, discussion with patient/family and/or coordination of care    Housestaff's notes reviewed. When there is confusion regarding the content or information provided in the notes of a housestaff (resident, medical student, physician assistant, or nurse practioner) and the attending physician notes, the attending physician's note takes precedence and supersedes the other notes.    Juan Antonio Crum MD/Domingo  Attending Physician MEDICINE ATTENDING PROGRESS NOTE  79Y/F with Pmhx of Obesity, Cataract, Hypothyroid, Vertigo, Insomnia, GERD, HTN, Osteoarthritis, H/O arm surgery, A-fib on Eliquis presented to the ED for the evaluation of shortness of breath and bilateral LE edema. The patient has had LE edema for 2 years. She was having SOB for 2 months on and off,  mostly exertional, worsening for last 3-4 days. 3 month ago, the patient visited her PCP and was diagnosed with Afib, started on Eliquis and Metoprolol. She was planned to get CCTA today AM. For this, she was prescribed Metoprolol tartrate 100mg last night. After the dose, her SOB got worse and therefore she presented to ED today.    Interval/Overnight Events  - No overnight issue  - On IV lasix -> will continue  - supplement potassium  - Patient was planned to get CCTA today -> will get it done while inpatient.  - Patient may need stress test vs cardiac cath pending CCTA result    ROS  General: Denies fevers, chills, nightsweats, weight loss  HEENT: Denies headache, rhinorrhea, sore throat, eye pain  CV: Denies CP, palpitations  PULM: Denies SOB, cough  GI: Denies abdominal pain, diarrhea  : Denies dysuria, hematuria  MSK: Denies arthralgias  SKIN: Denies rash   NEURO: Denies paresthesias, weakness  PSYCH: Denies depression    MEDICATIONS  (STANDING):  apixaban 5 milliGRAM(s) Oral two times a day  atorvastatin 40 milliGRAM(s) Oral at bedtime  furosemide   Injectable 40 milliGRAM(s) IV Push two times a day  levothyroxine 100 MICROGram(s) Oral daily  losartan 50 milliGRAM(s) Oral daily  metoprolol tartrate 25 milliGRAM(s) Oral two times a day  pantoprazole    Tablet 40 milliGRAM(s) Oral before breakfast  polyethylene glycol 3350 17 Gram(s) Oral two times a day  senna 2 Tablet(s) Oral at bedtime    MEDICATIONS  (PRN):  aluminum hydroxide/magnesium hydroxide/simethicone Suspension 30 milliLiter(s) Oral every 4 hours PRN Dyspepsia  melatonin 3 milliGRAM(s) Oral at bedtime PRN Insomnia  ondansetron Injectable 4 milliGRAM(s) IV Push every 8 hours PRN Nausea and/or Vomiting  oxycodone    5 mG/acetaminophen 325 mG 1 Tablet(s) Oral every 6 hours PRN for severe pain    ANTIBIOTICS:      VITALS:  T(F): 98.7, Max: 98.7 (05-31-24 @ 04:53)  HR: 80  BP: 131/84  RR: 18Vital Signs Last 24 Hrs  T(C): 37.1 (31 May 2024 04:53), Max: 37.1 (31 May 2024 04:53)  T(F): 98.7 (31 May 2024 04:53), Max: 98.7 (31 May 2024 04:53)  HR: 80 (31 May 2024 08:19) (65 - 94)  BP: 131/84 (31 May 2024 04:53) (116/66 - 131/84)  BP(mean): --  RR: 18 (31 May 2024 08:19) (18 - 19)  SpO2: 95% (31 May 2024 08:19) (94% - 95%)    Parameters below as of 31 May 2024 08:19  Patient On (Oxygen Delivery Method): room air     I&O's Summary    30 May 2024 07:01  -  31 May 2024 07:00  --------------------------------------------------------  IN: 0 mL / OUT: 250 mL / NET: -250 mL      PHYSICAL EXAM:  Gen: NAD, resting in bed  HEENT: Normocephalic, atraumatic  Neck: supple, no lymphadenopathy  CV: Regular rate & regular rhythm  Lungs: CTABL no wheeze  Abdomen: Soft, NTND+ BS present  Ext: Warm, well perfused no CCE  Neuro: non focal, awake, CN II-XII intact   Skin: no rash, no erythema  Psych: no SI, HI, Hallucination     LABS:                        10.5   8.34  )-----------( 283      ( 31 May 2024 06:33 )             35.8     05-31    140  |  102  |  13  ----------------------------<  90  3.4<L>   |  23  |  0.6<L>    Ca    9.3      31 May 2024 06:33  Mg     2.1     05-31    TPro  6.9  /  Alb  4.3  /  TBili  0.5  /  DBili  x   /  AST  14  /  ALT  7   /  AlkPhos  71  05-30    LIVER FUNCTIONS - ( 30 May 2024 09:40 )  Alb: 4.3 g/dL / Pro: 6.9 g/dL / ALK PHOS: 71 U/L / ALT: 7 U/L / AST: 14 U/L / GGT: x           MICROBIOLOGY:  Urinalysis Basic - ( 31 May 2024 06:33 )  Color: x / Appearance: x / SG: x / pH: x  Gluc: 90 mg/dL / Ketone: x  / Bili: x / Urobili: x   Blood: x / Protein: x / Nitrite: x   Leuk Esterase: x / RBC: x / WBC x   Sq Epi: x / Non Sq Epi: x / Bacteria: x    Blood Gas Venous - Lactate: 1.0 mmol/L (05-30-24 @ 09:41)    IMAGING:  CXR      CT    CARDIOLOGY TESTING  12 Lead ECG:   Ventricular Rate 65 BPM  QRS Duration 84 ms  Q-T Interval 412 ms  QTC Calculation(Bazett) 428 ms  R Axis 112 degrees  T Axis -53 degrees    Diagnosis Line Atrial fibrillation  Right axis deviation  Low voltage QRS  Cannot rule out Anterior infarct , age undetermined  Abnormal ECG    Confirmed by Juan R Markham ([id number]) on 5/30/2024 12:15:22 PM (05-30-24 @ 08:33)    ASSESSMENT/PLAN:  79Y/F with Pmhx of Obesity, Cataract, Hypothyroid, Vertigo, Insomnia, GERD, HTN, Osteoarthritis, H/O arm surgery, A-fib on Eliquis presented to the ED for the evaluation of shortness of breath and bilateral LE edema. The patient has had LE edema for 2 years. She was having SOB for 2 months on and off,  mostly exertional, worsening for last 3-4 days. 3 month ago, the patient visited her PCP and was diagnosed with Afib, started on Eliquis and Metoprolol. She was planned to get CCTA today AM. For this, she was prescribed Metoprolol tartrate 100mg last night. After the dose, her SOB got worse and therefore she presented to ED today.    #Dyspnea likely 2/2 Acute exacerbation of CHF   - [id number](was 998 in feb, 2024), VBG: pCO2 49  - CXR: B/L Congestion, Left pleural effusion (official read awaited)  - EKG: Afib , HR 65  - f/u Echocardiogram  - was planned to get CCTA 5/31/2024 -> will get it on this admission  - c/w Lasix 40mg IV BID, losartan 50 milliGRAM(s) Oral daily, metoprolol tartrate 25 milliGRAM(s) Oral two times a day, atorvastatin 40 milliGRAM(s) Oral at bedtime  - Strict I&O  - Daily weights  - continue low salt diet  - PT consult for dispo  - Consider Cardiology pending CCTA result; may need inpatient stress test    #Microcytic anemia  - No h/o Melena, hematuria or PV bleed  - Hb 10.5(baseline around 12-13), MCV 80.2  - f/u Anemia work up(iron studies, B12 and Folate)    #Chronic Afib, rate controlled  -EKG: Afib , HR 65  -Continue with Eliquis 5mg BID and Metoprolol 25mg BID    #H/O HTN  #H/O Hypothyroidism  #H/O HLD  - Will send for TSH with reflex FT4  - cont holding Amlodipine in the setting of worsening LE edema -> need to switch upon discharge given CHF  - Started on Losartan while inpatient  -continue with Levothyroxine, Statin    #Constipation  -Bowel regimen    #?LE OA  -On percocet q6hr prn     #Supportive Management:  Dispo:  Home vs SNF  DVT Ppx: apixaban 5 milliGRAM(s) Oral two times a day  GI Ppx: pantoprazole    Tablet 40 milliGRAM(s) Oral before breakfast  Diet:  Diet, DASH/TLC:   Sodium & Cholesterol Restricted (05-30-24 @ 16:34) [Active]    Total time spent to complete patient's bedside assessment, review medical chart, discuss medical plan of care with covering medical team was more than 45 minutes with >50% of time spent face to face with patient, discussion with patient/family and/or coordination of care    Housestaff's notes reviewed. When there is confusion regarding the content or information provided in the notes of a housestaff (resident, medical student, physician assistant, or nurse practioner) and the attending physician notes, the attending physician's note takes precedence and supersedes the other notes.    Juan Antonio Crum MD/Domingo  Attending Physician

## 2024-05-31 NOTE — DISCHARGE NOTE NURSING/CASE MANAGEMENT/SOCIAL WORK - PATIENT PORTAL LINK FT
You can access the FollowMyHealth Patient Portal offered by [facility] by registering at the following website: [email or web address]. By joining [employer]’s FollowMyHealth portal, you will also be able to view your health information using other applications (apps) compatible with our system.

## 2024-05-31 NOTE — PHYSICAL THERAPY INITIAL EVALUATION ADULT - PERTINENT HX OF CURRENT PROBLEM, REHAB EVAL
79Y/F with Pmhx of Obesity, Cataract, Hypothyroid, Vertigo, Insomnia, GERD, HTN, Osteoarthritis, H/O arm surgery, A-fib on Eliquis presented to the ED for the evaluation of shortness of breath and bilateral LE edema. The patient has had LE edema for 2 years. She was having SOB for 2 months on and off,  mostly exertional, worsening for last 3-4 days. 3 month ago, the patient visited her PCP and was diagnosed with Afib, started on Eliquis and Metoprolol. She was planned to get CCTA today AM. For this, she was prescribed Metoprolol tartrate 100mg last night. After the dose, her SOB got worse and therefore she presented to ED today.  #Dyspnea likely 2/2 Acute exacerbation of CHF

## 2024-06-01 LAB
ALBUMIN SERPL ELPH-MCNC: 4.2 G/DL — SIGNIFICANT CHANGE UP (ref 3.5–5.2)
ALP SERPL-CCNC: 70 U/L — SIGNIFICANT CHANGE UP (ref 30–115)
ALT FLD-CCNC: 10 U/L — SIGNIFICANT CHANGE UP (ref 0–41)
ANION GAP SERPL CALC-SCNC: 13 MMOL/L — SIGNIFICANT CHANGE UP (ref 7–14)
AST SERPL-CCNC: 17 U/L — SIGNIFICANT CHANGE UP (ref 0–41)
BASOPHILS # BLD AUTO: 0.06 K/UL — SIGNIFICANT CHANGE UP (ref 0–0.2)
BASOPHILS NFR BLD AUTO: 0.7 % — SIGNIFICANT CHANGE UP (ref 0–1)
BILIRUB SERPL-MCNC: 0.6 MG/DL — SIGNIFICANT CHANGE UP (ref 0.2–1.2)
BUN SERPL-MCNC: 14 MG/DL — SIGNIFICANT CHANGE UP (ref 10–20)
CALCIUM SERPL-MCNC: 9.2 MG/DL — SIGNIFICANT CHANGE UP (ref 8.4–10.5)
CHLORIDE SERPL-SCNC: 105 MMOL/L — SIGNIFICANT CHANGE UP (ref 98–110)
CO2 SERPL-SCNC: 27 MMOL/L — SIGNIFICANT CHANGE UP (ref 17–32)
CREAT SERPL-MCNC: 0.7 MG/DL — SIGNIFICANT CHANGE UP (ref 0.7–1.5)
EGFR: 87 ML/MIN/1.73M2 — SIGNIFICANT CHANGE UP
EOSINOPHIL # BLD AUTO: 0.06 K/UL — SIGNIFICANT CHANGE UP (ref 0–0.7)
EOSINOPHIL NFR BLD AUTO: 0.7 % — SIGNIFICANT CHANGE UP (ref 0–8)
GLUCOSE SERPL-MCNC: 101 MG/DL — HIGH (ref 70–99)
HCT VFR BLD CALC: 35.6 % — LOW (ref 37–47)
HGB BLD-MCNC: 10.6 G/DL — LOW (ref 12–16)
IMM GRANULOCYTES NFR BLD AUTO: 0.2 % — SIGNIFICANT CHANGE UP (ref 0.1–0.3)
LYMPHOCYTES # BLD AUTO: 1.91 K/UL — SIGNIFICANT CHANGE UP (ref 1.2–3.4)
LYMPHOCYTES # BLD AUTO: 23.6 % — SIGNIFICANT CHANGE UP (ref 20.5–51.1)
MAGNESIUM SERPL-MCNC: 2 MG/DL — SIGNIFICANT CHANGE UP (ref 1.8–2.4)
MCHC RBC-ENTMCNC: 23.8 PG — LOW (ref 27–31)
MCHC RBC-ENTMCNC: 29.8 G/DL — LOW (ref 32–37)
MCV RBC AUTO: 79.8 FL — LOW (ref 81–99)
MONOCYTES # BLD AUTO: 0.68 K/UL — HIGH (ref 0.1–0.6)
MONOCYTES NFR BLD AUTO: 8.4 % — SIGNIFICANT CHANGE UP (ref 1.7–9.3)
NEUTROPHILS # BLD AUTO: 5.36 K/UL — SIGNIFICANT CHANGE UP (ref 1.4–6.5)
NEUTROPHILS NFR BLD AUTO: 66.4 % — SIGNIFICANT CHANGE UP (ref 42.2–75.2)
NRBC # BLD: 0 /100 WBCS — SIGNIFICANT CHANGE UP (ref 0–0)
PLATELET # BLD AUTO: 328 K/UL — SIGNIFICANT CHANGE UP (ref 130–400)
PMV BLD: 10.7 FL — HIGH (ref 7.4–10.4)
POTASSIUM SERPL-MCNC: 3.8 MMOL/L — SIGNIFICANT CHANGE UP (ref 3.5–5)
POTASSIUM SERPL-SCNC: 3.8 MMOL/L — SIGNIFICANT CHANGE UP (ref 3.5–5)
PROT SERPL-MCNC: 6.9 G/DL — SIGNIFICANT CHANGE UP (ref 6–8)
RBC # BLD: 4.46 M/UL — SIGNIFICANT CHANGE UP (ref 4.2–5.4)
RBC # FLD: 16.4 % — HIGH (ref 11.5–14.5)
SODIUM SERPL-SCNC: 145 MMOL/L — SIGNIFICANT CHANGE UP (ref 135–146)
T4 FREE SERPL-MCNC: 1.5 NG/DL — SIGNIFICANT CHANGE UP (ref 0.9–1.8)
T4 FREE+ TSH PNL SERPL: 15.33 UIU/ML — HIGH (ref 0.27–4.2)
WBC # BLD: 8.09 K/UL — SIGNIFICANT CHANGE UP (ref 4.8–10.8)
WBC # FLD AUTO: 8.09 K/UL — SIGNIFICANT CHANGE UP (ref 4.8–10.8)

## 2024-06-01 PROCEDURE — 99232 SBSQ HOSP IP/OBS MODERATE 35: CPT

## 2024-06-01 RX ORDER — IRON SUCROSE 20 MG/ML
200 INJECTION, SOLUTION INTRAVENOUS EVERY 24 HOURS
Refills: 0 | Status: DISCONTINUED | OUTPATIENT
Start: 2024-06-01 | End: 2024-06-06

## 2024-06-01 RX ADMIN — Medication 25 MILLIGRAM(S): at 18:16

## 2024-06-01 RX ADMIN — POLYETHYLENE GLYCOL 3350 17 GRAM(S): 17 POWDER, FOR SOLUTION ORAL at 18:16

## 2024-06-01 RX ADMIN — ONDANSETRON 4 MILLIGRAM(S): 8 TABLET, FILM COATED ORAL at 09:41

## 2024-06-01 RX ADMIN — POLYETHYLENE GLYCOL 3350 17 GRAM(S): 17 POWDER, FOR SOLUTION ORAL at 05:45

## 2024-06-01 RX ADMIN — ATORVASTATIN CALCIUM 40 MILLIGRAM(S): 80 TABLET, FILM COATED ORAL at 21:22

## 2024-06-01 RX ADMIN — ONDANSETRON 4 MILLIGRAM(S): 8 TABLET, FILM COATED ORAL at 02:28

## 2024-06-01 RX ADMIN — LOSARTAN POTASSIUM 50 MILLIGRAM(S): 100 TABLET, FILM COATED ORAL at 05:45

## 2024-06-01 RX ADMIN — IRON SUCROSE 110 MILLIGRAM(S): 20 INJECTION, SOLUTION INTRAVENOUS at 15:00

## 2024-06-01 RX ADMIN — APIXABAN 5 MILLIGRAM(S): 2.5 TABLET, FILM COATED ORAL at 18:16

## 2024-06-01 RX ADMIN — Medication 100 MICROGRAM(S): at 05:44

## 2024-06-01 RX ADMIN — SENNA PLUS 2 TABLET(S): 8.6 TABLET ORAL at 21:22

## 2024-06-01 RX ADMIN — PANTOPRAZOLE SODIUM 40 MILLIGRAM(S): 20 TABLET, DELAYED RELEASE ORAL at 05:45

## 2024-06-01 RX ADMIN — Medication 25 MILLIGRAM(S): at 05:45

## 2024-06-01 RX ADMIN — Medication 40 MILLIGRAM(S): at 05:45

## 2024-06-01 RX ADMIN — APIXABAN 5 MILLIGRAM(S): 2.5 TABLET, FILM COATED ORAL at 05:44

## 2024-06-01 RX ADMIN — Medication 40 MILLIGRAM(S): at 14:59

## 2024-06-01 NOTE — PROGRESS NOTE ADULT - ASSESSMENT
79Y/F with Pmhx of Obesity, Cataract, Hypothyroid, Vertigo, Insomnia, GERD, HTN, Osteoarthritis, H/O arm surgery, A-fib on Eliquis presented to the ED for the evaluation of shortness of breath and bilateral LE edema. The patient has had LE edema for 2 years. She was having SOB for 2 months on and off,  mostly exertional, worsening for last 3-4 days. 3 month ago, the patient visited her PCP and was diagnosed with Afib, started on Eliquis and Metoprolol. She was planned to get CCTA today AM. For this, she was prescribed Metoprolol tartrate 100mg last night. After the dose, her SOB got worse and therefore she presented to ED today.    A/P:   Acute HFpEF:   Mild, SOB, mild leg edema, [id number](was 998 in feb, 2024).  CXR showed mild pulmonary congestion, cardiomegaly.   Echo  On Lasix 40mg IV BID, switch to Lasix po 40mg daily.   Pending echo, CCTA.   Low sodium diet, fluid restriction.     Chronic Atrial Fibrillation:   Rate is controlled  Continue Metoprolol 25mg BID and Eliquis.     Microcytic anemia  iron deficiency anemia:   No melena or acute bleeding  Start on Venofer IV, patient is on Eliquis, need further monitor outpatient to rule out GI bleeidng    HTN: Continue Losartan and Metoprolol, Amlodipine held for possible the cause of leg edema.   Hypothyroidism: continue with Levothyroxine, Statin    Constipation  -Bowel regimen    #?LE OA  -On percocet q6hr prn     DVT Prophylaxis: Eliquis    #Progress Note Handoff:  Pending (specify): CCTA  Family discussion:  Disposition: Home.

## 2024-06-01 NOTE — PROGRESS NOTE ADULT - SUBJECTIVE AND OBJECTIVE BOX
MIGUEL ANGELCLAUDIOAMARA  79y  Female      Patient is a 79y old  Female who presents with a chief complaint of CHF (31 May 2024 11:10)      INTERVAL HPI/OVERNIGHT EVENTS:  German speaking, her son helped with translation, she feels ok, SOB improved, leg edema improved.   Vital Signs Last 24 Hrs  T(C): 36.3 (01 Jun 2024 12:08), Max: 37.2 (31 May 2024 20:22)  T(F): 97.4 (01 Jun 2024 12:08), Max: 98.9 (31 May 2024 20:22)  HR: 87 (01 Jun 2024 12:08) (76 - 87)  BP: 138/71 (01 Jun 2024 12:08) (133/75 - 138/71)  BP(mean): 95 (31 May 2024 20:22) (95 - 95)  RR: 17 (01 Jun 2024 12:08) (17 - 18)  SpO2: --          05-31-24 @ 07:01  -  06-01-24 @ 07:00  --------------------------------------------------------  IN: 506 mL / OUT: 1500 mL / NET: -994 mL            Consultant(s) Notes Reviewed:  [x ] YES  [ ] NO          MEDICATIONS  (STANDING):  apixaban 5 milliGRAM(s) Oral two times a day  atorvastatin 40 milliGRAM(s) Oral at bedtime  furosemide   Injectable 40 milliGRAM(s) IV Push two times a day  iron sucrose IVPB 200 milliGRAM(s) IV Intermittent every 24 hours  levothyroxine 100 MICROGram(s) Oral daily  losartan 50 milliGRAM(s) Oral daily  metoprolol tartrate 25 milliGRAM(s) Oral two times a day  oxycodone    5 mG/acetaminophen 325 mG 1 Tablet(s) Oral every 6 hours  pantoprazole    Tablet 40 milliGRAM(s) Oral before breakfast  polyethylene glycol 3350 17 Gram(s) Oral two times a day  senna 2 Tablet(s) Oral at bedtime    MEDICATIONS  (PRN):  aluminum hydroxide/magnesium hydroxide/simethicone Suspension 30 milliLiter(s) Oral every 4 hours PRN Dyspepsia  melatonin 3 milliGRAM(s) Oral at bedtime PRN Insomnia  ondansetron Injectable 4 milliGRAM(s) IV Push every 8 hours PRN Nausea and/or Vomiting      LABS                          10.6   8.09  )-----------( 328      ( 01 Jun 2024 07:16 )             35.6     06-01    145  |  105  |  14  ----------------------------<  101<H>  3.8   |  27  |  0.7    Ca    9.2      01 Jun 2024 07:16  Mg     2.0     06-01    TPro  6.9  /  Alb  4.2  /  TBili  0.6  /  DBili  x   /  AST  17  /  ALT  10  /  AlkPhos  70  06-01      Urinalysis Basic - ( 01 Jun 2024 07:16 )    Color: x / Appearance: x / SG: x / pH: x  Gluc: 101 mg/dL / Ketone: x  / Bili: x / Urobili: x   Blood: x / Protein: x / Nitrite: x   Leuk Esterase: x / RBC: x / WBC x   Sq Epi: x / Non Sq Epi: x / Bacteria: x        Lactate Trend        CAPILLARY BLOOD GLUCOSE            RADIOLOGY & ADDITIONAL TESTS:    Imaging Personally Reviewed:  [ ] YES  [ ] NO    HEALTH ISSUES - PROBLEM Dx:          PHYSICAL EXAM:  GENERAL: NAD, well-developed.  HEAD:  Atraumatic, Normocephalic.  EYES: EOMI, PERRLA, conjunctiva and sclera clear.  NECK: Supple, No JVD.  CHEST/LUNG: Clear to auscultation bilaterally; No wheeze.  HEART: Irregular rate and rhythm; S1 S2.   ABDOMEN: Soft, Nontender, Nondistended; Bowel sounds present.  EXTREMITIES:  2+ Peripheral Pulses, No clubbing, cyanosis, improved leg edema  PSYCH: AAOx3.  NEUROLOGY: non-focal.  SKIN: No rashes or lesions.

## 2024-06-02 LAB
ALBUMIN SERPL ELPH-MCNC: 4.5 G/DL — SIGNIFICANT CHANGE UP (ref 3.5–5.2)
ALP SERPL-CCNC: 66 U/L — SIGNIFICANT CHANGE UP (ref 30–115)
ALT FLD-CCNC: 10 U/L — SIGNIFICANT CHANGE UP (ref 0–41)
ANION GAP SERPL CALC-SCNC: 12 MMOL/L — SIGNIFICANT CHANGE UP (ref 7–14)
AST SERPL-CCNC: 19 U/L — SIGNIFICANT CHANGE UP (ref 0–41)
BASOPHILS # BLD AUTO: 0.1 K/UL — SIGNIFICANT CHANGE UP (ref 0–0.2)
BASOPHILS NFR BLD AUTO: 1.2 % — HIGH (ref 0–1)
BILIRUB SERPL-MCNC: 0.6 MG/DL — SIGNIFICANT CHANGE UP (ref 0.2–1.2)
BUN SERPL-MCNC: 15 MG/DL — SIGNIFICANT CHANGE UP (ref 10–20)
CALCIUM SERPL-MCNC: 9.2 MG/DL — SIGNIFICANT CHANGE UP (ref 8.4–10.5)
CHLORIDE SERPL-SCNC: 102 MMOL/L — SIGNIFICANT CHANGE UP (ref 98–110)
CO2 SERPL-SCNC: 27 MMOL/L — SIGNIFICANT CHANGE UP (ref 17–32)
CREAT SERPL-MCNC: 0.8 MG/DL — SIGNIFICANT CHANGE UP (ref 0.7–1.5)
EGFR: 74 ML/MIN/1.73M2 — SIGNIFICANT CHANGE UP
EOSINOPHIL # BLD AUTO: 0.15 K/UL — SIGNIFICANT CHANGE UP (ref 0–0.7)
EOSINOPHIL NFR BLD AUTO: 1.8 % — SIGNIFICANT CHANGE UP (ref 0–8)
GLUCOSE SERPL-MCNC: 104 MG/DL — HIGH (ref 70–99)
HCT VFR BLD CALC: 38.2 % — SIGNIFICANT CHANGE UP (ref 37–47)
HGB BLD-MCNC: 11.5 G/DL — LOW (ref 12–16)
IMM GRANULOCYTES NFR BLD AUTO: 0.2 % — SIGNIFICANT CHANGE UP (ref 0.1–0.3)
LYMPHOCYTES # BLD AUTO: 1.88 K/UL — SIGNIFICANT CHANGE UP (ref 1.2–3.4)
LYMPHOCYTES # BLD AUTO: 23 % — SIGNIFICANT CHANGE UP (ref 20.5–51.1)
MAGNESIUM SERPL-MCNC: 2.4 MG/DL — SIGNIFICANT CHANGE UP (ref 1.8–2.4)
MCHC RBC-ENTMCNC: 24.1 PG — LOW (ref 27–31)
MCHC RBC-ENTMCNC: 30.1 G/DL — LOW (ref 32–37)
MCV RBC AUTO: 79.9 FL — LOW (ref 81–99)
MONOCYTES # BLD AUTO: 0.64 K/UL — HIGH (ref 0.1–0.6)
MONOCYTES NFR BLD AUTO: 7.8 % — SIGNIFICANT CHANGE UP (ref 1.7–9.3)
NEUTROPHILS # BLD AUTO: 5.38 K/UL — SIGNIFICANT CHANGE UP (ref 1.4–6.5)
NEUTROPHILS NFR BLD AUTO: 66 % — SIGNIFICANT CHANGE UP (ref 42.2–75.2)
NRBC # BLD: 0 /100 WBCS — SIGNIFICANT CHANGE UP (ref 0–0)
PLATELET # BLD AUTO: 354 K/UL — SIGNIFICANT CHANGE UP (ref 130–400)
PMV BLD: 10.1 FL — SIGNIFICANT CHANGE UP (ref 7.4–10.4)
POTASSIUM SERPL-MCNC: 3.6 MMOL/L — SIGNIFICANT CHANGE UP (ref 3.5–5)
POTASSIUM SERPL-SCNC: 3.6 MMOL/L — SIGNIFICANT CHANGE UP (ref 3.5–5)
PROT SERPL-MCNC: 7.1 G/DL — SIGNIFICANT CHANGE UP (ref 6–8)
RBC # BLD: 4.78 M/UL — SIGNIFICANT CHANGE UP (ref 4.2–5.4)
RBC # FLD: 16.4 % — HIGH (ref 11.5–14.5)
SODIUM SERPL-SCNC: 141 MMOL/L — SIGNIFICANT CHANGE UP (ref 135–146)
WBC # BLD: 8.17 K/UL — SIGNIFICANT CHANGE UP (ref 4.8–10.8)
WBC # FLD AUTO: 8.17 K/UL — SIGNIFICANT CHANGE UP (ref 4.8–10.8)

## 2024-06-02 PROCEDURE — 99232 SBSQ HOSP IP/OBS MODERATE 35: CPT

## 2024-06-02 RX ORDER — FUROSEMIDE 40 MG
40 TABLET ORAL DAILY
Refills: 0 | Status: DISCONTINUED | OUTPATIENT
Start: 2024-06-03 | End: 2024-06-06

## 2024-06-02 RX ADMIN — Medication 40 MILLIGRAM(S): at 05:40

## 2024-06-02 RX ADMIN — Medication 3 MILLIGRAM(S): at 21:17

## 2024-06-02 RX ADMIN — Medication 25 MILLIGRAM(S): at 17:11

## 2024-06-02 RX ADMIN — Medication 25 MILLIGRAM(S): at 05:39

## 2024-06-02 RX ADMIN — IRON SUCROSE 110 MILLIGRAM(S): 20 INJECTION, SOLUTION INTRAVENOUS at 08:34

## 2024-06-02 RX ADMIN — APIXABAN 5 MILLIGRAM(S): 2.5 TABLET, FILM COATED ORAL at 05:39

## 2024-06-02 RX ADMIN — LOSARTAN POTASSIUM 50 MILLIGRAM(S): 100 TABLET, FILM COATED ORAL at 05:39

## 2024-06-02 RX ADMIN — Medication 100 MICROGRAM(S): at 05:39

## 2024-06-02 RX ADMIN — Medication 30 MILLILITER(S): at 05:41

## 2024-06-02 RX ADMIN — ATORVASTATIN CALCIUM 40 MILLIGRAM(S): 80 TABLET, FILM COATED ORAL at 21:17

## 2024-06-02 RX ADMIN — Medication 40 MILLIGRAM(S): at 13:06

## 2024-06-02 RX ADMIN — POLYETHYLENE GLYCOL 3350 17 GRAM(S): 17 POWDER, FOR SOLUTION ORAL at 05:40

## 2024-06-02 RX ADMIN — APIXABAN 5 MILLIGRAM(S): 2.5 TABLET, FILM COATED ORAL at 17:11

## 2024-06-02 RX ADMIN — PANTOPRAZOLE SODIUM 40 MILLIGRAM(S): 20 TABLET, DELAYED RELEASE ORAL at 05:39

## 2024-06-02 NOTE — PROGRESS NOTE ADULT - SUBJECTIVE AND OBJECTIVE BOX
AMARA UMANZOR  79y  Female      Patient is a 79y old  Female who presents with a chief complaint of CHF (01 Jun 2024 13:43)      INTERVAL HPI/OVERNIGHT EVENTS:  She feels ok, she denies chest pain or SOB.   Vital Signs Last 24 Hrs  T(C): 37.1 (02 Jun 2024 12:35), Max: 37.1 (02 Jun 2024 12:35)  T(F): 98.8 (02 Jun 2024 12:35), Max: 98.8 (02 Jun 2024 12:35)  HR: 80 (02 Jun 2024 12:35) (52 - 100)  BP: 125/79 (02 Jun 2024 12:35) (98/46 - 138/86)  BP(mean): 94 (01 Jun 2024 18:11) (94 - 94)  RR: 17 (02 Jun 2024 12:35) (17 - 18)  SpO2: 93% (01 Jun 2024 20:45) (93% - 93%)    Parameters below as of 02 Jun 2024 12:35  Patient On (Oxygen Delivery Method): room air          06-01-24 @ 07:01  -  06-02-24 @ 07:00  --------------------------------------------------------  IN: 876 mL / OUT: 1250 mL / NET: -374 mL    06-02-24 @ 07:01  - 06-02-24 @ 14:00  --------------------------------------------------------  IN: 336 mL / OUT: 350 mL / NET: -14 mL            Consultant(s) Notes Reviewed:  [x ] YES  [ ] NO          MEDICATIONS  (STANDING):  apixaban 5 milliGRAM(s) Oral two times a day  atorvastatin 40 milliGRAM(s) Oral at bedtime  iron sucrose IVPB 200 milliGRAM(s) IV Intermittent every 24 hours  levothyroxine 100 MICROGram(s) Oral daily  losartan 50 milliGRAM(s) Oral daily  metoprolol tartrate 25 milliGRAM(s) Oral two times a day  oxycodone    5 mG/acetaminophen 325 mG 1 Tablet(s) Oral every 6 hours  pantoprazole    Tablet 40 milliGRAM(s) Oral before breakfast  polyethylene glycol 3350 17 Gram(s) Oral two times a day  senna 2 Tablet(s) Oral at bedtime    MEDICATIONS  (PRN):  aluminum hydroxide/magnesium hydroxide/simethicone Suspension 30 milliLiter(s) Oral every 4 hours PRN Dyspepsia  melatonin 3 milliGRAM(s) Oral at bedtime PRN Insomnia  ondansetron Injectable 4 milliGRAM(s) IV Push every 8 hours PRN Nausea and/or Vomiting      LABS                          11.5   8.17  )-----------( 354      ( 02 Jun 2024 06:48 )             38.2     06-02    141  |  102  |  15  ----------------------------<  104<H>  3.6   |  27  |  0.8    Ca    9.2      02 Jun 2024 06:48  Mg     2.4     06-02    TPro  7.1  /  Alb  4.5  /  TBili  0.6  /  DBili  x   /  AST  19  /  ALT  10  /  AlkPhos  66  06-02      Urinalysis Basic - ( 02 Jun 2024 06:48 )    Color: x / Appearance: x / SG: x / pH: x  Gluc: 104 mg/dL / Ketone: x  / Bili: x / Urobili: x   Blood: x / Protein: x / Nitrite: x   Leuk Esterase: x / RBC: x / WBC x   Sq Epi: x / Non Sq Epi: x / Bacteria: x        Lactate Trend        CAPILLARY BLOOD GLUCOSE            RADIOLOGY & ADDITIONAL TESTS:    Imaging Personally Reviewed:  [ ] YES  [ ] NO    HEALTH ISSUES - PROBLEM Dx:          PHYSICAL EXAM:  GENERAL: NAD, well-developed.  HEAD:  Atraumatic, Normocephalic.  EYES: EOMI, PERRLA, conjunctiva and sclera clear.  NECK: Supple, No JVD.  CHEST/LUNG: Clear to auscultation bilaterally; No wheeze.  HEART: Irregular rate and rhythm; S1 S2.   ABDOMEN: Soft, Nontender, Nondistended; Bowel sounds present.  EXTREMITIES:  2+ Peripheral Pulses, No clubbing, cyanosis, improved leg edema  PSYCH: AAOx3.  NEUROLOGY: non-focal.  SKIN: No rashes or lesions.

## 2024-06-02 NOTE — PROGRESS NOTE ADULT - ASSESSMENT
79Y/F with Pmhx of Obesity, Cataract, Hypothyroid, Vertigo, Insomnia, GERD, HTN, Osteoarthritis, H/O arm surgery, A-fib on Eliquis presented to the ED for the evaluation of shortness of breath and bilateral LE edema. The patient has had LE edema for 2 years. She was having SOB for 2 months on and off,  mostly exertional, worsening for last 3-4 days. 3 month ago, the patient visited her PCP and was diagnosed with Afib, started on Eliquis and Metoprolol. She was planned to get CCTA today AM. For this, she was prescribed Metoprolol tartrate 100mg last night. After the dose, her SOB got worse and therefore she presented to ED today.    A/P:   Acute HFpEF:   Mild, SOB, mild leg edema, [id number](was 998 in feb, 2024).  CXR showed mild pulmonary congestion, cardiomegaly.   Echo   On Lasix 40mg IV BID, switch to Lasix po 40mg daily.   Pending echo, CCTA.   Low sodium diet, fluid restriction.     Chronic Atrial Fibrillation:   Rate is controlled  Continue Metoprolol 25mg BID and Eliquis.     Microcytic anemia  iron deficiency anemia:   No melena or acute bleeding, ferritin 13, Iron 28, Iron sat 7%.   Started on Venofer IV, patient is on Eliquis, need further monitor outpatient to rule out GI bleeidng    HTN: Continue Losartan and Metoprolol, Amlodipine held for possible the cause of leg edema.   Hypothyroidism: continue with Levothyroxine, Statin    Constipation  Bowel regimen    #?LE OA  -On percocet q6hr prn     DVT Prophylaxis: Eliquis    #Progress Note Handoff:  Pending (specify): Echo, CCTA, home in 24 hrs  Family discussion:  Disposition: Home.

## 2024-06-03 LAB
ALBUMIN SERPL ELPH-MCNC: 4.1 G/DL — SIGNIFICANT CHANGE UP (ref 3.5–5.2)
ALP SERPL-CCNC: 71 U/L — SIGNIFICANT CHANGE UP (ref 30–115)
ALT FLD-CCNC: 10 U/L — SIGNIFICANT CHANGE UP (ref 0–41)
ANION GAP SERPL CALC-SCNC: 13 MMOL/L — SIGNIFICANT CHANGE UP (ref 7–14)
AST SERPL-CCNC: 17 U/L — SIGNIFICANT CHANGE UP (ref 0–41)
BASOPHILS # BLD AUTO: 0.07 K/UL — SIGNIFICANT CHANGE UP (ref 0–0.2)
BASOPHILS NFR BLD AUTO: 0.8 % — SIGNIFICANT CHANGE UP (ref 0–1)
BILIRUB SERPL-MCNC: 0.5 MG/DL — SIGNIFICANT CHANGE UP (ref 0.2–1.2)
BUN SERPL-MCNC: 16 MG/DL — SIGNIFICANT CHANGE UP (ref 10–20)
CALCIUM SERPL-MCNC: 9.4 MG/DL — SIGNIFICANT CHANGE UP (ref 8.4–10.4)
CHLORIDE SERPL-SCNC: 102 MMOL/L — SIGNIFICANT CHANGE UP (ref 98–110)
CO2 SERPL-SCNC: 27 MMOL/L — SIGNIFICANT CHANGE UP (ref 17–32)
CREAT SERPL-MCNC: 0.8 MG/DL — SIGNIFICANT CHANGE UP (ref 0.7–1.5)
EGFR: 74 ML/MIN/1.73M2 — SIGNIFICANT CHANGE UP
EOSINOPHIL # BLD AUTO: 0.24 K/UL — SIGNIFICANT CHANGE UP (ref 0–0.7)
EOSINOPHIL NFR BLD AUTO: 2.6 % — SIGNIFICANT CHANGE UP (ref 0–8)
GLUCOSE SERPL-MCNC: 100 MG/DL — HIGH (ref 70–99)
HCT VFR BLD CALC: 39.3 % — SIGNIFICANT CHANGE UP (ref 37–47)
HGB BLD-MCNC: 11.8 G/DL — LOW (ref 12–16)
IMM GRANULOCYTES NFR BLD AUTO: 0.3 % — SIGNIFICANT CHANGE UP (ref 0.1–0.3)
LYMPHOCYTES # BLD AUTO: 2.26 K/UL — SIGNIFICANT CHANGE UP (ref 1.2–3.4)
LYMPHOCYTES # BLD AUTO: 24.9 % — SIGNIFICANT CHANGE UP (ref 20.5–51.1)
MAGNESIUM SERPL-MCNC: 2.3 MG/DL — SIGNIFICANT CHANGE UP (ref 1.8–2.4)
MCHC RBC-ENTMCNC: 24 PG — LOW (ref 27–31)
MCHC RBC-ENTMCNC: 30 G/DL — LOW (ref 32–37)
MCV RBC AUTO: 80 FL — LOW (ref 81–99)
MONOCYTES # BLD AUTO: 0.74 K/UL — HIGH (ref 0.1–0.6)
MONOCYTES NFR BLD AUTO: 8.2 % — SIGNIFICANT CHANGE UP (ref 1.7–9.3)
NEUTROPHILS # BLD AUTO: 5.72 K/UL — SIGNIFICANT CHANGE UP (ref 1.4–6.5)
NEUTROPHILS NFR BLD AUTO: 63.2 % — SIGNIFICANT CHANGE UP (ref 42.2–75.2)
NRBC # BLD: 0 /100 WBCS — SIGNIFICANT CHANGE UP (ref 0–0)
PLATELET # BLD AUTO: 347 K/UL — SIGNIFICANT CHANGE UP (ref 130–400)
PMV BLD: 10.6 FL — HIGH (ref 7.4–10.4)
POTASSIUM SERPL-MCNC: 3.3 MMOL/L — LOW (ref 3.5–5)
POTASSIUM SERPL-SCNC: 3.3 MMOL/L — LOW (ref 3.5–5)
PROT SERPL-MCNC: 6.8 G/DL — SIGNIFICANT CHANGE UP (ref 6–8)
RBC # BLD: 4.91 M/UL — SIGNIFICANT CHANGE UP (ref 4.2–5.4)
RBC # FLD: 16.5 % — HIGH (ref 11.5–14.5)
SODIUM SERPL-SCNC: 142 MMOL/L — SIGNIFICANT CHANGE UP (ref 135–146)
TROPONIN T, HIGH SENSITIVITY RESULT: 13 NG/L — SIGNIFICANT CHANGE UP (ref 6–13)
WBC # BLD: 9.06 K/UL — SIGNIFICANT CHANGE UP (ref 4.8–10.8)
WBC # FLD AUTO: 9.06 K/UL — SIGNIFICANT CHANGE UP (ref 4.8–10.8)

## 2024-06-03 PROCEDURE — 99232 SBSQ HOSP IP/OBS MODERATE 35: CPT

## 2024-06-03 RX ORDER — METOPROLOL TARTRATE 50 MG
25 TABLET ORAL ONCE
Refills: 0 | Status: COMPLETED | OUTPATIENT
Start: 2024-06-03 | End: 2024-06-03

## 2024-06-03 RX ORDER — METOPROLOL TARTRATE 50 MG
50 TABLET ORAL
Refills: 0 | Status: DISCONTINUED | OUTPATIENT
Start: 2024-06-03 | End: 2024-06-06

## 2024-06-03 RX ORDER — POTASSIUM CHLORIDE 20 MEQ
40 PACKET (EA) ORAL ONCE
Refills: 0 | Status: COMPLETED | OUTPATIENT
Start: 2024-06-03 | End: 2024-06-03

## 2024-06-03 RX ADMIN — Medication 100 MICROGRAM(S): at 05:23

## 2024-06-03 RX ADMIN — POLYETHYLENE GLYCOL 3350 17 GRAM(S): 17 POWDER, FOR SOLUTION ORAL at 17:06

## 2024-06-03 RX ADMIN — Medication 25 MILLIGRAM(S): at 05:18

## 2024-06-03 RX ADMIN — APIXABAN 5 MILLIGRAM(S): 2.5 TABLET, FILM COATED ORAL at 17:05

## 2024-06-03 RX ADMIN — Medication 50 MILLIGRAM(S): at 17:06

## 2024-06-03 RX ADMIN — Medication 40 MILLIGRAM(S): at 05:19

## 2024-06-03 RX ADMIN — SENNA PLUS 2 TABLET(S): 8.6 TABLET ORAL at 21:11

## 2024-06-03 RX ADMIN — Medication 40 MILLIEQUIVALENT(S): at 12:24

## 2024-06-03 RX ADMIN — ONDANSETRON 4 MILLIGRAM(S): 8 TABLET, FILM COATED ORAL at 09:56

## 2024-06-03 RX ADMIN — APIXABAN 5 MILLIGRAM(S): 2.5 TABLET, FILM COATED ORAL at 05:18

## 2024-06-03 RX ADMIN — LOSARTAN POTASSIUM 50 MILLIGRAM(S): 100 TABLET, FILM COATED ORAL at 05:18

## 2024-06-03 RX ADMIN — Medication 30 MILLILITER(S): at 12:24

## 2024-06-03 RX ADMIN — ATORVASTATIN CALCIUM 40 MILLIGRAM(S): 80 TABLET, FILM COATED ORAL at 21:11

## 2024-06-03 RX ADMIN — Medication 25 MILLIGRAM(S): at 12:24

## 2024-06-03 RX ADMIN — IRON SUCROSE 110 MILLIGRAM(S): 20 INJECTION, SOLUTION INTRAVENOUS at 12:25

## 2024-06-03 RX ADMIN — PANTOPRAZOLE SODIUM 40 MILLIGRAM(S): 20 TABLET, DELAYED RELEASE ORAL at 05:23

## 2024-06-03 NOTE — PROGRESS NOTE ADULT - SUBJECTIVE AND OBJECTIVE BOX
AMARA UMANZOR 79y Female  MRN#: [id number]   Hospital Day: 4d    HPI:  A 79Y/F with Pmhx of Obesity, Cataract, Hypothyroid, Vertigo, Insomnia, GERD, HTN, Osteoarthritis, H/O arm surgery, A-fib on Eliquis presented to the ED for the evaluation of shortness of breath and bilateral LE edema. The patient has had LE edema for 2 years. She was having SOB for 2 months on and off,  mostly exertional, worsening for last 3-4 days. 3 month ago, the patient visited her PCP and was diagnosed with Afib, started on Eliquis and Metoprolol. She was planned to get CCTA today AM. For this, she was prescribed Metoprolol tartrate 100mg last night. After the dose, her SOB got worse and therefore she presented to ED today. The patient  also endorses congested sensation in her throat. The patient is chronically constipated. Denies any change in the diet. Denied chest pain, fever, chills, N/V/D, abdominal pain, sick contacts, cough, recent travel, headache, dizziness or LOC.   [place]  ID: [id number]    #ED Vitals:  T(C): 36.8 (05-30-24 @ 08:31), Max: 36.8 (05-30-24 @ 08:31)  HR: 74 (05-30-24 @ 08:31) (74 - 74)  BP: 147/87 (05-30-24 @ 08:31) (147/87 - 147/87)  RR: 18 (05-30-24 @ 08:31) (18 - 18)  SpO2: 99% (05-30-24 @ 08:31) (99% - 99%)    #Labs: Hb 10.5(baseline around 12-13), MCV 80.2, [id number](was 998 in feb, 2024), VBG: pCO2 49    #Imaging:  CXR: B/L Congestion, Left pleural effusion(official read awaited)    EKG: Afib , HR 65    The patient is being admitted to medicine for the further management.      (30 May 2024 14:36)      SUBJECTIVE  Patient is a 79y old Female who presents with a chief complaint of CHF (02 Jun 2024 13:59)  Currently admitted to medicine with the primary diagnosis of Shortness of breath      INTERVAL HPI AND OVERNIGHT EVENTS:  Patient was examined and seen at bedside. This morning she reports not feeling good. She feels dizzy and nauseous. She has stomach discomfort,   No chest pain or SOB       OBJECTIVE  PAST MEDICAL & SURGICAL HISTORY  Osteoarthritis  feet    HTN (hypertension)    GERD (gastroesophageal reflux disease)    H/O insomnia    Vertigo    Hypothyroid    Cataract    Obese    History of repair of hip fracture  AURE    H/O hand surgery  WRIST   SCREW AND AURE LEFT      ALLERGIES:  No Known Allergies    MEDICATIONS:  STANDING MEDICATIONS  apixaban 5 milliGRAM(s) Oral two times a day  atorvastatin 40 milliGRAM(s) Oral at bedtime  furosemide    Tablet 40 milliGRAM(s) Oral daily  iron sucrose IVPB 200 milliGRAM(s) IV Intermittent every 24 hours  levothyroxine 100 MICROGram(s) Oral daily  losartan 50 milliGRAM(s) Oral daily  metoprolol tartrate 25 milliGRAM(s) Oral two times a day  oxycodone    5 mG/acetaminophen 325 mG 1 Tablet(s) Oral every 6 hours  pantoprazole    Tablet 40 milliGRAM(s) Oral before breakfast  polyethylene glycol 3350 17 Gram(s) Oral two times a day  potassium chloride   Powder 40 milliEquivalent(s) Oral once  senna 2 Tablet(s) Oral at bedtime    PRN MEDICATIONS  aluminum hydroxide/magnesium hydroxide/simethicone Suspension 30 milliLiter(s) Oral every 4 hours PRN  melatonin 3 milliGRAM(s) Oral at bedtime PRN  ondansetron Injectable 4 milliGRAM(s) IV Push every 8 hours PRN      VITAL SIGNS: Last 24 Hours  T(C): 36.3 (03 Jun 2024 04:53), Max: 37.1 (02 Jun 2024 12:35)  T(F): 97.4 (03 Jun 2024 04:53), Max: 98.8 (02 Jun 2024 12:35)  HR: 98 (03 Jun 2024 04:53) (78 - 98)  BP: 128/86 (03 Jun 2024 04:53) (99/63 - 128/86)  BP(mean): --  RR: 18 (03 Jun 2024 04:53) (17 - 18)  SpO2: 96% (03 Jun 2024 00:54) (96% - 96%)    LABS:                        11.8   9.06  )-----------( 347      ( 03 Jun 2024 06:06 )             39.3     06-03    142  |  102  |  16  ----------------------------<  100<H>  3.3<L>   |  27  |  0.8    Ca    9.4      03 Jun 2024 06:06  Mg     2.3     06-03    TPro  6.8  /  Alb  4.1  /  TBili  0.5  /  DBili  x   /  AST  17  /  ALT  10  /  AlkPhos  71  06-03      Urinalysis Basic - ( 03 Jun 2024 06:06 )    Color: x / Appearance: x / SG: x / pH: x  Gluc: 100 mg/dL / Ketone: x  / Bili: x / Urobili: x   Blood: x / Protein: x / Nitrite: x   Leuk Esterase: x / RBC: x / WBC x   Sq Epi: x / Non Sq Epi: x / Bacteria: x        PHYSICAL EXAM:  CONSTITUTIONAL: No acute distress, well-developed, well-groomed, AAOx3  HEAD: Atraumatic, normocephalic  EYES: EOM intact, PERRLA, conjunctiva and sclera clear  ENT: Supple, no masses, no thyromegaly, no bruits, no JVD; moist mucous membranes  PULMONARY: Clear to auscultation bilaterally; no wheezes, rales, or rhonchi  CARDIOVASCULAR: Regular rate and rhythm; no murmurs, rubs, or gallops  GASTROINTESTINAL: Soft, non-tender, non-distended; bowel sounds present  MUSCULOSKELETAL: 2+ peripheral pulses; chronic non pitting edema   NEUROLOGY: non-focal  SKIN: No rashes or lesions; warm and dry    ASSESSMENT & PLAN  79Y/F with Pmhx of Obesity, Cataract, Hypothyroid, Vertigo, Insomnia, GERD, HTN, Osteoarthritis, H/O arm surgery, A-fib on Eliquis presented to the ED for the evaluation of shortness of breath and bilateral LE edema. The patient has had LE edema for 2 years. She was having SOB for 2 months on and off,  mostly exertional, worsening for last 3-4 days. 3 month ago, the patient visited her PCP and was diagnosed with Afib, started on Eliquis and Metoprolol. She was planned to get CCTA today AM. For this, she was prescribed Metoprolol tartrate 100mg last night. After the dose, her SOB got worse and therefore she presented to ED today.    #Acute HFpEF:   - Mild, SOB, mild leg edema, [id number](was 998 in feb, 2024).  - CXR showed mild pulmonary congestion, cardiomegaly.   - was Lasix 40mg IV BID, switch to Lasix po 40mg daily ( 6/2)  - Pending echo, CCTA.   - Low sodium diet, fluid restriction.   - remains on RA     #Chronic Atrial Fibrillation:   - Rate is controlled  - Continue Metoprolol 25mg BID and Eliquis.   - may need increase in metoprolol for rate control prior to CCTA as BP tolerates     #Microcytic anemia  #iron deficiency anemia:   - No melena or acute bleeding, ferritin 13, Iron 28, Iron sat 7%.   - Started on Venofer IV, patient is on Eliquis, need further monitor outpatient to rule out GI bleeidng    #HTN:   - Continue Losartan and Metoprolol, Amlodipine held for possible the cause of leg edema.     #Hypothyroidism: continue with Levothyroxine    #Constipation  Bowel regimen    #?LE OA  -On percocet q6hr prn     #Progress Note Handoff:  Pending (specify): Echo, CCTA    #Misc  - DVT Prophylaxis: eliquis   - Diet: NPO fo CCTA   - GI Prophylaxis: protonix   - Activity:as tolerated   - Code Status: full code   - Dispo: Home

## 2024-06-04 ENCOUNTER — TRANSCRIPTION ENCOUNTER (OUTPATIENT)
Age: 79
End: 2024-06-04

## 2024-06-04 LAB
ANION GAP SERPL CALC-SCNC: 12 MMOL/L — SIGNIFICANT CHANGE UP (ref 7–14)
BASOPHILS # BLD AUTO: 0.09 K/UL — SIGNIFICANT CHANGE UP (ref 0–0.2)
BASOPHILS NFR BLD AUTO: 0.9 % — SIGNIFICANT CHANGE UP (ref 0–1)
BUN SERPL-MCNC: 17 MG/DL — SIGNIFICANT CHANGE UP (ref 10–20)
CALCIUM SERPL-MCNC: 9.7 MG/DL — SIGNIFICANT CHANGE UP (ref 8.4–10.5)
CHLORIDE SERPL-SCNC: 102 MMOL/L — SIGNIFICANT CHANGE UP (ref 98–110)
CO2 SERPL-SCNC: 27 MMOL/L — SIGNIFICANT CHANGE UP (ref 17–32)
CREAT SERPL-MCNC: 0.7 MG/DL — SIGNIFICANT CHANGE UP (ref 0.7–1.5)
EGFR: 87 ML/MIN/1.73M2 — SIGNIFICANT CHANGE UP
EOSINOPHIL # BLD AUTO: 0.28 K/UL — SIGNIFICANT CHANGE UP (ref 0–0.7)
EOSINOPHIL NFR BLD AUTO: 2.9 % — SIGNIFICANT CHANGE UP (ref 0–8)
GLUCOSE SERPL-MCNC: 96 MG/DL — SIGNIFICANT CHANGE UP (ref 70–99)
HCT VFR BLD CALC: 42 % — SIGNIFICANT CHANGE UP (ref 37–47)
HGB BLD-MCNC: 12.3 G/DL — SIGNIFICANT CHANGE UP (ref 12–16)
IMM GRANULOCYTES NFR BLD AUTO: 0.4 % — HIGH (ref 0.1–0.3)
LYMPHOCYTES # BLD AUTO: 2.69 K/UL — SIGNIFICANT CHANGE UP (ref 1.2–3.4)
LYMPHOCYTES # BLD AUTO: 28.2 % — SIGNIFICANT CHANGE UP (ref 20.5–51.1)
MCHC RBC-ENTMCNC: 23.9 PG — LOW (ref 27–31)
MCHC RBC-ENTMCNC: 29.3 G/DL — LOW (ref 32–37)
MCV RBC AUTO: 81.6 FL — SIGNIFICANT CHANGE UP (ref 81–99)
MONOCYTES # BLD AUTO: 0.69 K/UL — HIGH (ref 0.1–0.6)
MONOCYTES NFR BLD AUTO: 7.2 % — SIGNIFICANT CHANGE UP (ref 1.7–9.3)
NEUTROPHILS # BLD AUTO: 5.74 K/UL — SIGNIFICANT CHANGE UP (ref 1.4–6.5)
NEUTROPHILS NFR BLD AUTO: 60.4 % — SIGNIFICANT CHANGE UP (ref 42.2–75.2)
NRBC # BLD: 0 /100 WBCS — SIGNIFICANT CHANGE UP (ref 0–0)
PLATELET # BLD AUTO: 369 K/UL — SIGNIFICANT CHANGE UP (ref 130–400)
PMV BLD: 10.6 FL — HIGH (ref 7.4–10.4)
POTASSIUM SERPL-MCNC: 3.9 MMOL/L — SIGNIFICANT CHANGE UP (ref 3.5–5)
POTASSIUM SERPL-SCNC: 3.9 MMOL/L — SIGNIFICANT CHANGE UP (ref 3.5–5)
RBC # BLD: 5.15 M/UL — SIGNIFICANT CHANGE UP (ref 4.2–5.4)
RBC # FLD: 17.1 % — HIGH (ref 11.5–14.5)
SODIUM SERPL-SCNC: 141 MMOL/L — SIGNIFICANT CHANGE UP (ref 135–146)
WBC # BLD: 9.53 K/UL — SIGNIFICANT CHANGE UP (ref 4.8–10.8)
WBC # FLD AUTO: 9.53 K/UL — SIGNIFICANT CHANGE UP (ref 4.8–10.8)

## 2024-06-04 PROCEDURE — 71045 X-RAY EXAM CHEST 1 VIEW: CPT | Mod: 26

## 2024-06-04 PROCEDURE — 75574 CT ANGIO HRT W/3D IMAGE: CPT | Mod: 26

## 2024-06-04 PROCEDURE — 99238 HOSP IP/OBS DSCHRG MGMT 30/<: CPT

## 2024-06-04 RX ORDER — LOSARTAN POTASSIUM 100 MG/1
1 TABLET, FILM COATED ORAL
Qty: 30 | Refills: 0
Start: 2024-06-04 | End: 2024-07-03

## 2024-06-04 RX ORDER — METOPROLOL TARTRATE 50 MG
5 TABLET ORAL ONCE
Refills: 0 | Status: DISCONTINUED | OUTPATIENT
Start: 2024-06-04 | End: 2024-06-04

## 2024-06-04 RX ORDER — METOPROLOL TARTRATE 50 MG
50 TABLET ORAL ONCE
Refills: 0 | Status: COMPLETED | OUTPATIENT
Start: 2024-06-04 | End: 2024-06-04

## 2024-06-04 RX ORDER — METOPROLOL TARTRATE 50 MG
5 TABLET ORAL ONCE
Refills: 0 | Status: COMPLETED | OUTPATIENT
Start: 2024-06-04 | End: 2024-06-04

## 2024-06-04 RX ORDER — LEVOTHYROXINE SODIUM 125 MCG
1 TABLET ORAL
Qty: 0 | Refills: 0 | DISCHARGE
Start: 2024-06-04

## 2024-06-04 RX ORDER — LOSARTAN POTASSIUM 100 MG/1
1 TABLET, FILM COATED ORAL
Qty: 0 | Refills: 0
Start: 2024-06-04

## 2024-06-04 RX ORDER — MECLIZINE HCL 12.5 MG
25 TABLET ORAL ONCE
Refills: 0 | Status: COMPLETED | OUTPATIENT
Start: 2024-06-04 | End: 2024-06-04

## 2024-06-04 RX ORDER — FUROSEMIDE 40 MG
1 TABLET ORAL
Qty: 0 | Refills: 0 | DISCHARGE
Start: 2024-06-04

## 2024-06-04 RX ORDER — LEVOTHYROXINE SODIUM 125 MCG
1 TABLET ORAL
Refills: 0 | DISCHARGE

## 2024-06-04 RX ORDER — AMLODIPINE BESYLATE 2.5 MG/1
1 TABLET ORAL
Refills: 0 | DISCHARGE

## 2024-06-04 RX ORDER — MECLIZINE HCL 12.5 MG
1 TABLET ORAL
Qty: 0 | Refills: 0 | DISCHARGE

## 2024-06-04 RX ORDER — MECLIZINE HCL 12.5 MG
1 TABLET ORAL
Qty: 90 | Refills: 0
Start: 2024-06-04 | End: 2024-07-03

## 2024-06-04 RX ADMIN — Medication 5 MILLIGRAM(S): at 11:18

## 2024-06-04 RX ADMIN — Medication 50 MILLIGRAM(S): at 11:47

## 2024-06-04 RX ADMIN — Medication 25 MILLIGRAM(S): at 19:44

## 2024-06-04 RX ADMIN — SENNA PLUS 2 TABLET(S): 8.6 TABLET ORAL at 21:37

## 2024-06-04 RX ADMIN — Medication 100 MICROGRAM(S): at 06:05

## 2024-06-04 RX ADMIN — Medication 5 MILLIGRAM(S): at 09:50

## 2024-06-04 RX ADMIN — POLYETHYLENE GLYCOL 3350 17 GRAM(S): 17 POWDER, FOR SOLUTION ORAL at 17:50

## 2024-06-04 RX ADMIN — APIXABAN 5 MILLIGRAM(S): 2.5 TABLET, FILM COATED ORAL at 06:11

## 2024-06-04 RX ADMIN — Medication 40 MILLIGRAM(S): at 06:05

## 2024-06-04 RX ADMIN — APIXABAN 5 MILLIGRAM(S): 2.5 TABLET, FILM COATED ORAL at 17:50

## 2024-06-04 RX ADMIN — Medication 50 MILLIGRAM(S): at 06:06

## 2024-06-04 RX ADMIN — Medication 5 MILLIGRAM(S): at 08:46

## 2024-06-04 RX ADMIN — IRON SUCROSE 110 MILLIGRAM(S): 20 INJECTION, SOLUTION INTRAVENOUS at 11:48

## 2024-06-04 RX ADMIN — PANTOPRAZOLE SODIUM 40 MILLIGRAM(S): 20 TABLET, DELAYED RELEASE ORAL at 06:06

## 2024-06-04 RX ADMIN — LOSARTAN POTASSIUM 50 MILLIGRAM(S): 100 TABLET, FILM COATED ORAL at 06:06

## 2024-06-04 RX ADMIN — ATORVASTATIN CALCIUM 40 MILLIGRAM(S): 80 TABLET, FILM COATED ORAL at 21:36

## 2024-06-04 NOTE — DISCHARGE NOTE PROVIDER - NSDCFUADDAPPT_GEN_ALL_CORE_FT
APPTS ARE READY TO BE MADE: [x] YES    Best Family or Patient Contact (if needed): son     Additional Information about above appointments (if needed):    1: Follow up with cardiology for CHF exacerbation   2:   3:     Other comments or requests:

## 2024-06-04 NOTE — DISCHARGE NOTE PROVIDER - NSDCMRMEDTOKEN_GEN_ALL_CORE_FT
Eliquis 5 mg oral tablet: 1 tab(s) orally 2 times a day  furosemide 40 mg oral tablet: 1 tab(s) orally once a day  levothyroxine 100 mcg (0.1 mg) oral tablet: 1 tab(s) orally once a day  losartan 50 mg oral tablet: 1 tab(s) orally once a day  meclizine 12.5 mg oral tablet: 1 tab(s) orally 3 times a day  metoprolol tartrate 25 mg oral tablet: 1 tab(s) orally 2 times a day  omeprazole 40 mg oral delayed release capsule: 1 cap(s) orally once a day  Percocet 5 mg-325 mg oral tablet: 1 tab(s) orally every 6 hours as needed for  severe pain  pravastatin 40 mg oral tablet: 1 tab(s) orally once a day  Vitamin D3 50 mcg (2000 intl units) oral capsule: 1 cap(s) orally once a day   Eliquis 5 mg oral tablet: 1 tab(s) orally 2 times a day  furosemide 40 mg oral tablet: 1 tab(s) orally once a day  levothyroxine 100 mcg (0.1 mg) oral tablet: 1 tab(s) orally once a day  losartan 50 mg oral tablet: 1 tab(s) orally once a day  meclizine 12.5 mg oral tablet: 1 tab(s) orally 3 times a day as needed for  dizziness  metoprolol tartrate 25 mg oral tablet: 1 tab(s) orally 2 times a day  omeprazole 40 mg oral delayed release capsule: 1 cap(s) orally once a day  Percocet 5 mg-325 mg oral tablet: 1 tab(s) orally every 6 hours as needed for  severe pain  pravastatin 40 mg oral tablet: 1 tab(s) orally once a day  Vitamin D3 50 mcg (2000 intl units) oral capsule: 1 cap(s) orally once a day

## 2024-06-04 NOTE — DISCHARGE NOTE PROVIDER - CARE PROVIDER_API CALL
CAMILO BOYCE  [address] [place] [address]  Phone: [phone]  Fax: [phone]  Follow Up Time: 2 weeks

## 2024-06-04 NOTE — DISCHARGE NOTE PROVIDER - HOSPITAL COURSE
A 79Y/F with Pmhx of Obesity, Cataract, Hypothyroid, Vertigo, Insomnia, GERD, HTN, Osteoarthritis, H/O arm surgery, A-fib on Eliquis presented to the ED for the evaluation of shortness of breath and bilateral LE edema. The patient has had LE edema for 2 years. She was having SOB for 2 months on and off,  mostly exertional, worsening for last 3-4 days. 3 month ago, the patient visited her PCP and was diagnosed with Afib, started on Eliquis and Metoprolol. She was planned to get CCTA today AM. For this, she was prescribed Metoprolol tartrate 100mg last night. After the dose, her SOB got worse and therefore she presented to ED    #Acute HFpEF:   - Mild, SOB, mild leg edema, [id number](was 998 in feb, 2024).  - CXR showed mild pulmonary congestion, cardiomegaly.   - was Lasix 40mg IV BID, switch to Lasix po 40mg daily ( 6/2)  - Pending echo, CCTA.   - Low sodium diet, fluid restriction.   - remains on RA     #Chronic Atrial Fibrillation:   - Rate is controlled  - Continue Metoprolol 25mg BID and Eliquis.   - may need increase in metoprolol for rate control prior to CCTA as BP tolerates     #Microcytic anemia  #iron deficiency anemia:   - No melena or acute bleeding, ferritin 13, Iron 28, Iron sat 7%.   - Started on Venofer IV, patient is on Eliquis, need further monitor outpatient to rule out GI bleeidng    #HTN:   - Continue Losartan and Metoprolol, Amlodipine held for possible the cause of leg edema.     #Hypothyroidism: continue with Levothyroxine    #Constipation  Bowel regimen    #?LE OA  -On percocet q6hr prn      A 79Y/F with Pmhx of Obesity, Cataract, Hypothyroid, Vertigo, Insomnia, GERD, HTN, Osteoarthritis, H/O arm surgery, A-fib on Eliquis presented to the ED for the evaluation of shortness of breath and bilateral LE edema. The patient has had LE edema for 2 years. She was having SOB for 2 months on and off,  mostly exertional, worsening for last 3-4 days. 3 month ago, the patient visited her PCP and was diagnosed with Afib, started on Eliquis and Metoprolol. She was planned to get CCTA today AM. For this, she was prescribed Metoprolol tartrate 100mg last night. After the dose, her SOB got worse and therefore she presented to ED    #Acute HFpEF:   - Mild, SOB, mild leg edema, [id number](was 998 in feb, 2024).  - CXR showed mild pulmonary congestion, cardiomegaly.   - was Lasix 40mg IV BID, switch to Lasix po 40mg daily ( 6/2)  - Pending echo, CCTA.   - Low sodium diet, fluid restriction.   - remains on RA     #Chronic Atrial Fibrillation:   - Rate is controlled  - Continue Metoprolol 25mg BID and Eliquis.   - may need increase in metoprolol for rate control prior to CCTA as BP tolerates     #Microcytic anemia  #iron deficiency anemia:   - No melena or acute bleeding, ferritin 13, Iron 28, Iron sat 7%.   - Started on Venofer IV, patient is on Eliquis, need further monitor outpatient to rule out GI bleeidng    #HTN:   - Continue Losartan and Metoprolol, Amlodipine held for possible the cause of leg edema.     #Hypothyroidism: continue with Levothyroxine    #Constipation  Bowel regimen    #?LE OA  -On percocet q6hr prn     Attending    Seen this morning  Stable, intermittent GI complaints, addressed  Vitals stable  Cta b/l  rrr    ECHO-reviewed    Plan as above  Stable for discharge     A 79Y/F with Pmhx of Obesity, Cataract, Hypothyroid, Vertigo, Insomnia, GERD, HTN, Osteoarthritis, H/O arm surgery, A-fib on Eliquis presented to the ED for the evaluation of shortness of breath and bilateral LE edema. The patient has had LE edema for 2 years. She was having SOB for 2 months on and off,  mostly exertional, worsening for last 3-4 days. 3 month ago, the patient visited her PCP and was diagnosed with Afib, started on Eliquis and Metoprolol. She was planned to get CCTA today AM. For this, she was prescribed Metoprolol tartrate 100mg last night. After the dose, her SOB got worse and therefore she presented to ED    #Acute HFpEF:   - Mild, SOB, mild leg edema, [id number](was 998 in feb, 2024).  - CXR showed mild pulmonary congestion, cardiomegaly.   - was Lasix 40mg IV BID, switch to Lasix po 40mg daily ( 6/2)  - CCTA done   - TTE done: NL EF   - Low sodium diet, fluid restriction.   - remains on RA     #Chronic Atrial Fibrillation:   - Rate is controlled  - Continue Metoprolol 25mg BID and Eliquis.   - may need increase in metoprolol for rate control prior to CCTA as BP tolerates     #Microcytic anemia  #iron deficiency anemia:   - No melena or acute bleeding, ferritin 13, Iron 28, Iron sat 7%.   - Started on Venofer IV, patient is on Eliquis, need further monitor outpatient to rule out GI bleeidng    #HTN:   - Continue Losartan and Metoprolol, Amlodipine held for possible the cause of leg edema.     #Hypothyroidism: continue with Levothyroxine    #Constipation  Bowel regimen    #?LE OA  -On percocet q6hr prn     Attending    Seen this morning  Stable, intermittent GI complaints, addressed  Vitals stable  Cta b/l  rrr    ECHO-reviewed    Plan as above  Stable for discharge

## 2024-06-04 NOTE — DISCHARGE NOTE PROVIDER - NSDCCPCAREPLAN_GEN_ALL_CORE_FT
PRINCIPAL DISCHARGE DIAGNOSIS  Diagnosis: Shortness of breath  Assessment and Plan of Treatment: You presented to the hospital with SOB. You had volume overload due to congestive heart failure. You were started on diuretics to remove the extra fluids in your body. Your symptoms improved. You will be discharged on lasix 40 mg OD which you need to take to prevent this from happening again. You also had a CCTA done to check for coronary atherosclerosis.   We also changed your blood pressure medication from amlodipine to losartan.  You also have atrial fibrillation which is irregular heart rate. You will need tpo take metoprolol to control your heart rate and eliquis to prevent clots from happeneing.   You need to follow up with your primary care doctor and cardiology as outpatient.   Please come back to the hospital if you feel any chest pain or shortness of breath.

## 2024-06-04 NOTE — DISCHARGE NOTE PROVIDER - NSDCQMAMI_CARD_ALL_CORE
"Chloe Obando is a 22 year old female who is being evaluated via a billable video visit.      The patient has been notified of following:     \"This video visit will be conducted via a call between you and your physician/provider. We have found that certain health care needs can be provided without the need for an in-person physical exam.  This service lets us provide the care you need with a video conversation.  If a prescription is necessary we can send it directly to your pharmacy.  If lab work is needed we can place an order for that and you can then stop by our lab to have the test done at a later time.    Video visits are billed at different rates depending on your insurance coverage.  Please reach out to your insurance provider with any questions.    If during the course of the call the physician/provider feels a video visit is not appropriate, you will not be charged for this service.\"    Patient has given verbal consent for Video visit? Yes    How would you like to [place]    Patient would like the video invitation sent by: Text to cell phone: [phone]    Will anyone else be joining your video visit? No        Subjective     Chloe Obando is a 22 year old female who presents to clinic today for the following health issues:    HPI     Depression Followup    How are you doing with your depression since your last visit? No change    Are you having other symptoms that might be associated with depression? Yes:  she says she has still been struggling with a little bit of insonia.     Have you had a significant life event?  OTHER: covid-19     Are you feeling anxious or having panic attacks?   Yes:  she says she has had a few, but nothing significant.    Do you have any concerns with your use of alcohol or other drugs? No    Social History     Tobacco Use     Smoking status: Never Smoker     Smokeless tobacco: Never Used   Substance Use Topics     Alcohol use: Yes     Comment: Occasion     Drug " use: No     PHQ 10/8/2019 2/5/2020 5/8/2020   PHQ-9 Total Score 7 7 6   Q9: Thoughts of better off dead/self-harm past 2 weeks Not at all Not at all Not at all     KIN-7 SCORE 10/8/2019 2/5/2020 5/8/2020   Total Score 3 (minimal anxiety) 9 (mild anxiety) -   Total Score 3 9 8       Suicide Assessment Five-step Evaluation and Treatment (SAFE-T)      How many servings of fruits and vegetables do you eat daily?  2-3    On average, how many sweetened beverages do you drink each day (Examples: soda, juice, sweet tea, etc.  Do NOT count diet or artificially sweetened beverages)?   2    How many days per week do you exercise enough to make your heart beat faster? 5    How many minutes a day do you exercise enough to make your heart beat faster? 60 or more    How many days per week do you miss taking your medication? 0      Video Start Time: 0905    Doing well on effexor - happy with current dose.   Feels like it has done well at 'controlling' her depression and is better able to talk herself down from anxiety  No side effects unless she misses a dose which has only happened once or twice    Has a question about birth control  Started on it last fall due to abnormal/irregular periods  Has h/o PCOS  Labs and imaging done in Oct were normal other than imaging confirming the PCOS  She really doesn't like the birth control - trying to lose weight and feels she struggles more with that on birth control  Wondering if she could stop it  Was seeing a gynecologist awhile ago and does note she was on metformin, a prenatal and folic acid for PCOS but states it was too much for her to take      BP Readings from Last 3 Encounters:   02/14/20 137/89   01/03/20 123/84   10/11/19 132/80    Wt Readings from Last 3 Encounters:   02/14/20 96.2 kg (212 lb)   01/03/20 96.6 kg (212 lb 14.4 oz)   10/11/19 94.3 kg (208 lb)                    Reviewed and updated as needed this visit by Provider         Review of Systems   Remainder of ROS obtained  "and found to be negative other than that which was documented above        Objective    There were no vitals taken for this visit.  Estimated body mass index is 39.41 kg/m  as calculated from the following:    Height as of 2/14/20: 1.562 m (5' 1.5\").    Weight as of 2/14/20: 96.2 kg (212 lb).  Physical Exam     GENERAL: healthy, alert and no distress  EYES: Eyes grossly normal to inspection, conjunctivae and sclerae normal  RESP: no audible wheeze, cough, or visible cyanosis.  No visible retractions or increased work of breathing.  Able to speak fully in complete sentences.  NEURO: Cranial nerves grossly intact, mentation intact and speech normal  PSYCH: mentation appears normal, affect normal/bright, judgement and insight intact, normal speech and appearance well-groomed      Diagnostic Test Results:  none         Assessment & Plan     (E28.2) PCOS (polycystic ovarian syndrome)  (primary encounter diagnosis)  Comment: discussed management of PCOS. If she chooses to stop the pill, we did discuss that periods may again become more irregular and maybe more importantly, she would need to be very good about using condoms or other contraception if she is sexually active as understanding when she ovulates given the abnormal cycle will be difficult. I mentioned metformin again given she mentioned concerns with weight. She will think about it and let us know if it is something she would like to restart  Plan: consider metformin    (F33.1) Moderate episode of recurrent major depressive disorder (H)  Comment: doing well. Will continue at current dose  Plan: venlafaxine (EFFEXOR-XR) 150 MG 24 hr capsule              Return in about 3 months (around 8/8/2020) for Physical Exam.    Mabel Davison PA-C  [facility]      Video-Visit Details    Type of service:  Video Visit    Video End Time:0920    Originating Location (pt. Location): Home    Distant Location (provider location):  [facility]     Platform " used for Video Visit: Ritika    Return in about 3 months (around 8/8/2020) for Physical Exam.       Mabel Davison PA-C       No

## 2024-06-04 NOTE — DISCHARGE NOTE PROVIDER - ATTENDING DISCHARGE PHYSICAL EXAMINATION:
VITALS:   T(C): 36.6 (06-06-24 @ 04:49), Max: 36.7 (06-05-24 @ 20:10)  HR: 90 (06-06-24 @ 04:49) (80 - 90)  BP: 129/85 (06-06-24 @ 04:49) (117/73 - 129/85)  RR: 18 (06-06-24 @ 04:49) (18 - 18)  SpO2: --    GENERAL: NAD, lying in bed comfortably  HEART: Regular rate and rhythm,  LUNGS: Unlabored respirations.  Clear to auscultation bilaterally,  ABDOMEN: Soft, nontender, nondistended, +BS  EXTREMITIES: 2+ peripheral pulses bilaterally.  NERVOUS SYSTEM:  A&Ox3,

## 2024-06-05 PROCEDURE — 99232 SBSQ HOSP IP/OBS MODERATE 35: CPT

## 2024-06-05 RX ORDER — MECLIZINE HCL 12.5 MG
12.5 TABLET ORAL EVERY 8 HOURS
Refills: 0 | Status: DISCONTINUED | OUTPATIENT
Start: 2024-06-05 | End: 2024-06-06

## 2024-06-05 RX ADMIN — Medication 12.5 MILLIGRAM(S): at 17:04

## 2024-06-05 RX ADMIN — Medication 100 MICROGRAM(S): at 05:52

## 2024-06-05 RX ADMIN — Medication 12.5 MILLIGRAM(S): at 21:31

## 2024-06-05 RX ADMIN — APIXABAN 5 MILLIGRAM(S): 2.5 TABLET, FILM COATED ORAL at 17:04

## 2024-06-05 RX ADMIN — Medication 50 MILLIGRAM(S): at 05:53

## 2024-06-05 RX ADMIN — LOSARTAN POTASSIUM 50 MILLIGRAM(S): 100 TABLET, FILM COATED ORAL at 05:53

## 2024-06-05 RX ADMIN — PANTOPRAZOLE SODIUM 40 MILLIGRAM(S): 20 TABLET, DELAYED RELEASE ORAL at 05:53

## 2024-06-05 RX ADMIN — POLYETHYLENE GLYCOL 3350 17 GRAM(S): 17 POWDER, FOR SOLUTION ORAL at 05:51

## 2024-06-05 RX ADMIN — ATORVASTATIN CALCIUM 40 MILLIGRAM(S): 80 TABLET, FILM COATED ORAL at 21:31

## 2024-06-05 RX ADMIN — APIXABAN 5 MILLIGRAM(S): 2.5 TABLET, FILM COATED ORAL at 05:52

## 2024-06-05 RX ADMIN — Medication 40 MILLIGRAM(S): at 05:52

## 2024-06-05 RX ADMIN — Medication 12.5 MILLIGRAM(S): at 10:36

## 2024-06-05 RX ADMIN — Medication 50 MILLIGRAM(S): at 17:04

## 2024-06-05 NOTE — PROGRESS NOTE ADULT - SUBJECTIVE AND OBJECTIVE BOX
AMARA UMANZOR 79y Female  MRN#: [id number]   Hospital Day: 4d    HPI:  A 79Y/F with Pmhx of Obesity, Cataract, Hypothyroid, Vertigo, Insomnia, GERD, HTN, Osteoarthritis, H/O arm surgery, A-fib on Eliquis presented to the ED for the evaluation of shortness of breath and bilateral LE edema. The patient has had LE edema for 2 years. She was having SOB for 2 months on and off,  mostly exertional, worsening for last 3-4 days. 3 month ago, the patient visited her PCP and was diagnosed with Afib, started on Eliquis and Metoprolol. She was planned to get CCTA today AM. For this, she was prescribed Metoprolol tartrate 100mg last night. After the dose, her SOB got worse and therefore she presented to ED today. The patient  also endorses congested sensation in her throat. The patient is chronically constipated. Denies any change in the diet. Denied chest pain, fever, chills, N/V/D, abdominal pain, sick contacts, cough, recent travel, headache, dizziness or LOC.   [place]  ID: [id number]    #ED Vitals:  T(C): 36.8 (05-30-24 @ 08:31), Max: 36.8 (05-30-24 @ 08:31)  HR: 74 (05-30-24 @ 08:31) (74 - 74)  BP: 147/87 (05-30-24 @ 08:31) (147/87 - 147/87)  RR: 18 (05-30-24 @ 08:31) (18 - 18)  SpO2: 99% (05-30-24 @ 08:31) (99% - 99%)    #Labs: Hb 10.5(baseline around 12-13), MCV 80.2, [id number](was 998 in feb, 2024), VBG: pCO2 49    #Imaging:  CXR: B/L Congestion, Left pleural effusion(official read awaited)    EKG: Afib , HR 65    The patient is being admitted to medicine for the further management.      (30 May 2024 14:36)      SUBJECTIVE  Patient is a 79y old Female who presents with a chief complaint of CHF (02 Jun 2024 13:59)  Currently admitted to medicine with the primary diagnosis of Shortness of breath      INTERVAL HPI AND OVERNIGHT EVENTS:  Patient was examined and seen at bedside. This morning she reports not feeling good. She feels dizzy and nauseous. She has stomach discomfort,   No chest pain or SOB       OBJECTIVE  PAST MEDICAL & SURGICAL HISTORY  Osteoarthritis  feet    HTN (hypertension)    GERD (gastroesophageal reflux disease)    H/O insomnia    Vertigo    Hypothyroid    Cataract    Obese    History of repair of hip fracture  AURE    H/O hand surgery  WRIST   SCREW AND AURE LEFT      ALLERGIES:  No Known Allergies    MEDICATIONS:  STANDING MEDICATIONS  apixaban 5 milliGRAM(s) Oral two times a day  atorvastatin 40 milliGRAM(s) Oral at bedtime  furosemide    Tablet 40 milliGRAM(s) Oral daily  iron sucrose IVPB 200 milliGRAM(s) IV Intermittent every 24 hours  levothyroxine 100 MICROGram(s) Oral daily  losartan 50 milliGRAM(s) Oral daily  metoprolol tartrate 25 milliGRAM(s) Oral two times a day  oxycodone    5 mG/acetaminophen 325 mG 1 Tablet(s) Oral every 6 hours  pantoprazole    Tablet 40 milliGRAM(s) Oral before breakfast  polyethylene glycol 3350 17 Gram(s) Oral two times a day  potassium chloride   Powder 40 milliEquivalent(s) Oral once  senna 2 Tablet(s) Oral at bedtime    PRN MEDICATIONS  aluminum hydroxide/magnesium hydroxide/simethicone Suspension 30 milliLiter(s) Oral every 4 hours PRN  melatonin 3 milliGRAM(s) Oral at bedtime PRN  ondansetron Injectable 4 milliGRAM(s) IV Push every 8 hours PRN      VITAL SIGNS: Last 24 Hours  T(C): 36.3 (03 Jun 2024 04:53), Max: 37.1 (02 Jun 2024 12:35)  T(F): 97.4 (03 Jun 2024 04:53), Max: 98.8 (02 Jun 2024 12:35)  HR: 98 (03 Jun 2024 04:53) (78 - 98)  BP: 128/86 (03 Jun 2024 04:53) (99/63 - 128/86)  BP(mean): --  RR: 18 (03 Jun 2024 04:53) (17 - 18)  SpO2: 96% (03 Jun 2024 00:54) (96% - 96%)    LABS:                        11.8   9.06  )-----------( 347      ( 03 Jun 2024 06:06 )             39.3     06-03    142  |  102  |  16  ----------------------------<  100<H>  3.3<L>   |  27  |  0.8    Ca    9.4      03 Jun 2024 06:06  Mg     2.3     06-03    TPro  6.8  /  Alb  4.1  /  TBili  0.5  /  DBili  x   /  AST  17  /  ALT  10  /  AlkPhos  71  06-03      Urinalysis Basic - ( 03 Jun 2024 06:06 )    Color: x / Appearance: x / SG: x / pH: x  Gluc: 100 mg/dL / Ketone: x  / Bili: x / Urobili: x   Blood: x / Protein: x / Nitrite: x   Leuk Esterase: x / RBC: x / WBC x   Sq Epi: x / Non Sq Epi: x / Bacteria: x        PHYSICAL EXAM:  CONSTITUTIONAL: No acute distress, well-developed, well-groomed, AAOx3  HEAD: Atraumatic, normocephalic  EYES: EOM intact, PERRLA, conjunctiva and sclera clear  ENT: Supple, no masses, no thyromegaly, no bruits, no JVD; moist mucous membranes  PULMONARY: Clear to auscultation bilaterally; no wheezes, rales, or rhonchi  CARDIOVASCULAR: Regular rate and rhythm; no murmurs, rubs, or gallops  GASTROINTESTINAL: Soft, non-tender, non-distended; bowel sounds present  MUSCULOSKELETAL: 2+ peripheral pulses; chronic non pitting edema   NEUROLOGY: non-focal  SKIN: No rashes or lesions; warm and dry    ASSESSMENT & PLAN  79Y/F with Pmhx of Obesity, Cataract, Hypothyroid, Vertigo, Insomnia, GERD, HTN, Osteoarthritis, H/O arm surgery, A-fib on Eliquis presented to the ED for the evaluation of shortness of breath and bilateral LE edema. The patient has had LE edema for 2 years. She was having SOB for 2 months on and off,  mostly exertional, worsening for last 3-4 days. 3 month ago, the patient visited her PCP and was diagnosed with Afib, started on Eliquis and Metoprolol. She was planned to get CCTA today AM. For this, she was prescribed Metoprolol tartrate 100mg last night. After the dose, her SOB got worse and therefore she presented to ED today.    #Dizziness   - not spinning   - pt on home meclizine, resumed   - BP stable   - was able to walk with PT   - will monitor     #Acute HFpEF:   - Mild, SOB, mild leg edema, [id number](was 998 in feb, 2024).  - CXR showed mild pulmonary congestion, cardiomegaly.   - was Lasix 40mg IV BID, switch to Lasix po 40mg daily ( 6/2)  - TTE: NL EF   - CCTA done, results noted   - Low sodium diet, fluid restriction.   - remains on RA     #Chronic Atrial Fibrillation:   - Rate is controlled  - Continue Metoprolol 25mg BID and Eliquis.   - may need increase in metoprolol for rate control prior to CCTA as BP tolerates     #Microcytic anemia  #iron deficiency anemia:   - No melena or acute bleeding, ferritin 13, Iron 28, Iron sat 7%.   - Started on Venofer IV, patient is on Eliquis, need further monitor outpatient to rule out GI bleeidng    #HTN:   - Continue Losartan and Metoprolol, Amlodipine held for possible the cause of leg edema.     #Hypothyroidism: continue with Levothyroxine    #Constipation  Bowel regimen    #?LE OA  -On percocet q6hr prn     #Progress Note Handoff:  Pending (specify): Echo, CCTA    #Misc  - DVT Prophylaxis: eliquis   - Diet: NPO fo CCTA   - GI Prophylaxis: protonix   - Activity:as tolerated   - Code Status: full code   - Dispo: Home

## 2024-06-05 NOTE — PROGRESS NOTE ADULT - ATTENDING COMMENTS
seen this morning and discussed with resident  patient refused to leave yesterday  Walked today with physical therapist but still refusing to leave today  no orthostatics, complaining of non-specific dizziness  vitals stable, BP normal    cardiac workup normal    Anticipate d/c tomorrow on baseline meclizine
seen this morning  no chest pain but c/o mild epigastric discomfort    Vitals stable  cta b/l  rrr s1s2  eomi    a/p    #Acute HFpEF:    Lasix po 40mg daily   -  CCTA if possible, otherwise outpt  - Low sodium diet, fluid restriction.     #Chronic Atrial Fibrillation:   - Continue Metoprolol 25mg BID and Eliquis.   - may need increase in metoprolol for rate control prior to CCTA as BP tolerates     #Microcytic anemia  #iron deficiency anemia:   - patient is on Eliquis, need further monitor outpatient to rule out GI bleeding    #HTN:   - Continue Losartan and Metoprolol, Amlodipine held for possible the cause of leg edema.     #Hypothyroidism: continue with Levothyroxine    #Constipation  Bowel regimen    Hopeful d/c today  d/w resident

## 2024-06-06 VITALS
DIASTOLIC BLOOD PRESSURE: 85 MMHG | SYSTOLIC BLOOD PRESSURE: 129 MMHG | HEART RATE: 90 BPM | TEMPERATURE: 98 F | WEIGHT: 222.67 LBS | RESPIRATION RATE: 18 BRPM

## 2024-06-06 PROCEDURE — 99231 SBSQ HOSP IP/OBS SF/LOW 25: CPT

## 2024-06-06 RX ORDER — CHLORHEXIDINE GLUCONATE 213 G/1000ML
1 SOLUTION TOPICAL
Refills: 0 | Status: DISCONTINUED | OUTPATIENT
Start: 2024-06-06 | End: 2024-06-06

## 2024-06-06 RX ADMIN — PANTOPRAZOLE SODIUM 40 MILLIGRAM(S): 20 TABLET, DELAYED RELEASE ORAL at 05:55

## 2024-06-06 RX ADMIN — Medication 12.5 MILLIGRAM(S): at 05:55

## 2024-06-06 RX ADMIN — LOSARTAN POTASSIUM 50 MILLIGRAM(S): 100 TABLET, FILM COATED ORAL at 05:55

## 2024-06-06 RX ADMIN — APIXABAN 5 MILLIGRAM(S): 2.5 TABLET, FILM COATED ORAL at 05:55

## 2024-06-06 RX ADMIN — POLYETHYLENE GLYCOL 3350 17 GRAM(S): 17 POWDER, FOR SOLUTION ORAL at 05:54

## 2024-06-06 RX ADMIN — Medication 100 MICROGRAM(S): at 05:55

## 2024-06-06 RX ADMIN — Medication 50 MILLIGRAM(S): at 05:55

## 2024-06-06 RX ADMIN — Medication 40 MILLIGRAM(S): at 05:55

## 2024-06-06 NOTE — PROGRESS NOTE ADULT - SUBJECTIVE AND OBJECTIVE BOX
AMARA UMANZOR  79y  [facility]      Patient is a 79y old  Female who presents with a chief complaint of CHF (05 Jun 2024 14:34)      INTERVAL HPI/OVERNIGHT EVENTS:        REVIEW OF SYSTEMS:        FAMILY HISTORY:    T(C): 36.6 (06-06-24 @ 04:49), Max: 36.7 (06-05-24 @ 12:13)  HR: 90 (06-06-24 @ 04:49) (77 - 90)  BP: 129/85 (06-06-24 @ 04:49) (117/73 - 138/81)  RR: 18 (06-06-24 @ 04:49) (18 - 18)  SpO2: --  Wt(kg): --Vital Signs Last 24 Hrs  T(C): 36.6 (06 Jun 2024 04:49), Max: 36.7 (05 Jun 2024 12:13)  T(F): 97.9 (06 Jun 2024 04:49), Max: 98.1 (05 Jun 2024 20:10)  HR: 90 (06 Jun 2024 04:49) (77 - 90)  BP: 129/85 (06 Jun 2024 04:49) (117/73 - 138/81)  BP(mean): --  RR: 18 (06 Jun 2024 04:49) (18 - 18)  SpO2: --        PHYSICAL EXAM:  GENERAL: NAD, well-groomed, well-developed  HEAD:  Atraumatic, Normocephalic  EYES: EOMI, PERRLA, conjunctiva and sclera clear  ENMT: No tonsillar erythema, exudates, or enlargement; Moist mucous membranes, Good dentition, No lesions  NECK: Supple, No JVD, Normal thyroid  NERVOUS SYSTEM:  Alert & Oriented X3, Good concentration; Motor Strength 5/5 B/L upper and lower extremities; DTRs 2+ intact and symmetric  PULM: Clear to auscultation bilaterally  CARDIAC: Regular rate and rhythm; No murmurs, rubs, or gallops  GI: Soft, Nontender, Nondistended; Bowel sounds present  EXTREMITIES:  2+ Peripheral Pulses, No clubbing, cyanosis, or edema  LYMPH: No lymphadenopathy noted  SKIN: No rashes or lesions    Consultant(s) Notes Reviewed:  [x ] YES  [ ] NO  Care Discussed with Consultants/Other Providers [ x] YES  [ ] NO    LABS:                    aluminum hydroxide/magnesium hydroxide/simethicone Suspension 30 milliLiter(s) Oral every 4 hours PRN  apixaban 5 milliGRAM(s) Oral two times a day  atorvastatin 40 milliGRAM(s) Oral at bedtime  chlorhexidine 2% Cloths 1 Application(s) Topical <User Schedule>  furosemide    Tablet 40 milliGRAM(s) Oral daily  iron sucrose IVPB 200 milliGRAM(s) IV Intermittent every 24 hours  levothyroxine 100 MICROGram(s) Oral daily  losartan 50 milliGRAM(s) Oral daily  meclizine 12.5 milliGRAM(s) Oral every 8 hours  melatonin 3 milliGRAM(s) Oral at bedtime PRN  metoprolol tartrate 50 milliGRAM(s) Oral two times a day  ondansetron Injectable 4 milliGRAM(s) IV Push every 8 hours PRN  oxycodone    5 mG/acetaminophen 325 mG 1 Tablet(s) Oral every 6 hours  pantoprazole    Tablet 40 milliGRAM(s) Oral before breakfast  polyethylene glycol 3350 17 Gram(s) Oral two times a day  senna 2 Tablet(s) Oral at bedtime      79Y/F with Pmhx of Obesity, Cataract, Hypothyroid, Vertigo, Insomnia, GERD, HTN, Osteoarthritis, H/O arm surgery, A-fib on Eliquis presented to the ED for the evaluation of shortness of breath and bilateral LE edema. The patient has had LE edema for 2 years. She was having SOB for 2 months on and off,  mostly exertional, worsening for last 3-4 days. 3 month ago, the patient visited her PCP and was diagnosed with Afib, started on Eliquis and Metoprolol. She was planned to get CCTA today AM. For this, she was prescribed Metoprolol tartrate 100mg last night. After the dose, her SOB got worse and therefore she presented to ED today.    1. Dizziness   - not spinning   - pt on home meclizine, resumed   - BP stable   - was able to walk with PT       2. Acute HFpEF resolved   - Mild, SOB, mild leg edema, [id number](was 998 in feb, 2024).  - CXR showed mild pulmonary congestion, cardiomegaly.   - was Lasix 40mg IV BID, switch to Lasix po 40mg daily ( 6/2)  - TTE: NL EF   - CCTA done, results noted . Limited but calcium score is low   - Low sodium diet, fluid restriction.   - remains on RA     3. Chronic Atrial Fibrillation:   - Rate is controlled  - Continue Metoprolol 25mg BID and Eliquis.   - may need increase in metoprolol for rate control prior to CCTA as BP tolerates     4. Microcytic anemia/ iron deficiency anemia:   - No melena or acute bleeding, ferritin 13, Iron 28, Iron sat 7%.   - Started on Venofer IV, patient is on Eliquis, need further monitor outpatient to rule out GI bleeidng    5. HTN:   - Continue Losartan and Metoprolol, Amlodipine held for possible the cause of leg edema.     6. Hypothyroidism: continue with Levothyroxine    7. Constipation  Bowel regimen    8. ?LE OA  -On percocet q6hr prn       #Misc  - DVT Prophylaxis: eliquis   - GI Prophylaxis: protonix   - Activity:as tolerated   - Code Status: full code   - Dispo: Home    [facility]ARA UMANZOR  79[facility]      Patient is a 79y old  Female who presents with a chief complaint of CHF (05 Jun 2024 14:34)      INTERVAL HPI/OVERNIGHT EVENTS:        Patient feels ok. Still reporting dizziness which is chronic and worse with movement  no other events noted         FAMILY HISTORY:    T(C): 36.6 (06-06-24 @ 04:49), Max: 36.7 (06-05-24 @ 12:13)  HR: 90 (06-06-24 @ 04:49) (77 - 90)  BP: 129/85 (06-06-24 @ 04:49) (117/73 - 138/81)  RR: 18 (06-06-24 @ 04:49) (18 - 18)  SpO2: --  Wt(kg): --Vital Signs Last 24 Hrs  T(C): 36.6 (06 Jun 2024 04:49), Max: 36.7 (05 Jun 2024 12:13)  T(F): 97.9 (06 Jun 2024 04:49), Max: 98.1 (05 Jun 2024 20:10)  HR: 90 (06 Jun 2024 04:49) (77 - 90)  BP: 129/85 (06 Jun 2024 04:49) (117/73 - 138/81)  BP(mean): --  RR: 18 (06 Jun 2024 04:49) (18 - 18)  SpO2: --        PHYSICAL EXAM:  GENERAL: NAD, well-groomed, well-developed  EYES: No nystagmuss   NERVOUS SYSTEM:  Alert & Oriented X3  PULM: Clear to auscultation bilaterally  CARDIAC: Regular rate and rhythm;  GI: Soft, Nontender, Nondistended; Bowel sounds present  EXTREMITIES:  2+ Peripheral Pulses,  Consultant(s) Notes Reviewed:  [x ] YES  [ ] NO  Care Discussed with Consultants/Other Providers [ x] YES  [ ] NO    LABS:                    aluminum hydroxide/magnesium hydroxide/simethicone Suspension 30 milliLiter(s) Oral every 4 hours PRN  apixaban 5 milliGRAM(s) Oral two times a day  atorvastatin 40 milliGRAM(s) Oral at bedtime  chlorhexidine 2% Cloths 1 Application(s) Topical <User Schedule>  furosemide    Tablet 40 milliGRAM(s) Oral daily  iron sucrose IVPB 200 milliGRAM(s) IV Intermittent every 24 hours  levothyroxine 100 MICROGram(s) Oral daily  losartan 50 milliGRAM(s) Oral daily  meclizine 12.5 milliGRAM(s) Oral every 8 hours  melatonin 3 milliGRAM(s) Oral at bedtime PRN  metoprolol tartrate 50 milliGRAM(s) Oral two times a day  ondansetron Injectable 4 milliGRAM(s) IV Push every 8 hours PRN  oxycodone    5 mG/acetaminophen 325 mG 1 Tablet(s) Oral every 6 hours  pantoprazole    Tablet 40 milliGRAM(s) Oral before breakfast  polyethylene glycol 3350 17 Gram(s) Oral two times a day  senna 2 Tablet(s) Oral at bedtime      79Y/F with Pmhx of Obesity, Cataract, Hypothyroid, Vertigo, Insomnia, GERD, HTN, Osteoarthritis, H/O arm surgery, A-fib on Eliquis presented to the ED for the evaluation of shortness of breath and bilateral LE edema. The patient has had LE edema for 2 years. She was having SOB for 2 months on and off,  mostly exertional, worsening for last 3-4 days. 3 month ago, the patient visited her PCP and was diagnosed with Afib, started on Eliquis and Metoprolol. She was planned to get CCTA today AM. For this, she was prescribed Metoprolol tartrate 100mg last night. After the dose, her SOB got worse and therefore she presented to ED today.    1. Dizziness   - not spinning   - pt on home meclizine, resumed   - BP stable   - was able to walk with PT       2. Acute HFpEF resolved   - Mild, SOB, mild leg edema, [id number](was 998 in feb, 2024).  - CXR showed mild pulmonary congestion, cardiomegaly.   - was Lasix 40mg IV BID, switch to Lasix po 40mg daily ( 6/2)  - TTE: NL EF   - CCTA done, results noted . Limited but calcium score is low   - Low sodium diet, fluid restriction.   - remains on RA     3. Chronic Atrial Fibrillation:   - Rate is controlled  - Continue Metoprolol 25mg BID and Eliquis.   - may need increase in metoprolol for rate control prior to CCTA as BP tolerates     4. Microcytic anemia/ iron deficiency anemia:   - No melena or acute bleeding, ferritin 13, Iron 28, Iron sat 7%.   - Started on Venofer IV, patient is on Eliquis, need further monitor outpatient to rule out GI bleeidng    5. HTN:   - Continue Losartan and Metoprolol, Amlodipine held for possible the cause of leg edema.     6. Hypothyroidism: continue with Levothyroxine    7. Constipation  Bowel regimen    8. ?LE OA  -On percocet q6hr prn       #Misc  - DVT Prophylaxis: eliquis   - GI Prophylaxis: protonix   - Activity:as tolerated   - Code Status: full code   - Dispo: Home     patient is agreeable with dc

## 2024-06-06 NOTE — PROGRESS NOTE ADULT - PROVIDER SPECIALTY LIST ADULT
Internal Medicine
Hospitalist

## 2024-06-11 DIAGNOSIS — M19.079 PRIMARY OSTEOARTHRITIS, UNSPECIFIED ANKLE AND FOOT: ICD-10-CM

## 2024-06-11 DIAGNOSIS — H26.9 UNSPECIFIED CATARACT: ICD-10-CM

## 2024-06-11 DIAGNOSIS — I48.20 CHRONIC ATRIAL FIBRILLATION, UNSPECIFIED: ICD-10-CM

## 2024-06-11 DIAGNOSIS — G47.00 INSOMNIA, UNSPECIFIED: ICD-10-CM

## 2024-06-11 DIAGNOSIS — D50.9 IRON DEFICIENCY ANEMIA, UNSPECIFIED: ICD-10-CM

## 2024-06-11 DIAGNOSIS — I50.31 ACUTE DIASTOLIC (CONGESTIVE) HEART FAILURE: ICD-10-CM

## 2024-06-11 DIAGNOSIS — E66.9 OBESITY, UNSPECIFIED: ICD-10-CM

## 2024-06-11 DIAGNOSIS — K59.09 OTHER CONSTIPATION: ICD-10-CM

## 2024-06-11 DIAGNOSIS — K21.9 GASTRO-ESOPHAGEAL REFLUX DISEASE WITHOUT ESOPHAGITIS: ICD-10-CM

## 2024-06-11 DIAGNOSIS — I11.0 HYPERTENSIVE HEART DISEASE WITH HEART FAILURE: ICD-10-CM

## 2024-06-11 DIAGNOSIS — E03.9 HYPOTHYROIDISM, UNSPECIFIED: ICD-10-CM

## 2024-06-11 DIAGNOSIS — Z79.01 LONG TERM (CURRENT) USE OF ANTICOAGULANTS: ICD-10-CM

## 2024-07-06 ENCOUNTER — INPATIENT (INPATIENT)
Facility: HOSPITAL | Age: 79
LOS: 6 days | Discharge: HOME HEALTH | DRG: 291 | End: 2024-07-13
Attending: INTERNAL MEDICINE | Admitting: STUDENT IN AN ORGANIZED HEALTH CARE EDUCATION/TRAINING PROGRAM
Payer: MEDICARE

## 2024-07-06 VITALS
OXYGEN SATURATION: 98 % | SYSTOLIC BLOOD PRESSURE: 140 MMHG | HEIGHT: 62 IN | TEMPERATURE: 98 F | WEIGHT: 214.95 LBS | DIASTOLIC BLOOD PRESSURE: 89 MMHG | RESPIRATION RATE: 18 BRPM | HEART RATE: 84 BPM

## 2024-07-06 DIAGNOSIS — Z98.890 OTHER SPECIFIED POSTPROCEDURAL STATES: Chronic | ICD-10-CM

## 2024-07-06 DIAGNOSIS — I48.91 UNSPECIFIED ATRIAL FIBRILLATION: ICD-10-CM

## 2024-07-06 LAB
ALBUMIN SERPL ELPH-MCNC: 4.4 G/DL — SIGNIFICANT CHANGE UP (ref 3.5–5.2)
ALP SERPL-CCNC: 66 U/L — SIGNIFICANT CHANGE UP (ref 30–115)
ALT FLD-CCNC: 10 U/L — SIGNIFICANT CHANGE UP (ref 0–41)
ANION GAP SERPL CALC-SCNC: 16 MMOL/L — HIGH (ref 7–14)
ANION GAP SERPL CALC-SCNC: 16 MMOL/L — HIGH (ref 7–14)
APTT BLD: 43 SEC — HIGH (ref 27–39.2)
AST SERPL-CCNC: 25 U/L — SIGNIFICANT CHANGE UP (ref 0–41)
BASOPHILS # BLD AUTO: 0.07 K/UL — SIGNIFICANT CHANGE UP (ref 0–0.2)
BASOPHILS NFR BLD AUTO: 0.7 % — SIGNIFICANT CHANGE UP (ref 0–1)
BILIRUB SERPL-MCNC: 0.7 MG/DL — SIGNIFICANT CHANGE UP (ref 0.2–1.2)
BUN SERPL-MCNC: 16 MG/DL — SIGNIFICANT CHANGE UP (ref 10–20)
BUN SERPL-MCNC: 17 MG/DL — SIGNIFICANT CHANGE UP (ref 10–20)
CALCIUM SERPL-MCNC: 9.4 MG/DL — SIGNIFICANT CHANGE UP (ref 8.4–10.4)
CALCIUM SERPL-MCNC: 9.5 MG/DL — SIGNIFICANT CHANGE UP (ref 8.4–10.5)
CHLORIDE SERPL-SCNC: 105 MMOL/L — SIGNIFICANT CHANGE UP (ref 98–110)
CHLORIDE SERPL-SCNC: 108 MMOL/L — SIGNIFICANT CHANGE UP (ref 98–110)
CO2 SERPL-SCNC: 20 MMOL/L — SIGNIFICANT CHANGE UP (ref 17–32)
CO2 SERPL-SCNC: 21 MMOL/L — SIGNIFICANT CHANGE UP (ref 17–32)
CREAT SERPL-MCNC: 0.8 MG/DL — SIGNIFICANT CHANGE UP (ref 0.7–1.5)
CREAT SERPL-MCNC: 0.9 MG/DL — SIGNIFICANT CHANGE UP (ref 0.7–1.5)
EGFR: 64 ML/MIN/1.73M2 — SIGNIFICANT CHANGE UP
EGFR: 74 ML/MIN/1.73M2 — SIGNIFICANT CHANGE UP
EOSINOPHIL # BLD AUTO: 0.02 K/UL — SIGNIFICANT CHANGE UP (ref 0–0.7)
EOSINOPHIL NFR BLD AUTO: 0.2 % — SIGNIFICANT CHANGE UP (ref 0–8)
GLUCOSE SERPL-MCNC: 71 MG/DL — SIGNIFICANT CHANGE UP (ref 70–99)
GLUCOSE SERPL-MCNC: 96 MG/DL — SIGNIFICANT CHANGE UP (ref 70–99)
HCT VFR BLD CALC: 42.5 % — SIGNIFICANT CHANGE UP (ref 37–47)
HGB BLD-MCNC: 13.2 G/DL — SIGNIFICANT CHANGE UP (ref 12–16)
IMM GRANULOCYTES NFR BLD AUTO: 0.3 % — SIGNIFICANT CHANGE UP (ref 0.1–0.3)
INR BLD: 2.84 RATIO — HIGH (ref 0.65–1.3)
LYMPHOCYTES # BLD AUTO: 1.82 K/UL — SIGNIFICANT CHANGE UP (ref 1.2–3.4)
LYMPHOCYTES # BLD AUTO: 16.9 % — LOW (ref 20.5–51.1)
MAGNESIUM SERPL-MCNC: 2 MG/DL — SIGNIFICANT CHANGE UP (ref 1.8–2.4)
MAGNESIUM SERPL-MCNC: 2 MG/DL — SIGNIFICANT CHANGE UP (ref 1.8–2.4)
MCHC RBC-ENTMCNC: 25.8 PG — LOW (ref 27–31)
MCHC RBC-ENTMCNC: 31.1 G/DL — LOW (ref 32–37)
MCV RBC AUTO: 83.2 FL — SIGNIFICANT CHANGE UP (ref 81–99)
MONOCYTES # BLD AUTO: 0.61 K/UL — HIGH (ref 0.1–0.6)
MONOCYTES NFR BLD AUTO: 5.7 % — SIGNIFICANT CHANGE UP (ref 1.7–9.3)
NEUTROPHILS # BLD AUTO: 8.2 K/UL — HIGH (ref 1.4–6.5)
NEUTROPHILS NFR BLD AUTO: 76.2 % — HIGH (ref 42.2–75.2)
NRBC # BLD: 0 /100 WBCS — SIGNIFICANT CHANGE UP (ref 0–0)
NT-PROBNP SERPL-SCNC: 4592 PG/ML — HIGH (ref 0–300)
PLATELET # BLD AUTO: 238 K/UL — SIGNIFICANT CHANGE UP (ref 130–400)
PMV BLD: 10.1 FL — SIGNIFICANT CHANGE UP (ref 7.4–10.4)
POTASSIUM SERPL-MCNC: 3.9 MMOL/L — SIGNIFICANT CHANGE UP (ref 3.5–5)
POTASSIUM SERPL-MCNC: 4 MMOL/L — SIGNIFICANT CHANGE UP (ref 3.5–5)
POTASSIUM SERPL-SCNC: 3.9 MMOL/L — SIGNIFICANT CHANGE UP (ref 3.5–5)
POTASSIUM SERPL-SCNC: 4 MMOL/L — SIGNIFICANT CHANGE UP (ref 3.5–5)
PROT SERPL-MCNC: 6.8 G/DL — SIGNIFICANT CHANGE UP (ref 6–8)
PROTHROM AB SERPL-ACNC: 32.7 SEC — HIGH (ref 9.95–12.87)
RBC # BLD: 5.11 M/UL — SIGNIFICANT CHANGE UP (ref 4.2–5.4)
RBC # FLD: 22.5 % — HIGH (ref 11.5–14.5)
SODIUM SERPL-SCNC: 142 MMOL/L — SIGNIFICANT CHANGE UP (ref 135–146)
SODIUM SERPL-SCNC: 144 MMOL/L — SIGNIFICANT CHANGE UP (ref 135–146)
TROPONIN T, HIGH SENSITIVITY RESULT: 20 NG/L — HIGH (ref 6–13)
TROPONIN T, HIGH SENSITIVITY RESULT: 25 NG/L — HIGH (ref 6–13)
WBC # BLD: 10.75 K/UL — SIGNIFICANT CHANGE UP (ref 4.8–10.8)
WBC # FLD AUTO: 10.75 K/UL — SIGNIFICANT CHANGE UP (ref 4.8–10.8)

## 2024-07-06 PROCEDURE — 85027 COMPLETE CBC AUTOMATED: CPT

## 2024-07-06 PROCEDURE — 93010 ELECTROCARDIOGRAM REPORT: CPT

## 2024-07-06 PROCEDURE — 80053 COMPREHEN METABOLIC PANEL: CPT

## 2024-07-06 PROCEDURE — 99222 1ST HOSP IP/OBS MODERATE 55: CPT

## 2024-07-06 PROCEDURE — 97110 THERAPEUTIC EXERCISES: CPT | Mod: GP

## 2024-07-06 PROCEDURE — 84484 ASSAY OF TROPONIN QUANT: CPT

## 2024-07-06 PROCEDURE — 97116 GAIT TRAINING THERAPY: CPT | Mod: GP

## 2024-07-06 PROCEDURE — 99285 EMERGENCY DEPT VISIT HI MDM: CPT

## 2024-07-06 PROCEDURE — 36415 COLL VENOUS BLD VENIPUNCTURE: CPT

## 2024-07-06 PROCEDURE — 82962 GLUCOSE BLOOD TEST: CPT

## 2024-07-06 PROCEDURE — 71045 X-RAY EXAM CHEST 1 VIEW: CPT

## 2024-07-06 PROCEDURE — 71045 X-RAY EXAM CHEST 1 VIEW: CPT | Mod: 26

## 2024-07-06 PROCEDURE — 85025 COMPLETE CBC W/AUTO DIFF WBC: CPT

## 2024-07-06 PROCEDURE — 83735 ASSAY OF MAGNESIUM: CPT

## 2024-07-06 PROCEDURE — 84443 ASSAY THYROID STIM HORMONE: CPT

## 2024-07-06 PROCEDURE — 97161 PT EVAL LOW COMPLEX 20 MIN: CPT | Mod: GP

## 2024-07-06 PROCEDURE — 82728 ASSAY OF FERRITIN: CPT

## 2024-07-06 PROCEDURE — 85610 PROTHROMBIN TIME: CPT

## 2024-07-06 PROCEDURE — 83036 HEMOGLOBIN GLYCOSYLATED A1C: CPT

## 2024-07-06 PROCEDURE — 83540 ASSAY OF IRON: CPT

## 2024-07-06 PROCEDURE — 93307 TTE W/O DOPPLER COMPLETE: CPT

## 2024-07-06 PROCEDURE — 85379 FIBRIN DEGRADATION QUANT: CPT

## 2024-07-06 PROCEDURE — 80061 LIPID PANEL: CPT

## 2024-07-06 PROCEDURE — 83550 IRON BINDING TEST: CPT

## 2024-07-06 PROCEDURE — 80048 BASIC METABOLIC PNL TOTAL CA: CPT

## 2024-07-06 RX ORDER — MAGNESIUM, ALUMINUM HYDROXIDE 400-400
30 TABLET,CHEWABLE ORAL EVERY 4 HOURS
Refills: 0 | Status: DISCONTINUED | OUTPATIENT
Start: 2024-07-06 | End: 2024-07-13

## 2024-07-06 RX ORDER — FUROSEMIDE 10 MG/ML
40 INJECTION, SOLUTION INTRAMUSCULAR; INTRAVENOUS DAILY
Refills: 0 | Status: DISCONTINUED | OUTPATIENT
Start: 2024-07-06 | End: 2024-07-08

## 2024-07-06 RX ORDER — LOSARTAN POTASSIUM 100 MG/1
50 TABLET, FILM COATED ORAL DAILY
Refills: 0 | Status: DISCONTINUED | OUTPATIENT
Start: 2024-07-06 | End: 2024-07-08

## 2024-07-06 RX ORDER — ATORVASTATIN CALCIUM 20 MG/1
20 TABLET, FILM COATED ORAL AT BEDTIME
Refills: 0 | Status: DISCONTINUED | OUTPATIENT
Start: 2024-07-06 | End: 2024-07-13

## 2024-07-06 RX ORDER — ACETAMINOPHEN 325 MG
650 TABLET ORAL EVERY 6 HOURS
Refills: 0 | Status: DISCONTINUED | OUTPATIENT
Start: 2024-07-06 | End: 2024-07-13

## 2024-07-06 RX ORDER — METOPROLOL TARTRATE 50 MG
50 TABLET ORAL
Refills: 0 | Status: DISCONTINUED | OUTPATIENT
Start: 2024-07-06 | End: 2024-07-09

## 2024-07-06 RX ORDER — MECLIZINE HCL 25 MG
12.5 TABLET ORAL THREE TIMES A DAY
Refills: 0 | Status: DISCONTINUED | OUTPATIENT
Start: 2024-07-06 | End: 2024-07-10

## 2024-07-06 RX ORDER — LEVOTHYROXINE SODIUM 25 MCG
100 TABLET ORAL DAILY
Refills: 0 | Status: DISCONTINUED | OUTPATIENT
Start: 2024-07-06 | End: 2024-07-08

## 2024-07-06 RX ORDER — LOSARTAN POTASSIUM 100 MG/1
25 TABLET, FILM COATED ORAL ONCE
Refills: 0 | Status: COMPLETED | OUTPATIENT
Start: 2024-07-06 | End: 2024-07-06

## 2024-07-06 RX ORDER — APIXABAN 5 MG/1
5 TABLET, FILM COATED ORAL EVERY 12 HOURS
Refills: 0 | Status: DISCONTINUED | OUTPATIENT
Start: 2024-07-06 | End: 2024-07-13

## 2024-07-06 RX ORDER — ONDANSETRON HYDROCHLORIDE 2 MG/ML
4 INJECTION INTRAMUSCULAR; INTRAVENOUS EVERY 8 HOURS
Refills: 0 | Status: DISCONTINUED | OUTPATIENT
Start: 2024-07-06 | End: 2024-07-13

## 2024-07-06 RX ORDER — PANTOPRAZOLE SODIUM 40 MG/10ML
40 INJECTION, POWDER, FOR SOLUTION INTRAVENOUS
Refills: 0 | Status: DISCONTINUED | OUTPATIENT
Start: 2024-07-06 | End: 2024-07-13

## 2024-07-06 RX ADMIN — APIXABAN 5 MILLIGRAM(S): 5 TABLET, FILM COATED ORAL at 17:38

## 2024-07-06 RX ADMIN — Medication 3 MILLIGRAM(S): at 21:11

## 2024-07-06 RX ADMIN — FUROSEMIDE 40 MILLIGRAM(S): 10 INJECTION, SOLUTION INTRAMUSCULAR; INTRAVENOUS at 10:26

## 2024-07-06 RX ADMIN — LOSARTAN POTASSIUM 25 MILLIGRAM(S): 100 TABLET, FILM COATED ORAL at 21:53

## 2024-07-06 RX ADMIN — ATORVASTATIN CALCIUM 20 MILLIGRAM(S): 20 TABLET, FILM COATED ORAL at 21:11

## 2024-07-06 RX ADMIN — Medication 650 MILLIGRAM(S): at 15:03

## 2024-07-06 RX ADMIN — Medication 50 MILLIGRAM(S): at 17:38

## 2024-07-06 NOTE — H&P ADULT - HISTORY OF PRESENT ILLNESS
79-year-old female history of obesity, cataracts, hypothyroidism, vertigo, insomnia, GERD, hypertension, osteoarthritis, HFpEF mod TR A-fib on Eliquis, aortic stenosis, pulmonary hypertension, recent admission for shortness of breath and lower extremity edema at that time had a CCTA with a CAD RADS of 1, recently told to increase her Lasix from 40 mg once a day to 40 mg twice a day presents to the ED accompanied by son complaining of shortness of breath worse of the past 3 to 4 days.  Positive orthopnea.  Mildly increased lower extremity edema.  Last night felt dizzy and lightheaded.  When they called the ambulance today, was noted to be in A-fib RVR which resolved after 25 mg push of Cardizem.  Patient's dizziness has improved, but continues to endorse shortness of breath.  No fevers or chills, no cough or congestion, no nausea or vomiting or diarrhea, no bleeding events, no urinary symptoms.    Pro-Brain Natriuretic Peptide: 4592 pg/mL (07.06.24 @ 09:10)   Troponin T, High Sensitivity Result: 20:  79-year-old female history of obesity, cataracts, hypothyroidism, vertigo, insomnia, GERD, hypertension, osteoarthritis, HFpEF mod TR A-fib on Eliquis, aortic stenosis, pulmonary hypertension, recent admission for shortness of breath and lower extremity edema at that time had a CCTA with a CAD RADS of 1, recently told to increase her Lasix from 40 mg once a day to 40 mg twice a day presents to the ED accompanied by son complaining of shortness of breath worse of the past 3 to 4 days.  Positive orthopnea.  Mildly increased lower extremity edema.  Last night felt dizzy and lightheaded.  When they called the ambulance today, was noted to be in A-fib RVR which resolved after 25 mg push of Cardizem.  Patient's dizziness has improved, but continues to endorse shortness of breath.  No fevers or chills, no cough or congestion, no nausea or vomiting or diarrhea, no bleeding events, no urinary symptoms.    Pro-Brain Natriuretic Peptide: 4592 pg/mL (07.06.24 @ 09:10)   Troponin T, High Sensitivity Result: 20:     SP IV 40mg lasix

## 2024-07-06 NOTE — ED ADULT NURSE NOTE - [facility], MLM
Physical Therapy Visit    Visit Type: Daily Treatment Note  Visit: 7  Referring Provider: Nicholas Leslie MD  Next referring provider visit: 2/1/2024  Medical Diagnosis (from order): Z09 - Surgical follow-up care     Diagnosis Precautions: - Date of surgery: 12/18/2023 (6 weeks post op as of 1/29/2024)  - Procedure: Left Shoulder athroscopy with SLAP repair and biceps tenotomy      SUBJECTIVE                                                                                                               Patient denies experiencing left shoulder pain at rest.    Functional Change: Patient describes experiencing improvement with active movement of his left arm during his daily and work-related activities.    Patient states he has not been wearing a post-op sling.    Patient states he has not been performing home ice or heat treatments.    Patient states he has been performing his home exercise program.    Patient is a  with the [employer].    Patient was previously active with working out and playing pickleball.      Pain / Symptoms  - Pain rating (out of 10): Current: 0       OBJECTIVE                                                                                                                    Observation   Patient presents to physical therapy without a post-op sling    Range of Motion (ROM)   (degrees unless noted; active unless noted; norms in ( ); negative=lacking to 0, positive=beyond 0)  Shoulder:    - Flexion (180):         Left:   Passive: 138    - Abduction (180):         Left:   Passive: 90    - Internal Rotation:        - at 90° (70-90):             Left:   Passive: 25    - External Rotation:       - at 90° (90):             Left: Passive: 35                       Treatment     Therapeutic Exercise  Standing shoulder circumduction with red physioball on table  -1 x 20 - each direction    Standing shoulder flexion stretch with red physioball on table  -1 x 10 - 5-10 sec  hold  -reviewed countertop stretch for HEP    Supine shoulder abduction with dowel  -1 x 10 - 2-3 sec hold  -reviewed standing position for HEP    Supine press   -2 x 10 - 0#    Supine overhead flexion  -2 x 10 - 0#    Prone row  -2 x 10 - 0#    Prone extension  -2 x 10 - 0#    Standing submaximal isometric shoulder abduction at wall  -1 x 10 - 5-10 sec hold    Standing submaximal isometric shoulder internal rotation at wall  -1 x 10 - 5-10 sec hold    Standing submaximal isometric shoulder external rotation at wall  -1 x 10 - 5-10 sec hold      Manual Therapy   Manual shoulder passive range of motion  -flexion - gentle overpressure  -abduction - gentle overpressure  -internal rotation @ various angles - gentle overpressure  -external rotation @ various angles - gentle overpressure    Manual glenohumeral joint mobilization  -inferior glides - grade 2-3  -posterior glides - grade 2-3      Skilled input: tactile instruction/cues, posture correction, verbal instruction/cues, demonstration and facilitation  education/instruction on: reinforced importance of continuing consistent peformance of his home exercise program, reinforced importance of gradual progression for resuming his prior functional level for daily, work, recreation/leisure, fitness/wellness activities  instruction/cues for: proper form, muscle activation, pacing with exercises    Writer verbally educated and received verbal consent for hand placement, positioning of patient, and techniques to be performed today from patient for therapist position for techniques and hand placement and palpation for techniques as described above and how they are pertinent to the patient's plan of care.  Home Exercise Program  Access Code: [email or web address]  URL: [email or web address]  Date: 01/24/2024  Prepared by: Roosevelt Avila    Exercises  - Seated Shoulder Flexion Towel Slide at Table Top  - 1 x daily - 7 x weekly - 3 sets - 10 reps  -  Flexion-Extension Shoulder Pendulum with Table Support  - 1 x daily - 7 x weekly - 3 sets - 10 reps  - Standing Isometric Shoulder External Rotation with Doorway  - 1 x daily - 7 x weekly - 3 sets - 10 reps  - Standing Isometric Shoulder Internal Rotation at Doorway  - 1 x daily - 7 x weekly - 3 sets - 10 reps  - Supine Shoulder Flexion with Dowel  - 3 x daily - 7 x weekly - 2 sets - 10 reps  - Supine Shoulder External Rotation with Dowel  - 3 x daily - 7 x weekly - 2 sets - 10 reps  - Forearm Pronation with Dumbbell  - 2 x daily - 7 x weekly - 3 sets - 10 reps  - Wrist Flexion with Resistance  - 2 x daily - 7 x weekly - 3 sets - 10 reps  - Supine Scapular Protraction in Flexion with Dumbbells  - 2 x daily - 7 x weekly - 2 sets - 10 reps  - Prone Shoulder Row  - 1 x daily - 7 x weekly - 1 sets - 10 reps  - Prone Shoulder Extension - Single Arm  - 1 x daily - 7 x weekly - 1 sets - 10 reps  - Sidelying Shoulder External Rotation  - 1 x daily - 7 x weekly - 1 sets - 10 reps      ASSESSMENT                                                                                                            Patient completed all activities included during today's physical therapy session with minimal increase in left shoulder pain or symptoms. Patient demonstrated improvement in shoulder range of motion following manual stretching and mobilization.  Specifically, patient demonstrated 138° flexion, 90° abduction, 25° internal rotation, and 35° external rotation range of motion. Manual therapy interventions were reinforced with self-stretching, range of motion, and isometric strengthening exercises.  Patient required therapist instruction for performing exercises with proper form, muscle activation, and pacing.  Patient's home exercise program was reviewed during today's physical therapy session.    Education:   - Results of above outlined education: Verbalizes understanding and Demonstrates understanding    PLAN                                                                                                                            Suggestions for next session as indicated: Progress per plan of care:    - Continue to work on manual mobilization and stretching for ROM  - Scapular stabilizer strengthening in closed and open chain       Therapy procedure time and total treatment time can be found documented on the Time Entry flowsheet     Patent

## 2024-07-06 NOTE — H&P ADULT - NSHPPHYSICALEXAM_GEN_ALL_CORE
CONSTITUTIONAL: No acute distress,  HEAD: Atraumatic, normocephalic  EYES: EOM intact, PERRLA, conjunctiva and sclera clear  ENT: Supple, no masses, no thyromegaly, no bruits, no JVD; moist mucous membranes  PULMONARY: Clear to auscultation bilaterally; no wheezes, rales, or rhonchi  CARDIOVASCULAR: Regular rate and rhythm; no murmurs, rubs, or gallops  GASTROINTESTINAL: Soft, non-tender, non-distended; bowel sounds present  MUSCULOSKELETAL: 2+ peripheral pulses; no clubbing, no cyanosis, radial and pedal pulses intact no edema  NEUROLOGY: AAOx 3, moves all exts, non-focal  SKIN: No rashes or lesions; warm and dry, mild redness over the fingers and roes,

## 2024-07-06 NOTE — ED PROVIDER NOTE - WHICH SHOWED
EKG independently interpreted by myself as rate controlled A-fib, rate of 78, right axis deviation, QRS 80, QTc 394 Q-wave inversions in 2 3 and aVF and V4 V5 V6 which are unchanged compared to prior in May 2024

## 2024-07-06 NOTE — H&P ADULT - ATTENDING COMMENTS
79Y/F with HFpEF, chronic Afib, Obesity, Hypothyroid, Vertigo, GERD, HTN, Osteoarthritis,  presented to the ED for the evaluation of shortness of breath    #acute HFpEF  #chronic Afib on Eliquis  #HTN/ Hypothyroid,     PLANs:    - recent hospitlization for Acute HF, med compliance ?  - on RA, BNP 4000 > 2000 last month   - IV lasix 40mg qd  - daily weight, strict i/os, DASH diet   - c/w eliquis and home meds  - trend trop one more time, if stable DC tele no 79Y/F with HFpEF, chronic Afib, Obesity, Hypothyroid, Vertigo, GERD, HTN, Osteoarthritis,  presented to the ED for the evaluation   orthopnea and of shortness of breath    #orthopnea and SOB could be from subacute HFpEF  #chronic Afib on Eliquis  #HTN/ Hypothyroid,     PLANs:      #orthopnea and SOB could be from subacute HFpEF  - on RA, no congestion on CXR and no LE edema, could be subacute HF from elevated BP, BNP 4000 > 2000 last month   - c/w IV lasix 40mg qd, if remain stable expected to DC in 24 hr   - daily weight, strict i/os, DASH diet   - c/w eliquis and home meds  - trend trop one more time, if stable DC tele  - redness of finger and toes rené due to minor dermal hemorrhage from eliquis,   - full code 79Y/F with HFpEF, chronic Afib, Obesity, Hypothyroid, Vertigo, GERD, HTN, Osteoarthritis,  presented to the ED for the evaluation   orthopnea and of shortness of breath    #orthopnea and SOB could be from subacute HFpEF  #chronic Afib on Eliquis  #HTN/ Hypothyroid,     PLANs:      #orthopnea and SOB could be from subacute HFpEF  - on RA, no congestion on CXR and no LE edema, could be subacute HF from elevated BP, VS Afib w RVR per EMS was given cardizem?  -  BNP 4000 > 2000 last month   - c/w IV lasix 40mg qd, if remain stable expected to DC in 24 hr   - daily weight, strict i/os, DASH diet   - c/w eliquis and home meds  - trend trop one more time, if stable DC tele  - redness of finger and toes likley due to minor dermal hemorrhage from eliquis,   - full code

## 2024-07-06 NOTE — H&P ADULT - MLM HIDDEN
-- DO NOT REPLY / DO NOT REPLY ALL --  -- This inbox is not monitored  -- Message is from Engagement Center Operations (ECO) --      Message Type:  Refill Medication   Refill request for Pended medication named:   Preferred pharmacy verified, and selected.   [id number] - [place], [place] - [address] AT [address]    Is the patient OUT of Medication?  No        Message:                    yes

## 2024-07-06 NOTE — ED PROVIDER NOTE - CLINICAL SUMMARY MEDICAL DECISION MAKING FREE TEXT BOX
79-year-old female history of obesity, cataracts, hypothyroidism, vertigo, insomnia, GERD, hypertension, osteoarthritis, A-fib on Eliquis, CHF with tricuspid and mitral regurgitation, aortic stenosis, pulmonary hypertension, recent admission for shortness of breath and lower extremity edema at that time had a CCTA with a CAD RADS of 1, recently told to increase her Lasix from 40 mg once a day to 40 mg twice a day presents to the ED accompanied by son complaining of shortness of breath worse of the past 3 to 4 days.  Positive orthopnea.  Mildly increased lower extremity edema.  Last night felt dizzy and lightheaded.  When they called the ambulance today, was noted to be in A-fib RVR which resolved after 25 mg push of Cardizem.  Patient's dizziness has improved, but continues to endorse shortness of breath.  No fevers or chills, no cough or congestion, no nausea or vomiting or diarrhea, no bleeding events, no urinary symptoms.    On exam she has stable vitals.  No acute distress.  Mild crackles bilateral bases.  Heart irregularly irregular.  Abdomen soft and nontender.  Trace pedal edema.  No calf tenderness.  Extremities warm and well-perfused.    Differential includes CHF, pulmonary hypertension, pneumonia, doubt pneumothorax, unlikely to be ACS given recent CCTA.    Chest x-ray looks stable.  EKG is now rate controlled A-fib.  Plan to admit for diuresis

## 2024-07-06 NOTE — ED PROVIDER NOTE - PHYSICAL EXAMINATION
VITAL SIGNS: I have reviewed nursing notes and confirm.  CONSTITUTIONAL: Patient is in no acute distress.  SKIN: Skin exam is warm and dry, no acute rash.  HEAD: Normocephalic; atraumatic.  EYES: PERRL, EOM intact; conjunctiva and sclera clear.  ENT: No nasal discharge; airway clear.   NECK: Supple; non tender.  CARD: S1, S2 normal; no murmurs, gallops, or rubs. Regular rate and rhythm.  RESP: Clear to auscultation bilaterally. No wheezes, rales or rhonchi.  ABD: Normal bowel sounds; soft; non-distended; non-tender.   EXT: Normal ROM. Mild LE edema.   LYMPH: No acute cervical adenopathy.  NEURO: Alert, oriented. Grossly unremarkable. No focal deficits.  PSYCH: Cooperative, appropriate.

## 2024-07-06 NOTE — H&P ADULT - CONVERSATION DETAILS
kayla pt and the son Keenan Leiva the [facility], they want full code, but to hold for prolong aggressive measures   and long ventilation incase of irreversible conditions

## 2024-07-06 NOTE — ED PROVIDER NOTE - STUDIES
Monitor. EKG - see results section for interpretation/Xray Image(s) - see wet read section for interpretation

## 2024-07-06 NOTE — H&P ADULT - NSHPLABSRESULTS_GEN_ALL_CORE
< from: TTE Echo Complete w/o Contrast w/ Doppler (05.31.24 @ 15:39) >    Summary:   1. Low-normal global left ventricular systolic function with ejection   fraction, by visual estimation, of 50 to 55%. The left ventricular   diastolic function could not be assessed in this study.   2. Moderately enlarged right ventricle with grossly normal systolic   function.   3. Moderate biatrial dilatation.   4. Sclerotic aortic valve with decreased opening.   5. Mild mitral valve regurgitation.   6. At least moderate tricuspid regurgitation.   7. Estimated pulmonary artery systolic pressure is 46.1 mmHg assuming a   right atrial pressure of 15 mmHg, which is consistent with mild pulmonary   hypertension.   8. Dilatation of the ascending aorta (3.87cm, 1.88cm/m2).      < end of copied text >

## 2024-07-06 NOTE — ED PROVIDER NOTE - OBJECTIVE STATEMENT
80 yo F with pmhx of hypothyroidism, HTN, CHF, aifibb on eliquis presenting for evaluation for evaluation of shortness of breath x 3-4 days associated with lightheadedness, orthopnea and bilateral lower extremity edema. No chest pain. Was found to bed in aifbb by ems and was given 25mg of cardizem.

## 2024-07-06 NOTE — ED ADULT TRIAGE NOTE - CHIEF COMPLAINT QUOTE
Pt c/o SOB today. As per EMS, pt in rapid Afib on scene (history of), 25mg Cardizem given by EMS PTA. Pt c/o SOB today. As per EMS, pt was in rapid Afib PTA (history of), 25mg Cardizem given by EMS.

## 2024-07-06 NOTE — H&P ADULT - ASSESSMENT
79Y/F with HFpEF, chronic Afib, Obesity, Hypothyroid, Vertigo, GERD, HTN, Osteoarthritis,  presented to the ED for the evaluation   orthopnea and of shortness of breath    #ADHF WW pEF 2ry to Afib  - CXR increased BV markings  - Pro-Brain Natriuretic Peptide: 4592 pg/mL (07.06.24 @ 09:10), Troponin T, High Sensitivity Result: 20:   - SP Cardizem by EMR  - currently Afib is controlled symptoms controlled  - SP IV 40mg IVP Lasix, cw daily LVP lasix 40mg      #chronic Afib on Eliquis    #HTN     Hypothyroid, 79Y/F with HFpEF, chronic Afib, Obesity, Hypothyroid, Vertigo, GERD, HTN, Osteoarthritis,  presented to the ED for the evaluation   orthopnea and of shortness of breath    #ADHF WW pEF 2ry to Afib  - CXR increased BV markings  - Pro-Brain Natriuretic Peptide: 4592 pg/mL (07.06.24 @ 09:10), Troponin T, High Sensitivity Result: 20:   - SP Cardizem by EMR  - currently Afib is controlled symptoms controlled  - SP IV 40mg IVP Lasix, cw daily LVP lasix 40mg  - increased metoprolol to 50mg PO bid from 25  - FU TSH Iron profile  - Pending echo    #chronic Afib on Eliquis  - CHADsVaSc 3   - Eliquis 5mg bid  - metoprolol 50mg bid    #HTN  - cw losartan   - inc metoprolol to 50 bid    #Hypothyroid,  - Follow AM TSH  - KEep home dose    GI panto  DVT Elqiuis  Full code

## 2024-07-07 LAB
ALBUMIN SERPL ELPH-MCNC: 4.4 G/DL — SIGNIFICANT CHANGE UP (ref 3.5–5.2)
ALP SERPL-CCNC: 64 U/L — SIGNIFICANT CHANGE UP (ref 30–115)
ALT FLD-CCNC: 10 U/L — SIGNIFICANT CHANGE UP (ref 0–41)
ANION GAP SERPL CALC-SCNC: 12 MMOL/L — SIGNIFICANT CHANGE UP (ref 7–14)
AST SERPL-CCNC: 17 U/L — SIGNIFICANT CHANGE UP (ref 0–41)
BASOPHILS # BLD AUTO: 0.09 K/UL — SIGNIFICANT CHANGE UP (ref 0–0.2)
BASOPHILS NFR BLD AUTO: 0.9 % — SIGNIFICANT CHANGE UP (ref 0–1)
BILIRUB SERPL-MCNC: 0.7 MG/DL — SIGNIFICANT CHANGE UP (ref 0.2–1.2)
BUN SERPL-MCNC: 15 MG/DL — SIGNIFICANT CHANGE UP (ref 10–20)
CALCIUM SERPL-MCNC: 9.1 MG/DL — SIGNIFICANT CHANGE UP (ref 8.4–10.5)
CHLORIDE SERPL-SCNC: 103 MMOL/L — SIGNIFICANT CHANGE UP (ref 98–110)
CHOLEST SERPL-MCNC: 102 MG/DL — SIGNIFICANT CHANGE UP
CO2 SERPL-SCNC: 24 MMOL/L — SIGNIFICANT CHANGE UP (ref 17–32)
CREAT SERPL-MCNC: 0.8 MG/DL — SIGNIFICANT CHANGE UP (ref 0.7–1.5)
EGFR: 74 ML/MIN/1.73M2 — SIGNIFICANT CHANGE UP
EOSINOPHIL # BLD AUTO: 0.16 K/UL — SIGNIFICANT CHANGE UP (ref 0–0.7)
EOSINOPHIL NFR BLD AUTO: 1.6 % — SIGNIFICANT CHANGE UP (ref 0–8)
GLUCOSE BLDC GLUCOMTR-MCNC: 118 MG/DL — HIGH (ref 70–99)
GLUCOSE SERPL-MCNC: 101 MG/DL — HIGH (ref 70–99)
HCT VFR BLD CALC: 41.6 % — SIGNIFICANT CHANGE UP (ref 37–47)
HDLC SERPL-MCNC: 33 MG/DL — LOW
HGB BLD-MCNC: 12.7 G/DL — SIGNIFICANT CHANGE UP (ref 12–16)
IMM GRANULOCYTES NFR BLD AUTO: 0.3 % — SIGNIFICANT CHANGE UP (ref 0.1–0.3)
INR BLD: 2.8 RATIO — HIGH (ref 0.65–1.3)
IRON SATN MFR SERPL: 10 % — LOW (ref 15–50)
IRON SATN MFR SERPL: 33 UG/DL — LOW (ref 35–150)
LIPID PNL WITH DIRECT LDL SERPL: 50 MG/DL — SIGNIFICANT CHANGE UP
LYMPHOCYTES # BLD AUTO: 2.4 K/UL — SIGNIFICANT CHANGE UP (ref 1.2–3.4)
LYMPHOCYTES # BLD AUTO: 24.5 % — SIGNIFICANT CHANGE UP (ref 20.5–51.1)
MAGNESIUM SERPL-MCNC: 1.9 MG/DL — SIGNIFICANT CHANGE UP (ref 1.8–2.4)
MCHC RBC-ENTMCNC: 25.5 PG — LOW (ref 27–31)
MCHC RBC-ENTMCNC: 30.5 G/DL — LOW (ref 32–37)
MCV RBC AUTO: 83.5 FL — SIGNIFICANT CHANGE UP (ref 81–99)
MONOCYTES # BLD AUTO: 0.61 K/UL — HIGH (ref 0.1–0.6)
MONOCYTES NFR BLD AUTO: 6.2 % — SIGNIFICANT CHANGE UP (ref 1.7–9.3)
NEUTROPHILS # BLD AUTO: 6.49 K/UL — SIGNIFICANT CHANGE UP (ref 1.4–6.5)
NEUTROPHILS NFR BLD AUTO: 66.5 % — SIGNIFICANT CHANGE UP (ref 42.2–75.2)
NON HDL CHOLESTEROL: 69 MG/DL — SIGNIFICANT CHANGE UP
NRBC # BLD: 0 /100 WBCS — SIGNIFICANT CHANGE UP (ref 0–0)
PLATELET # BLD AUTO: 247 K/UL — SIGNIFICANT CHANGE UP (ref 130–400)
PMV BLD: 10.4 FL — SIGNIFICANT CHANGE UP (ref 7.4–10.4)
POTASSIUM SERPL-MCNC: 3.4 MMOL/L — LOW (ref 3.5–5)
POTASSIUM SERPL-SCNC: 3.4 MMOL/L — LOW (ref 3.5–5)
PROT SERPL-MCNC: 6.7 G/DL — SIGNIFICANT CHANGE UP (ref 6–8)
PROTHROM AB SERPL-ACNC: 32.2 SEC — HIGH (ref 9.95–12.87)
RBC # BLD: 4.98 M/UL — SIGNIFICANT CHANGE UP (ref 4.2–5.4)
RBC # FLD: 22.6 % — HIGH (ref 11.5–14.5)
SODIUM SERPL-SCNC: 139 MMOL/L — SIGNIFICANT CHANGE UP (ref 135–146)
TIBC SERPL-MCNC: 347 UG/DL — SIGNIFICANT CHANGE UP (ref 220–430)
TRIGL SERPL-MCNC: 99 MG/DL — SIGNIFICANT CHANGE UP
TROPONIN T, HIGH SENSITIVITY RESULT: 34 NG/L — HIGH (ref 6–13)
UIBC SERPL-MCNC: 314 UG/DL — SIGNIFICANT CHANGE UP (ref 110–370)
WBC # BLD: 9.78 K/UL — SIGNIFICANT CHANGE UP (ref 4.8–10.8)
WBC # FLD AUTO: 9.78 K/UL — SIGNIFICANT CHANGE UP (ref 4.8–10.8)

## 2024-07-07 PROCEDURE — 93320 DOPPLER ECHO COMPLETE: CPT | Mod: 26

## 2024-07-07 PROCEDURE — 93312 ECHO TRANSESOPHAGEAL: CPT | Mod: 26

## 2024-07-07 PROCEDURE — 99233 SBSQ HOSP IP/OBS HIGH 50: CPT

## 2024-07-07 PROCEDURE — 93325 DOPPLER ECHO COLOR FLOW MAPG: CPT | Mod: 26

## 2024-07-07 RX ADMIN — Medication 50 MILLIGRAM(S): at 05:24

## 2024-07-07 RX ADMIN — Medication 100 MICROGRAM(S): at 05:24

## 2024-07-07 RX ADMIN — PANTOPRAZOLE SODIUM 40 MILLIGRAM(S): 40 INJECTION, POWDER, FOR SOLUTION INTRAVENOUS at 05:24

## 2024-07-07 RX ADMIN — APIXABAN 5 MILLIGRAM(S): 5 TABLET, FILM COATED ORAL at 17:10

## 2024-07-07 RX ADMIN — FUROSEMIDE 40 MILLIGRAM(S): 10 INJECTION, SOLUTION INTRAMUSCULAR; INTRAVENOUS at 05:24

## 2024-07-07 RX ADMIN — Medication 650 MILLIGRAM(S): at 17:13

## 2024-07-07 RX ADMIN — LOSARTAN POTASSIUM 50 MILLIGRAM(S): 100 TABLET, FILM COATED ORAL at 05:24

## 2024-07-07 RX ADMIN — Medication 50 MILLIGRAM(S): at 17:10

## 2024-07-07 RX ADMIN — ATORVASTATIN CALCIUM 20 MILLIGRAM(S): 20 TABLET, FILM COATED ORAL at 21:38

## 2024-07-07 RX ADMIN — APIXABAN 5 MILLIGRAM(S): 5 TABLET, FILM COATED ORAL at 05:23

## 2024-07-07 NOTE — PHYSICAL THERAPY INITIAL EVALUATION ADULT - PERTINENT HX OF CURRENT PROBLEM, REHAB EVAL
79-year-old female history of obesity, cataracts, hypothyroidism, vertigo, insomnia, GERD, hypertension, osteoarthritis, HFpEF mod TR A-fib on Eliquis, aortic stenosis, pulmonary hypertension, recent admission for shortness of breath and lower extremity edema at that time had a CCTA with a CAD RADS of 1, recently told to increase her Lasix from 40 mg once a day to 40 mg twice a day presents to the ED accompanied by son complaining of shortness of breath worse of the past 3 to 4 days.  Positive orthopnea.  Mildly increased lower extremity edema.  Last night felt dizzy and lightheaded.  When they called the ambulance today, was noted to be in A-fib RVR which resolved after 25 mg push of Cardizem.  Patient's dizziness has improved, but continues to endorse shortness of breath.  No fevers or chills, no cough or congestion, no nausea or vomiting or diarrhea, no bleeding events, no urinary symptoms.

## 2024-07-07 NOTE — PROGRESS NOTE ADULT - SUBJECTIVE AND OBJECTIVE BOX
SUBJECTIVE/OVERNIGHT EVENTS  Today is hospital day 1d. This morning patient was seen and examined at bedside, resting comfortably in bed. Overnight patient tachy to 160/106, gave losartan 25 (home med) at 9:20pm.  HOSPITAL COURSE  Day 1:   Day 2:   Day 3:     CODE STATUS:    FAMILY COMMUNICATION  Contact date:  Name of person contacted:  Relationship to patient:  Communication details:    MEDICATIONS  STANDING MEDICATIONS  apixaban 5 milliGRAM(s) Oral every 12 hours  atorvastatin 20 milliGRAM(s) Oral at bedtime  furosemide   Injectable 40 milliGRAM(s) IV Push daily  levothyroxine 100 MICROGram(s) Oral daily  losartan 50 milliGRAM(s) Oral daily  metoprolol tartrate 50 milliGRAM(s) Oral two times a day  pantoprazole    Tablet 40 milliGRAM(s) Oral before breakfast    PRN MEDICATIONS  acetaminophen     Tablet .. 650 milliGRAM(s) Oral every 6 hours PRN  aluminum hydroxide/magnesium hydroxide/simethicone Suspension 30 milliLiter(s) Oral every 4 hours PRN  meclizine 12.5 milliGRAM(s) Oral three times a day PRN  melatonin 3 milliGRAM(s) Oral at bedtime PRN  ondansetron Injectable 4 milliGRAM(s) IV Push every 8 hours PRN  oxycodone    5 mG/acetaminophen 325 mG 1 Tablet(s) Oral every 6 hours PRN    VITALS  T(F): 97.5 (07-07-24 @ 05:05), Max: 98 (07-06-24 @ 07:56)  HR: 82 (07-07-24 @ 05:05) (68 - 87)  BP: 160/96 (07-07-24 @ 05:05) (140/89 - 171/94)  RR: 18 (07-07-24 @ 05:05) (18 - 20)  SpO2: 97% (07-07-24 @ 05:05) (97% - 98%)    PHYSICAL EXAM  CONSTITUTIONAL: No acute distress,  HEAD: Atraumatic, normocephalic  EYES: EOM intact, PERRLA, conjunctiva and sclera clear  ENT: Supple, no masses, no thyromegaly, no bruits, no JVD; moist mucous membranes  PULMONARY: Clear to auscultation bilaterally; no wheezes, rales, or rhonchi  CARDIOVASCULAR: Regular rate and rhythm; no murmurs, rubs, or gallops  GASTROINTESTINAL: Soft, non-tender, non-distended; bowel sounds present  MUSCULOSKELETAL: 2+ peripheral pulses; no clubbing, no cyanosis, radial and pedal pulses intact no edema  NEUROLOGY: AAOx 3, moves all exts, non-focal  SKIN: No rashes or lesions; warm and dry, mild redness over the fingers and roes,      LABS             13.2   10.75 )-----------( 238      ( 07-06-24 @ 09:10 )             42.5     142  |  105  |  16  -------------------------<  71   07-06-24 @ 16:11  3.9  |  21  |  0.8    Ca      9.4     07-06-24 @ 16:11  Mg     2.0     07-06-24 @ 16:11    TPro  6.8  /  Alb  4.4  /  TBili  0.7  /  DBili  x   /  AST  25  /  ALT  10  /  AlkPhos  66  /  GGT  x     07-06-24 @ 09:10    PT/INR - ( 07-06-24 @ 09:10 )   PT: 32.70 sec<H>;   INR: 2.84 ratio<H>  PTT - ( 07-06-24 @ 09:10 )  PTT:43.0 sec    Troponin T, High Sensitivity Result: 25 ng/L (07-06-24 @ 16:11)  Troponin T, High Sensitivity Result: 20 ng/L (07-06-24 @ 09:10)  Pro-Brain Natriuretic Peptide: 4592 pg/mL (07-06-24 @ 09:10)    Urinalysis Basic - ( 06 Jul 2024 16:11 )    Color: x / Appearance: x / SG: x / pH: x  Gluc: 71 mg/dL / Ketone: x  / Bili: x / Urobili: x   Blood: x / Protein: x / Nitrite: x   Leuk Esterase: x / RBC: x / WBC x   Sq Epi: x / Non Sq Epi: x / Bacteria: x          IMAGING

## 2024-07-07 NOTE — PHYSICAL THERAPY INITIAL EVALUATION ADULT - GENERAL OBSERVATIONS, REHAB EVAL
Pt. encountered alert and NAD, supine in bed (+)O2 NC 2L (+)primafit (+)tele, agreeable to PT IE. Pt.'s son present, served as Uzbek Interpretor but patient is able to understand English. Pt. left as found, supine in bed (+)alarms (+)call bell in reach

## 2024-07-07 NOTE — PROGRESS NOTE ADULT - ASSESSMENT
79Y/F with HFpEF, chronic Afib, Obesity, Hypothyroid, Vertigo, GERD, HTN, Osteoarthritis,  presented to the ED for the evaluation   orthopnea and of shortness of breath    #ADHF WW pEF 2ry to Afib  - CXR increased BV markings  - Pro-Brain Natriuretic Peptide: 4592 pg/mL (07.06.24 @ 09:10), Troponin T, High Sensitivity Result: 20:   - SP Cardizem by EMR  - currently Afib is controlled symptoms controlled  - SP IV 40mg IVP Lasix, cw daily LVP lasix 40mg  - increased metoprolol to 50mg PO bid from 25  - FU TSH Iron profile  - Pending echo    #chronic Afib on Eliquis  - CHADsVaSc 3   - Eliquis 5mg bid  - metoprolol 50mg bid    #HTN  - cw losartan   - inc metoprolol to 50 bid    #Hypothyroid  - Follow AM TSH  - Keep home dose    MISC  GI panto  DVT Elqiuis  Full code

## 2024-07-07 NOTE — PROGRESS NOTE ADULT - ATTENDING COMMENTS
79Y/F with HFpEF, chronic Afib, Obesity, Hypothyroid, Vertigo, GERD, HTN, Osteoarthritis,  presented to the ED for the evaluation   orthopnea and of shortness of breath    #orthopnea and SOB could be from subacute HFpEF  #chronic Afib on Eliquis  #HTN/ Hypothyroid,     PLANs:    #orthopnea and SOB could be from subacute HFpEF or pulmonary edema 2/2 uncontrolled afib   - on RA, no congestion on CXR and no LE edema, could be subacute HF from elevated BP, VS Afib w RVR per EMS was given cardizem?  -  BNP 4000 > 2000 last month   - c/w IV Lasix 40mg qd, if remain stable expected to DC in 24 hr   - daily weight, strict i/os, DASH diet   metoprolol to 50mg increased   - c/w Eliquis and home meds  - trend trop one more time, if stable DC tele  - friedman erythema, Thyroid disease?  - TSH pending   - repeat echo  - trend CE   - full code.  - discussed with pt and son at bedside 79Y/F with HFpEF, chronic Afib, Obesity, Hypothyroid, Vertigo, GERD, HTN, Osteoarthritis,  presented to the ED for the evaluation   orthopnea and of shortness of breath    #orthopnea and SOB could be from subacute HFpEF  #chronic Afib on Eliquis  #HTN/ Hypothyroid,     PLANs:    #orthopnea and SOB could be from subacute HFpEF or pulmonary edema 2/2 uncontrolled afib   - on RA, no congestion on CXR and no LE edema, could be subacute HF from elevated BP, VS Afib w RVR per EMS was given cardizem?  -  BNP 4000 > 2000 last month   - c/w IV Lasix 40mg qd, if remain stable expected to DC in 24 hr   - daily weight, strict i/os, DASH diet   metoprolol to 50mg increased   - c/w Eliquis and home meds  - trend trop one more time, if stable DC tele  - friedman erythema, Thyroid disease?  - check dimer   - TSH pending   - repeat echo  - trend CE   - full code.  - discussed with pt and son at bedside 79Y/F with HFpEF, chronic Afib, Obesity, Hypothyroid, Vertigo, GERD, HTN, Osteoarthritis,  presented to the ED for the evaluation   orthopnea and of shortness of breath    #orthopnea and SOB could be from subacute HFpEF  #chronic Afib on Eliquis  #HTN/ Hypothyroid,     PLANs:    #orthopnea and SOB could be from subacute HFpEF or pulmonary edema 2/2 uncontrolled afib   - on RA, no congestion on CXR and no LE edema, could be subacute HF from elevated BP, VS Afib w RVR per EMS was given cardizem?  -  BNP 4000 > 2000 last month   - c/w IV Lasix 40mg qd, if remain stable expected to DC in 24 hr   - daily weight, strict i/os, DASH diet   metoprolol to 50mg increased   - c/w Eliquis and home meds  - trend trop one more time, if stable DC tele  - friedman erythema  - check dimer   - TSH pending   - repeat echo  - trend CE   - full code.  - discussed with pt and son at bedside

## 2024-07-07 NOTE — PHYSICAL THERAPY INITIAL EVALUATION ADULT - ADDITIONAL COMMENTS
Pt. reports she ambulates at home using a rolling walker with assistance. Pt. lives alone in a private house, not able to tell therapist how many stairs she has.

## 2024-07-08 LAB
A1C WITH ESTIMATED AVERAGE GLUCOSE RESULT: 5.8 % — HIGH (ref 4–5.6)
ANION GAP SERPL CALC-SCNC: 10 MMOL/L — SIGNIFICANT CHANGE UP (ref 7–14)
BUN SERPL-MCNC: 17 MG/DL — SIGNIFICANT CHANGE UP (ref 10–20)
CALCIUM SERPL-MCNC: 9.1 MG/DL — SIGNIFICANT CHANGE UP (ref 8.4–10.4)
CHLORIDE SERPL-SCNC: 102 MMOL/L — SIGNIFICANT CHANGE UP (ref 98–110)
CO2 SERPL-SCNC: 30 MMOL/L — SIGNIFICANT CHANGE UP (ref 17–32)
CREAT SERPL-MCNC: 0.8 MG/DL — SIGNIFICANT CHANGE UP (ref 0.7–1.5)
D DIMER BLD IA.RAPID-MCNC: <150 NG/ML DDU — SIGNIFICANT CHANGE UP
EGFR: 74 ML/MIN/1.73M2 — SIGNIFICANT CHANGE UP
ESTIMATED AVERAGE GLUCOSE: 120 MG/DL — HIGH (ref 68–114)
FERRITIN SERPL-MCNC: 107 NG/ML — SIGNIFICANT CHANGE UP (ref 13–330)
GLUCOSE SERPL-MCNC: 108 MG/DL — HIGH (ref 70–99)
MAGNESIUM SERPL-MCNC: 1.9 MG/DL — SIGNIFICANT CHANGE UP (ref 1.8–2.4)
POTASSIUM SERPL-MCNC: 3.3 MMOL/L — LOW (ref 3.5–5)
POTASSIUM SERPL-SCNC: 3.3 MMOL/L — LOW (ref 3.5–5)
SODIUM SERPL-SCNC: 142 MMOL/L — SIGNIFICANT CHANGE UP (ref 135–146)
TSH SERPL-MCNC: 12.71 UIU/ML — HIGH (ref 0.27–4.2)
TSH SERPL-MCNC: 12.76 UIU/ML — HIGH (ref 0.27–4.2)

## 2024-07-08 PROCEDURE — 99222 1ST HOSP IP/OBS MODERATE 55: CPT

## 2024-07-08 PROCEDURE — 99233 SBSQ HOSP IP/OBS HIGH 50: CPT

## 2024-07-08 RX ORDER — POTASSIUM CHLORIDE 600 MG/1
40 TABLET, FILM COATED, EXTENDED RELEASE ORAL EVERY 4 HOURS
Refills: 0 | Status: COMPLETED | OUTPATIENT
Start: 2024-07-08 | End: 2024-07-08

## 2024-07-08 RX ORDER — IRON SUCROSE 20 MG/ML
200 INJECTION, SOLUTION INTRAVENOUS EVERY 24 HOURS
Refills: 0 | Status: COMPLETED | OUTPATIENT
Start: 2024-07-08 | End: 2024-07-12

## 2024-07-08 RX ORDER — FUROSEMIDE 10 MG/ML
40 INJECTION, SOLUTION INTRAMUSCULAR; INTRAVENOUS
Refills: 0 | Status: DISCONTINUED | OUTPATIENT
Start: 2024-07-08 | End: 2024-07-11

## 2024-07-08 RX ORDER — LOSARTAN POTASSIUM 100 MG/1
100 TABLET, FILM COATED ORAL DAILY
Refills: 0 | Status: DISCONTINUED | OUTPATIENT
Start: 2024-07-08 | End: 2024-07-13

## 2024-07-08 RX ORDER — LEVOTHYROXINE SODIUM 25 MCG
125 TABLET ORAL DAILY
Refills: 0 | Status: DISCONTINUED | OUTPATIENT
Start: 2024-07-08 | End: 2024-07-13

## 2024-07-08 RX ORDER — IRON SUCROSE 20 MG/ML
200 INJECTION, SOLUTION INTRAVENOUS ONCE
Refills: 0 | Status: DISCONTINUED | OUTPATIENT
Start: 2024-07-08 | End: 2024-07-08

## 2024-07-08 RX ADMIN — ATORVASTATIN CALCIUM 20 MILLIGRAM(S): 20 TABLET, FILM COATED ORAL at 21:25

## 2024-07-08 RX ADMIN — Medication 50 MILLIGRAM(S): at 05:05

## 2024-07-08 RX ADMIN — POTASSIUM CHLORIDE 40 MILLIEQUIVALENT(S): 600 TABLET, FILM COATED, EXTENDED RELEASE ORAL at 12:31

## 2024-07-08 RX ADMIN — IRON SUCROSE 110 MILLIGRAM(S): 20 INJECTION, SOLUTION INTRAVENOUS at 13:55

## 2024-07-08 RX ADMIN — PANTOPRAZOLE SODIUM 40 MILLIGRAM(S): 40 INJECTION, POWDER, FOR SOLUTION INTRAVENOUS at 05:08

## 2024-07-08 RX ADMIN — LOSARTAN POTASSIUM 50 MILLIGRAM(S): 100 TABLET, FILM COATED ORAL at 05:05

## 2024-07-08 RX ADMIN — FUROSEMIDE 40 MILLIGRAM(S): 10 INJECTION, SOLUTION INTRAMUSCULAR; INTRAVENOUS at 17:27

## 2024-07-08 RX ADMIN — APIXABAN 5 MILLIGRAM(S): 5 TABLET, FILM COATED ORAL at 05:05

## 2024-07-08 RX ADMIN — FUROSEMIDE 40 MILLIGRAM(S): 10 INJECTION, SOLUTION INTRAMUSCULAR; INTRAVENOUS at 05:05

## 2024-07-08 RX ADMIN — POTASSIUM CHLORIDE 40 MILLIEQUIVALENT(S): 600 TABLET, FILM COATED, EXTENDED RELEASE ORAL at 17:27

## 2024-07-08 RX ADMIN — Medication 650 MILLIGRAM(S): at 03:23

## 2024-07-08 RX ADMIN — Medication 50 MILLIGRAM(S): at 17:28

## 2024-07-08 RX ADMIN — Medication 100 MICROGRAM(S): at 05:05

## 2024-07-08 RX ADMIN — APIXABAN 5 MILLIGRAM(S): 5 TABLET, FILM COATED ORAL at 17:28

## 2024-07-08 NOTE — CONSULT NOTE ADULT - ATTENDING COMMENTS
# Acute systolic heart failure, likely due to underdiuresis  # Afib with RVR   # Elevated TSH     Patient is compliant with medications and low salt diet, suggesting her home diuretic dose may be insufficient to maintain euvolemia. Please give Lasix 40 mg IV BID for goal net negative 1-2L daily. If not achieving goal, can increase to Lasix 60-80 mg IV BID.     Once patient can be weaned off oxygen at rest and with ambulation, would start Lasix 40 mg PO BID. She and her son was instructed they can increase her diuretic dose to Lasix 80 mg PO BID if she develops worsening CHF symptoms.     Cardiology consult team to sign off.

## 2024-07-08 NOTE — PROGRESS NOTE ADULT - SUBJECTIVE AND OBJECTIVE BOX
Hospital Day:  2d    Subjective:    Patient is a 79y old  Female who presents with a chief complaint of     Admitted to medicine for a primary diagnosis of     Past Medical Hx:   Osteoarthritis    HTN (hypertension)    GERD (gastroesophageal reflux disease)    H/O insomnia    Vertigo    Hypothyroid    Cataract    Obese      Past Sx:  History of repair of hip fracture    H/O hand surgery      Allergies:  No Known Allergies    Current Meds:   Standng Meds:  apixaban 5 milliGRAM(s) Oral every 12 hours  atorvastatin 20 milliGRAM(s) Oral at bedtime  furosemide   Injectable 40 milliGRAM(s) IV Push daily  levothyroxine 100 MICROGram(s) Oral daily  losartan 50 milliGRAM(s) Oral daily  metoprolol tartrate 50 milliGRAM(s) Oral two times a day  pantoprazole    Tablet 40 milliGRAM(s) Oral before breakfast    PRN Meds:  acetaminophen     Tablet .. 650 milliGRAM(s) Oral every 6 hours PRN Temp greater or equal to 38C (100.4F), Mild Pain (1 - 3)  aluminum hydroxide/magnesium hydroxide/simethicone Suspension 30 milliLiter(s) Oral every 4 hours PRN Dyspepsia  meclizine 12.5 milliGRAM(s) Oral three times a day PRN for dizziness  melatonin 3 milliGRAM(s) Oral at bedtime PRN Insomnia  ondansetron Injectable 4 milliGRAM(s) IV Push every 8 hours PRN Nausea and/or Vomiting  oxycodone    5 mG/acetaminophen 325 mG 1 Tablet(s) Oral every 6 hours PRN for severe pain    HOME MEDICATIONS:  Eliquis 5 mg oral tablet: 1 tab(s) orally 2 times a day  furosemide 40 mg oral tablet: 1 tab(s) orally once a day  levothyroxine 100 mcg (0.1 mg) oral tablet: 1 tab(s) orally once a day  metoprolol tartrate 25 mg oral tablet: 1 tab(s) orally 2 times a day  omeprazole 40 mg oral delayed release capsule: 1 cap(s) orally once a day  Percocet 5 mg-325 mg oral tablet: 1 tab(s) orally every 6 hours as needed for  severe pain  pravastatin 40 mg oral tablet: 1 tab(s) orally once a day  Vitamin D3 50 mcg (2000 intl units) oral capsule: 1 cap(s) orally once a day      Vital Signs:   T(F): 97.7 (07-08-24 @ 05:05), Max: 98.8 (07-07-24 @ 20:43)  HR: 97 (07-08-24 @ 05:05) (76 - 97)  BP: 160/100 (07-08-24 @ 05:05) (155/76 - 160/100)  RR: 18 (07-08-24 @ 05:05) (18 - 18)  SpO2: 99% (07-08-24 @ 05:05) (98% - 99%)      07-07-24 @ 07:01  -  07-08-24 @ 07:00  --------------------------------------------------------  IN: 845 mL / OUT: 1520 mL / NET: -675 mL        Physical Exam:   GENERAL: NAD  HEENT: NCAT  CHEST/LUNG: CTAB  HEART: Regular rate and rhythm; s1 s2 appreciated, No murmurs, rubs, or gallops  ABDOMEN: Soft, Nontender, Nondistended; Bowel sounds present  EXTREMITIES: No LE edema b/l  SKIN: no rashes, no new lesions  NERVOUS SYSTEM:  Alert & Oriented X3  LINES/CATHETERS:        Labs:                         12.7   9.78  )-----------( 247      ( 07 Jul 2024 08:05 )             41.6     Neutophil% 66.5, Lymphocyte% 24.5, Monocyte% 6.2, Bands% 0.3 07-07-24 @ 08:05    07 Jul 2024 08:05    139    |  103    |  15     ----------------------------<  101    3.4     |  24     |  0.8      Ca    9.1        07 Jul 2024 08:05  Mg     1.9       07 Jul 2024 08:05    TPro  6.7    /  Alb  4.4    /  TBili  0.7    /  DBili  x      /  AST  17     /  ALT  10     /  AlkPhos  64     07 Jul 2024 08:05       pTT    --             ----< 2.80 INR  (07-07-24 @ 08:05)    32.20        PT  Chol 102, LDL --, HDL 33, VLDL --, TRG 99 07-07-24 @ 08:05            Iron 33, TIBC 347, %Sat 10 Ferritin -- 07-07-24 @ 08:05      Urinalysis Basic - ( 07 Jul 2024 08:05 )    Color: x / Appearance: x / SG: x / pH: x  Gluc: 101 mg/dL / Ketone: x  / Bili: x / Urobili: x   Blood: x / Protein: x / Nitrite: x   Leuk Esterase: x / RBC: x / WBC x   Sq Epi: x / Non Sq Epi: x / Bacteria: x             Hospital Day:  2d    Subjective:    Patient is a 79y old  Female who presents with a chief complaint of     Admitted to medicine for a primary diagnosis of     Past Medical Hx:   Osteoarthritis    HTN (hypertension)    GERD (gastroesophageal reflux disease)    H/O insomnia    Vertigo    Hypothyroid    Cataract    Obese      Past Sx:  History of repair of hip fracture    H/O hand surgery      Allergies:  No Known Allergies    Current Meds:   Standng Meds:  apixaban 5 milliGRAM(s) Oral every 12 hours  atorvastatin 20 milliGRAM(s) Oral at bedtime  furosemide   Injectable 40 milliGRAM(s) IV Push daily  levothyroxine 100 MICROGram(s) Oral daily  losartan 50 milliGRAM(s) Oral daily  metoprolol tartrate 50 milliGRAM(s) Oral two times a day  pantoprazole    Tablet 40 milliGRAM(s) Oral before breakfast    PRN Meds:  acetaminophen     Tablet .. 650 milliGRAM(s) Oral every 6 hours PRN Temp greater or equal to 38C (100.4F), Mild Pain (1 - 3)  aluminum hydroxide/magnesium hydroxide/simethicone Suspension 30 milliLiter(s) Oral every 4 hours PRN Dyspepsia  meclizine 12.5 milliGRAM(s) Oral three times a day PRN for dizziness  melatonin 3 milliGRAM(s) Oral at bedtime PRN Insomnia  ondansetron Injectable 4 milliGRAM(s) IV Push every 8 hours PRN Nausea and/or Vomiting  oxycodone    5 mG/acetaminophen 325 mG 1 Tablet(s) Oral every 6 hours PRN for severe pain    HOME MEDICATIONS:  Eliquis 5 mg oral tablet: 1 tab(s) orally 2 times a day  furosemide 40 mg oral tablet: 1 tab(s) orally once a day  levothyroxine 100 mcg (0.1 mg) oral tablet: 1 tab(s) orally once a day  metoprolol tartrate 25 mg oral tablet: 1 tab(s) orally 2 times a day  omeprazole 40 mg oral delayed release capsule: 1 cap(s) orally once a day  Percocet 5 mg-325 mg oral tablet: 1 tab(s) orally every 6 hours as needed for  severe pain  pravastatin 40 mg oral tablet: 1 tab(s) orally once a day  Vitamin D3 50 mcg (2000 intl units) oral capsule: 1 cap(s) orally once a day      Vital Signs:   T(F): 97.7 (07-08-24 @ 05:05), Max: 98.8 (07-07-24 @ 20:43)  HR: 97 (07-08-24 @ 05:05) (76 - 97)  BP: 160/100 (07-08-24 @ 05:05) (155/76 - 160/100)  RR: 18 (07-08-24 @ 05:05) (18 - 18)  SpO2: 99% (07-08-24 @ 05:05) (98% - 99%)      07-07-24 @ 07:01  -  07-08-24 @ 07:00  --------------------------------------------------------  IN: 845 mL / OUT: 1520 mL / NET: -675 mL        Physical Exam:   GENERAL: NAD appears ill  HEENT: NCAT  CHEST/LUNG: CTAB  HEART: Regular rate Irregular rhythm; s1 s2 appreciated, No murmurs, rubs, or gallops  ABDOMEN: Soft, Nontender, Nondistended; Bowel sounds present  EXTREMITIES: No LE edema b/l  SKIN: no rashes, no new lesions  NERVOUS SYSTEM:  Alert & Oriented X3  LINES/CATHETERS:        Labs:                         12.7   9.78  )-----------( 247      ( 07 Jul 2024 08:05 )             41.6     Neutophil% 66.5, Lymphocyte% 24.5, Monocyte% 6.2, Bands% 0.3 07-07-24 @ 08:05    07 Jul 2024 08:05    139    |  103    |  15     ----------------------------<  101    3.4     |  24     |  0.8      Ca    9.1        07 Jul 2024 08:05  Mg     1.9       07 Jul 2024 08:05    TPro  6.7    /  Alb  4.4    /  TBili  0.7    /  DBili  x      /  AST  17     /  ALT  10     /  AlkPhos  64     07 Jul 2024 08:05       pTT    --             ----< 2.80 INR  (07-07-24 @ 08:05)    32.20        PT  Chol 102, LDL --, HDL 33, VLDL --, TRG 99 07-07-24 @ 08:05            Iron 33, TIBC 347, %Sat 10 Ferritin -- 07-07-24 @ 08:05      Urinalysis Basic - ( 07 Jul 2024 08:05 )    Color: x / Appearance: x / SG: x / pH: x  Gluc: 101 mg/dL / Ketone: x  / Bili: x / Urobili: x   Blood: x / Protein: x / Nitrite: x   Leuk Esterase: x / RBC: x / WBC x   Sq Epi: x / Non Sq Epi: x / Bacteria: x

## 2024-07-08 NOTE — CHART NOTE - NSCHARTNOTEFT_GEN_A_CORE
Dietitian consult for decompensated heart failure, salt & fluid restriction. Reviewed diet order. Discussed CHF nutrition therapy concepts. Reviewed nutrients involved & interactions. Written materials of aforementioned education from Nutrition Care Manual.

## 2024-07-08 NOTE — PROGRESS NOTE ADULT - ASSESSMENT
79Y/F with HFpEF, chronic Afib, Obesity, Hypothyroid, Vertigo, GERD, HTN, Osteoarthritis,  presented to the ED for the evaluation   orthopnea and of shortness of breath    #ADHF WW pEF 2ry to Afib  - CXR increased BV markings  - Pro-Brain Natriuretic Peptide: 4592 pg/mL (07.06.24 @ 09:10), Troponin T, High Sensitivity Result: 20:   - SP Cardizem by EMR  - currently Afib is controlled symptoms controlled  - SP IV 40mg IVP Lasix, cw daily LVP lasix 40mg  - increased metoprolol to 50mg PO bid from 25  - FU TSH Iron profile  - Pending echo    #chronic Afib on Eliquis  - CHADsVaSc 3   - Eliquis 5mg bid  - metoprolol 50mg bid    #HTN  - cw losartan   - inc metoprolol to 50 bid    #Hypothyroid  - Follow AM TSH  - Keep home dose    MISC  GI panto  DVT Elqiuis  Full code   79Y/F with HFpEF, chronic Afib, Obesity, Hypothyroid, Vertigo, GERD, HTN, Osteoarthritis,  presented to the ED for the evaluation orthopnea and of shortness of breath    #Elevated troponin  -Trending upwards  - 13>20>25>34   - F/U EKG   - F/U cardio consult    #ADHF WW pEF 2ry to Afib  - CXR increased BV markings  - Pro-Brain Natriuretic Peptide: 4592 pg/mL), Troponin T, High Sensitivity Result: 20:   - ECHO 07/07 EF 45-50%  - SP Cardizem by EMR  - currently Afib is controlled symptoms controlled  - SP IV 40mg IVP Lasix, cw daily LVP lasix 40mg  - increased metoprolol to 50mg PO bid from 25    #chronic Afib on Eliquis  - CHADsVaSc 3   - Eliquis 5mg bid  - metoprolol 50mg bid    #HTN  - cw losartan   - inc metoprolol to 50 bid    #Hypothyroid  - TSH elevated  - Keep home dose discuss new dose for management     MISC  GI panto  DVT Elqiuis  Full code

## 2024-07-08 NOTE — CONSULT NOTE ADULT - SUBJECTIVE AND OBJECTIVE BOX
Outpt cardiologist:    HPI:  79-year-old female history of obesity, cataracts, hypothyroidism, vertigo, insomnia, GERD, hypertension, osteoarthritis, HFmrEF (45-50%), Longterm persistent A-fib on Eliquis, pulmonary hypertension, recent admission for shortness of breath and lower extremity edema at that time had a CCTA with a CAD RADSN, recently told to increase her Lasix from 40 mg once a day to 40 mg twice a day presents to the ED accompanied by son complaining of shortness of breath worse of the past 3 to 4 days.  Positive orthopnea.  Mildly increased lower extremity edema.  Last night felt dizzy and lightheaded.  When they called the ambulance today, was noted to be in A-fib RVR which resolved after 25 mg push of Cardizem.  Patient's dizziness has improved, but continues to endorse shortness of breath.  No fevers or chills, no cough or congestion, no nausea or vomiting or diarrhea, no bleeding events, no urinary symptoms.    Pro-Brain Natriuretic Peptide: 4592 pg/mL (07.06.24 @ 09:10)   Troponin T, High Sensitivity Result: 20:     SP IV 40mg lasix (06 Jul 2024 12:04)      ---  cardio fellow additional notes:    Patient examined at bedside. Says she is improved by 30-50% since admission, endorses some shortness of breath, can lay flat. LE edema improved. No chest pain, palpitations.    PAST MEDICAL & SURGICAL HISTORY  Osteoarthritis  feet    HTN (hypertension)    GERD (gastroesophageal reflux disease)    H/O insomnia    Vertigo    Hypothyroid    Cataract    Obese    History of repair of hip fracture  AURE    H/O hand surgery  WRIST   SCREW AND AURE LEFT        FAMILY HISTORY:  FAMILY HISTORY:  Heart disease in sister    SOCIAL HISTORY:  Social History:  Ex-smoker   stopped 40 years ago  smoker less than a pack for 10 years    ALLERGIES:  No Known Allergies      MEDICATIONS:  apixaban 5 milliGRAM(s) Oral every 12 hours  atorvastatin 20 milliGRAM(s) Oral at bedtime  furosemide   Injectable 40 milliGRAM(s) IV Push daily  levothyroxine 100 MICROGram(s) Oral daily  losartan 50 milliGRAM(s) Oral daily  metoprolol tartrate 50 milliGRAM(s) Oral two times a day  pantoprazole    Tablet 40 milliGRAM(s) Oral before breakfast    PRN:  acetaminophen     Tablet .. 650 milliGRAM(s) Oral every 6 hours PRN  aluminum hydroxide/magnesium hydroxide/simethicone Suspension 30 milliLiter(s) Oral every 4 hours PRN  meclizine 12.5 milliGRAM(s) Oral three times a day PRN  melatonin 3 milliGRAM(s) Oral at bedtime PRN  ondansetron Injectable 4 milliGRAM(s) IV Push every 8 hours PRN  oxycodone    5 mG/acetaminophen 325 mG 1 Tablet(s) Oral every 6 hours PRN      HOME MEDICATIONS:  Home Medications:  Eliquis 5 mg oral tablet: 1 tab(s) orally 2 times a day (30 May 2024 16:23)  furosemide 40 mg oral tablet: 1 tab(s) orally once a day (04 Jun 2024 12:50)  levothyroxine 100 mcg (0.1 mg) oral tablet: 1 tab(s) orally once a day (04 Jun 2024 12:50)  metoprolol tartrate 25 mg oral tablet: 1 tab(s) orally 2 times a day (30 May 2024 16:25)  omeprazole 40 mg oral delayed release capsule: 1 cap(s) orally once a day (30 May 2024 16:22)  Percocet 5 mg-325 mg oral tablet: 1 tab(s) orally every 6 hours as needed for  severe pain (30 May 2024 16:21)  pravastatin 40 mg oral tablet: 1 tab(s) orally once a day (30 May 2024 16:22)  Vitamin D3 50 mcg (2000 intl units) oral capsule: 1 cap(s) orally once a day (30 May 2024 16:22)      VITALS:   T(F): 97.7 (07-08 @ 05:05), Max: 98.8 (07-07 @ 20:43)  HR: 89 (07-08 @ 09:57) (68 - 97)  BP: 145/78 (07-08 @ 09:57) (140/89 - 171/94)  BP(mean): --  RR: 18 (07-08 @ 05:05) (18 - 20)  SpO2: 96% (07-08 @ 09:57) (96% - 99%)    I&O's Summary    07 Jul 2024 07:01  -  08 Jul 2024 07:00  --------------------------------------------------------  IN: 845 mL / OUT: 1520 mL / NET: -675 mL    08 Jul 2024 07:01  -  08 Jul 2024 10:38  --------------------------------------------------------  IN: 210 mL / OUT: 0 mL / NET: 210 mL        REVIEW OF SYSTEMS:  CONSTITUTIONAL: No weakness, fevers or chills  HEENT: No visual changes, neck/ear pain  RESPIRATORY: SOB  CARDIOVASCULAR: See HPI  GASTROINTESTINAL: No abdominal pain. No nausea, vomiting, diarrhea   GENITOURINARY: No dysuria, frequency or hematuria  NEUROLOGICAL: No new focal deficits  SKIN: No new rashes    PHYSICAL EXAM:  General: Not in distress.  Non-toxic appearing.   HEENT: JVD up to jaw angle  Cardio: regular, S1, S2, systolic murmur  Pulm: B/L BS.  mild wheezing  Abdomen: Soft, non-tender, non-distended. Normoactive bowel sounds  Extremities: B/L LE edema  Neuro: A&O x3. No focal deficits    LABS:                        12.7   9.78  )-----------( 247      ( 07 Jul 2024 08:05 )             41.6     07-08    142  |  102  |  17  ----------------------------<  108<H>  3.3<L>   |  30  |  0.8    Ca    9.1      08 Jul 2024 06:50  Mg     1.9     07-08    TPro  6.7  /  Alb  4.4  /  TBili  0.7  /  DBili  x   /  AST  17  /  ALT  10  /  AlkPhos  64  07-07    PT/INR - ( 07 Jul 2024 08:05 )   PT: 32.20 sec;   INR: 2.80 ratio                   Troponin trend:  20-25-34    07-07 Chol 102 LDL -- HDL 33<L> Trig 99      RADIOLOGY:  -CXR: cardiomegaly  -TTE: 45-50%, enlarged RA and LA  -CCTA: CARD-RADN, Ca score 10% percentile  -STRESS TEST:  -CATHETERIZATION:  -OTHER:  ECG:  AFIB. low voltage    TELEMETRY EVENTS:  Afib   Outpt cardiologist:  Dr. Tang (out of network)    HPI:  79-year-old female history of obesity, cataracts, hypothyroidism, vertigo, insomnia, GERD, hypertension, osteoarthritis, HFmrEF (45-50%), Longterm persistent A-fib on Eliquis, pulmonary hypertension, recent admission for shortness of breath and lower extremity edema at that time had a CCTA with a CAD RADSN, recently told to increase her Lasix from 40 mg once a day to 40 mg twice a day presents to the ED accompanied by son complaining of shortness of breath worse of the past 3 to 4 days.  Positive orthopnea.  Mildly increased lower extremity edema.  Last night felt dizzy and lightheaded.  When they called the ambulance today, was noted to be in A-fib RVR which resolved after 25 mg push of Cardizem.  Patient's dizziness has improved, but continues to endorse shortness of breath.  No fevers or chills, no cough or congestion, no nausea or vomiting or diarrhea, no bleeding events, no urinary symptoms.    Pro-Brain Natriuretic Peptide: 4592 pg/mL (07.06.24 @ 09:10)   Troponin T, High Sensitivity Result: 20:     SP IV 40mg lasix (06 Jul 2024 12:04)      ---  cardio fellow additional notes:    Patient examined at bedside. Says she is improved by 30-50% since admission, endorses some shortness of breath, can lay flat. LE edema improved. No chest pain, palpitations.    PAST MEDICAL & SURGICAL HISTORY  Osteoarthritis  feet    HTN (hypertension)    GERD (gastroesophageal reflux disease)    H/O insomnia    Vertigo    Hypothyroid    Cataract    Obese    History of repair of hip fracture  AURE    H/O hand surgery  WRIST   SCREW AND AURE LEFT        FAMILY HISTORY:  FAMILY HISTORY:  Heart disease in sister    SOCIAL HISTORY:  Social History:  Ex-smoker   stopped 40 years ago  smoker less than a pack for 10 years    ALLERGIES:  No Known Allergies      MEDICATIONS:  apixaban 5 milliGRAM(s) Oral every 12 hours  atorvastatin 20 milliGRAM(s) Oral at bedtime  furosemide   Injectable 40 milliGRAM(s) IV Push daily  levothyroxine 100 MICROGram(s) Oral daily  losartan 50 milliGRAM(s) Oral daily  metoprolol tartrate 50 milliGRAM(s) Oral two times a day  pantoprazole    Tablet 40 milliGRAM(s) Oral before breakfast    PRN:  acetaminophen     Tablet .. 650 milliGRAM(s) Oral every 6 hours PRN  aluminum hydroxide/magnesium hydroxide/simethicone Suspension 30 milliLiter(s) Oral every 4 hours PRN  meclizine 12.5 milliGRAM(s) Oral three times a day PRN  melatonin 3 milliGRAM(s) Oral at bedtime PRN  ondansetron Injectable 4 milliGRAM(s) IV Push every 8 hours PRN  oxycodone    5 mG/acetaminophen 325 mG 1 Tablet(s) Oral every 6 hours PRN      HOME MEDICATIONS:  Home Medications:  Eliquis 5 mg oral tablet: 1 tab(s) orally 2 times a day (30 May 2024 16:23)  furosemide 40 mg oral tablet: 1 tab(s) orally once a day (04 Jun 2024 12:50)  levothyroxine 100 mcg (0.1 mg) oral tablet: 1 tab(s) orally once a day (04 Jun 2024 12:50)  metoprolol tartrate 25 mg oral tablet: 1 tab(s) orally 2 times a day (30 May 2024 16:25)  omeprazole 40 mg oral delayed release capsule: 1 cap(s) orally once a day (30 May 2024 16:22)  Percocet 5 mg-325 mg oral tablet: 1 tab(s) orally every 6 hours as needed for  severe pain (30 May 2024 16:21)  pravastatin 40 mg oral tablet: 1 tab(s) orally once a day (30 May 2024 16:22)  Vitamin D3 50 mcg (2000 intl units) oral capsule: 1 cap(s) orally once a day (30 May 2024 16:22)      VITALS:   T(F): 97.7 (07-08 @ 05:05), Max: 98.8 (07-07 @ 20:43)  HR: 89 (07-08 @ 09:57) (68 - 97)  BP: 145/78 (07-08 @ 09:57) (140/89 - 171/94)  BP(mean): --  RR: 18 (07-08 @ 05:05) (18 - 20)  SpO2: 96% (07-08 @ 09:57) (96% - 99%)    I&O's Summary    07 Jul 2024 07:01  -  08 Jul 2024 07:00  --------------------------------------------------------  IN: 845 mL / OUT: 1520 mL / NET: -675 mL    08 Jul 2024 07:01  -  08 Jul 2024 10:38  --------------------------------------------------------  IN: 210 mL / OUT: 0 mL / NET: 210 mL        REVIEW OF SYSTEMS:  CONSTITUTIONAL: No weakness, fevers or chills  HEENT: No visual changes, neck/ear pain  RESPIRATORY: SOB  CARDIOVASCULAR: See HPI  GASTROINTESTINAL: No abdominal pain. No nausea, vomiting, diarrhea   GENITOURINARY: No dysuria, frequency or hematuria  NEUROLOGICAL: No new focal deficits  SKIN: No new rashes    PHYSICAL EXAM:  General: Not in distress.  Non-toxic appearing.   HEENT: JVD up to jaw angle  Cardio: regular, S1, S2, systolic murmur  Pulm: B/L BS.  mild wheezing  Abdomen: Soft, non-tender, non-distended. Normoactive bowel sounds  Extremities: B/L LE edema  Neuro: A&O x3. No focal deficits    LABS:                        12.7   9.78  )-----------( 247      ( 07 Jul 2024 08:05 )             41.6     07-08    142  |  102  |  17  ----------------------------<  108<H>  3.3<L>   |  30  |  0.8    Ca    9.1      08 Jul 2024 06:50  Mg     1.9     07-08    TPro  6.7  /  Alb  4.4  /  TBili  0.7  /  DBili  x   /  AST  17  /  ALT  10  /  AlkPhos  64  07-07    PT/INR - ( 07 Jul 2024 08:05 )   PT: 32.20 sec;   INR: 2.80 ratio                   Troponin trend:  20-25-34    07-07 Chol 102 LDL -- HDL 33<L> Trig 99      RADIOLOGY:  -CXR: cardiomegaly  -TTE: 45-50%, enlarged RA and LA  -CCTA: CARD-RADN, Ca score 10% percentile  -STRESS TEST:  -CATHETERIZATION:  -OTHER:  ECG:  AFIB. low voltage    TELEMETRY EVENTS:  Afib

## 2024-07-08 NOTE — PROGRESS NOTE ADULT - ASSESSMENT
79-year-old female history of obesity, hypothyroidism, vertigo, insomnia, GERD, hypertension, osteoarthritis, HFpEF mod TR A-fib on Eliquis, aortic stenosis, pulmonary hypertension, recent admission for shortness of breath and lower extremity edema presents to the ED accompanied by son complaining of shortness of breath worse of the past 3 to 4 days.  At night felt dizzy and lightheaded. When they called the ambulance in AM, was noted to be in A-fib RVR which resolved after 25 mg push of Cardizem.    PAF  SOB  Obesity  Chronic HFmrEF               PLAN: 79-year-old female history of obesity, hypothyroidism, vertigo, insomnia, GERD, hypertension, osteoarthritis, HFpEF mod TR A-fib on Eliquis, aortic stenosis, pulmonary hypertension, recent admission for shortness of breath and lower extremity edema presents to the ED accompanied by son complaining of shortness of breath worse of the past 3 to 4 days.  At night felt dizzy and lightheaded. When they called the ambulance in AM, was noted to be in A-fib RVR which resolved after 25 mg push of Cardizem.    Acute HFmrEF  CP  Long term persistent A-fib  Obesity               PLAN:    ·	Tele reviewed by me. In A-fib  ·	EKG on admission: A-fib 71/min. Q waves in V1-V2. RAD (Interpreted by me)  ·	Troponin: 20-->25-->34  ·	ECHO reviewed. EF is 45-50%  ·	Pro-BNP 1975-->4592  ·	Cardiology eval  ·	O/E pt is fluid overload. Increase Lasix to 40 mg iv q 12h  ·	Check i's and o's and daily wt  ·	Low salt diet and water restriction to 1.5 L/D  ·	Monitor electrolytes. Replete K  ·	Serum iron is 33 and percent sat is 10. Start her on Venofer 200 mg iv daily x 5 doses  ·	On GDMT. Cont Losartan and Metoprolol Succinate  ·	Old record reviewed. Had CCTA done 6/4/24. Extensive motion artifact.     Progress Note Handoff    Pending (specify):  Consults__Cardiology_______, Tests________, Test Results_______, Other_________  Family discussion: Diagnosis and plan of care d/w the son   Disposition: Home___/SNF___/Other________/Unknown at this time________    Kael Lobo MD  Spectra: [phone]       79-year-old female history of obesity, hypothyroidism, vertigo, insomnia, GERD, hypertension, osteoarthritis, HFpEF mod TR A-fib on Eliquis, aortic stenosis, pulmonary hypertension, recent admission for shortness of breath and lower extremity edema presents to the ED accompanied by son complaining of shortness of breath worse of the past 3 to 4 days.  At night felt dizzy and lightheaded. When they called the ambulance in AM, was noted to be in A-fib RVR which resolved after 25 mg push of Cardizem.    Acute HFmrEF  CP  Long term persistent A-fib  Obesity               PLAN:    ·	Tele reviewed by me. In A-fib  ·	EKG on admission: A-fib 71/min. Q waves in V1-V2. RAD (Interpreted by me)  ·	Troponin: 20-->25-->34  ·	ECHO reviewed. EF is 45-50%  ·	Pro-BNP 1975-->4592  ·	Cardiology eval  ·	O/E pt is fluid overload. Increase Lasix to 40 mg iv q 12h  ·	Check i's and o's and daily wt  ·	Low salt diet and water restriction to 1.5 L/D  ·	Monitor electrolytes. Replete K  ·	Serum iron is 33 and percent sat is 10. Start her on Venofer 200 mg iv daily x 5 doses  ·	On GDMT. Cont Losartan and Metoprolol Succinate. As the BP is running high, increase Losartan to 100 mg po daily  ·	Old record reviewed. Had CCTA done 6/4/24. Extensive motion artifact.     Progress Note Handoff    Pending (specify):  Consults__Cardiology_______, Tests________, Test Results_______, Other_________  Family discussion: Diagnosis and plan of care d/w the son   Disposition: Home___/SNF___/Other________/Unknown at this time________    Kael Lobo MD  Spectra: [phone]

## 2024-07-08 NOTE — PROGRESS NOTE ADULT - SUBJECTIVE AND OBJECTIVE BOX
AMARA UMANZOR  79y Female    CHIEF COMPLAINT:    Patient is a 79y old  Female who presents with a chief complaint of     INTERVAL HPI/OVERNIGHT EVENTS:    Patient seen and examined.    ROS: All other systems are negative.    Vital Signs:    T(F): 97.7 (07-08-24 @ 05:05), Max: 98.8 (07-07-24 @ 20:43)  HR: 97 (07-08-24 @ 05:05) (76 - 97)  BP: 160/100 (07-08-24 @ 05:05) (155/76 - 160/100)  RR: 18 (07-08-24 @ 05:05) (18 - 18)  SpO2: 99% (07-08-24 @ 05:05) (98% - 99%)  I&O's Summary    07 Jul 2024 07:01  -  08 Jul 2024 07:00  --------------------------------------------------------  IN: 845 mL / OUT: 1520 mL / NET: -675 mL      Daily     Daily   CAPILLARY BLOOD GLUCOSE      POCT Blood Glucose.: 118 mg/dL (07 Jul 2024 11:08)      PHYSICAL EXAM:    GENERAL:  NAD  SKIN: No rashes or lesions  HENT: Atraumatic. Normocephalic. PERRL. Moist membranes.  NECK: Supple, No JVD. No lymphadenopathy.  PULMONARY: CTA B/L. No wheezing. No rales  CVS: Normal S1, S2. Rate and Rhythm are regular. No murmurs.  ABDOMEN/GI: Soft, Nontender, Nondistended; BS present  EXTREMITIES: Peripheral pulses intact. No edema B/L LE.  NEUROLOGIC:  No motor or sensory deficit.  PSYCH: Alert & oriented x 3    Consultant(s) Notes Reviewed:  [x ] YES  [ ] NO  Care Discussed with Consultants/Other Providers [ x] YES  [ ] NO    EKG reviewed  Telemetry reviewed    LABS:                        12.7   9.78  )-----------( 247      ( 07 Jul 2024 08:05 )             41.6     07-07    139  |  103  |  15  ----------------------------<  101<H>  3.4<L>   |  24  |  0.8    Ca    9.1      07 Jul 2024 08:05  Mg     1.9     07-07    TPro  6.7  /  Alb  4.4  /  TBili  0.7  /  DBili  x   /  AST  17  /  ALT  10  /  AlkPhos  64  07-07    PT/INR - ( 07 Jul 2024 08:05 )   PT: 32.20 sec;   INR: 2.80 ratio         PTT - ( 06 Jul 2024 09:10 )  PTT:43.0 sec          RADIOLOGY & ADDITIONAL TESTS:    < from: CT Angio Heart and Coronaries w/ IV Cont (06.04.24 @ 14:03) >  IMPRESSION:  Extensive motion artifact precluding reliable evaluation of the coronary   arteries    The total Agatston coronary artery calcium score equals 1, which   corresponds to 10th percentile for age, gender and ethnicity.    < end of copied text >  < from: Xray Chest 1 View-PORTABLE IMMEDIATE (Xray Chest 1 View-PORTABLE IMMEDIATE .) (07.06.24 @ 09:29) >    Impression:    No radiographic evidence of acute cardiopulmonary disease.      < end of copied text >  < from: TTE Echo Complete w/ Contrast w/o Doppler (07.07.24 @ 12:06) >  Summary:   1. Technically difficult and limited study.   2. Endocardial visualization was enhanced with intravenous echo contrast.   3. Left ventricular ejection fraction, by visual estimation, is 45 to   50%.   4. Mildly decreased global left ventricular systolic function.   5. The left ventricular diastolic function could not be assessed in this   study.   6. Moderately enlarged right ventricle with normal systolic function.   7. Moderately enlarged left atrium.   8. Severely enlarged right atrium.   9. The aortic valve was not well visualized; appears calcified with   decreased opening.  10. Trivial aortic regurgitation.  11. Mild mitral regurgitation.  12. Mild tricuspid regurgitation.  13. Mild dilatation of the ascending aorta.    < end of copied text >  < from: TTE Echo Complete w/ Contrast w/o Doppler (07.07.24 @ 12:06) >  Venous: The inferior vena cava is dilated.    < end of copied text >    Imaging or report Personally Reviewed:  [x ] YES  [ ] NO    Medications:  Standing  apixaban 5 milliGRAM(s) Oral every 12 hours  atorvastatin 20 milliGRAM(s) Oral at bedtime  furosemide   Injectable 40 milliGRAM(s) IV Push daily  levothyroxine 100 MICROGram(s) Oral daily  losartan 50 milliGRAM(s) Oral daily  metoprolol tartrate 50 milliGRAM(s) Oral two times a day  pantoprazole    Tablet 40 milliGRAM(s) Oral before breakfast    PRN Meds  acetaminophen     Tablet .. 650 milliGRAM(s) Oral every 6 hours PRN  aluminum hydroxide/magnesium hydroxide/simethicone Suspension 30 milliLiter(s) Oral every 4 hours PRN  meclizine 12.5 milliGRAM(s) Oral three times a day PRN  melatonin 3 milliGRAM(s) Oral at bedtime PRN  ondansetron Injectable 4 milliGRAM(s) IV Push every 8 hours PRN  oxycodone    5 mG/acetaminophen 325 mG 1 Tablet(s) Oral every 6 hours PRN      Case discussed with resident    Care discussed with pt/family           AMARA UMANZOR  79y Female    CHIEF COMPLAINT:    Patient is a 79y old  Female who presents with a chief complaint of     INTERVAL HPI/OVERNIGHT EVENTS:    Patient seen and examined. C/O intermittent pressure type cp and sob. BP is running high    ROS: All other systems are negative.    Vital Signs:    T(F): 97.7 (07-08-24 @ 05:05), Max: 98.8 (07-07-24 @ 20:43)  HR: 97 (07-08-24 @ 05:05) (76 - 97)  BP: 160/100 (07-08-24 @ 05:05) (155/76 - 160/100)  RR: 18 (07-08-24 @ 05:05) (18 - 18)  SpO2: 99% (07-08-24 @ 05:05) (98% - 99%)  I&O's Summary    07 Jul 2024 07:01  -  08 Jul 2024 07:00  --------------------------------------------------------  IN: 845 mL / OUT: 1520 mL / NET: -675 mL      Daily     Daily   CAPILLARY BLOOD GLUCOSE      POCT Blood Glucose.: 118 mg/dL (07 Jul 2024 11:08)      PHYSICAL EXAM:    GENERAL:  NAD  SKIN: No rashes or lesions  HENT: Atraumatic. Normocephalic. PERRL. Moist membranes.  NECK: Supple, +ve JVD. No lymphadenopathy.  PULMONARY: CTA B/L. No wheezing. No rales  CVS: Normal S1, S2. Rate and Rhythm are regular. No murmurs.  ABDOMEN/GI: Soft, Nontender, Nondistended; BS present  EXTREMITIES: Peripheral pulses intact. No edema B/L LE.  NEUROLOGIC:  No motor or sensory deficit.  PSYCH: Alert & oriented x 3    Consultant(s) Notes Reviewed:  [x ] YES  [ ] NO  Care Discussed with Consultants/Other Providers [ x] YES  [ ] NO    EKG reviewed  Telemetry reviewed    LABS:                        12.7   9.78  )-----------( 247      ( 07 Jul 2024 08:05 )             41.6     07-07    139  |  103  |  15  ----------------------------<  101<H>  3.4<L>   |  24  |  0.8    Ca    9.1      07 Jul 2024 08:05  Mg     1.9     07-07    TPro  6.7  /  Alb  4.4  /  TBili  0.7  /  DBili  x   /  AST  17  /  ALT  10  /  AlkPhos  64  07-07    PT/INR - ( 07 Jul 2024 08:05 )   PT: 32.20 sec;   INR: 2.80 ratio         PTT - ( 06 Jul 2024 09:10 )  PTT:43.0 sec          RADIOLOGY & ADDITIONAL TESTS:    < from: CT Angio Heart and Coronaries w/ IV Cont (06.04.24 @ 14:03) >  IMPRESSION:  Extensive motion artifact precluding reliable evaluation of the coronary   arteries    The total Agatston coronary artery calcium score equals 1, which   corresponds to 10th percentile for age, gender and ethnicity.    < end of copied text >  < from: Xray Chest 1 View-PORTABLE IMMEDIATE (Xray Chest 1 View-PORTABLE IMMEDIATE .) (07.06.24 @ 09:29) >    Impression:    No radiographic evidence of acute cardiopulmonary disease.      < end of copied text >  < from: TTE Echo Complete w/ Contrast w/o Doppler (07.07.24 @ 12:06) >  Summary:   1. Technically difficult and limited study.   2. Endocardial visualization was enhanced with intravenous echo contrast.   3. Left ventricular ejection fraction, by visual estimation, is 45 to   50%.   4. Mildly decreased global left ventricular systolic function.   5. The left ventricular diastolic function could not be assessed in this   study.   6. Moderately enlarged right ventricle with normal systolic function.   7. Moderately enlarged left atrium.   8. Severely enlarged right atrium.   9. The aortic valve was not well visualized; appears calcified with   decreased opening.  10. Trivial aortic regurgitation.  11. Mild mitral regurgitation.  12. Mild tricuspid regurgitation.  13. Mild dilatation of the ascending aorta.    < end of copied text >  < from: TTE Echo Complete w/ Contrast w/o Doppler (07.07.24 @ 12:06) >  Venous: The inferior vena cava is dilated.    < end of copied text >    Imaging or report Personally Reviewed:  [x ] YES  [ ] NO    Medications:  Standing  apixaban 5 milliGRAM(s) Oral every 12 hours  atorvastatin 20 milliGRAM(s) Oral at bedtime  furosemide   Injectable 40 milliGRAM(s) IV Push daily  levothyroxine 100 MICROGram(s) Oral daily  losartan 50 milliGRAM(s) Oral daily  metoprolol tartrate 50 milliGRAM(s) Oral two times a day  pantoprazole    Tablet 40 milliGRAM(s) Oral before breakfast    PRN Meds  acetaminophen     Tablet .. 650 milliGRAM(s) Oral every 6 hours PRN  aluminum hydroxide/magnesium hydroxide/simethicone Suspension 30 milliLiter(s) Oral every 4 hours PRN  meclizine 12.5 milliGRAM(s) Oral three times a day PRN  melatonin 3 milliGRAM(s) Oral at bedtime PRN  ondansetron Injectable 4 milliGRAM(s) IV Push every 8 hours PRN  oxycodone    5 mG/acetaminophen 325 mG 1 Tablet(s) Oral every 6 hours PRN      Case discussed with resident    Care discussed with pt/family

## 2024-07-09 LAB
ANION GAP SERPL CALC-SCNC: 11 MMOL/L — SIGNIFICANT CHANGE UP (ref 7–14)
ANION GAP SERPL CALC-SCNC: 15 MMOL/L — HIGH (ref 7–14)
BUN SERPL-MCNC: 15 MG/DL — SIGNIFICANT CHANGE UP (ref 10–20)
BUN SERPL-MCNC: 15 MG/DL — SIGNIFICANT CHANGE UP (ref 10–20)
CALCIUM SERPL-MCNC: 9.2 MG/DL — SIGNIFICANT CHANGE UP (ref 8.4–10.4)
CALCIUM SERPL-MCNC: 9.2 MG/DL — SIGNIFICANT CHANGE UP (ref 8.4–10.5)
CHLORIDE SERPL-SCNC: 101 MMOL/L — SIGNIFICANT CHANGE UP (ref 98–110)
CHLORIDE SERPL-SCNC: 105 MMOL/L — SIGNIFICANT CHANGE UP (ref 98–110)
CO2 SERPL-SCNC: 27 MMOL/L — SIGNIFICANT CHANGE UP (ref 17–32)
CO2 SERPL-SCNC: 30 MMOL/L — SIGNIFICANT CHANGE UP (ref 17–32)
CREAT SERPL-MCNC: 0.7 MG/DL — SIGNIFICANT CHANGE UP (ref 0.7–1.5)
CREAT SERPL-MCNC: 0.7 MG/DL — SIGNIFICANT CHANGE UP (ref 0.7–1.5)
EGFR: 87 ML/MIN/1.73M2 — SIGNIFICANT CHANGE UP
EGFR: 87 ML/MIN/1.73M2 — SIGNIFICANT CHANGE UP
GLUCOSE SERPL-MCNC: 114 MG/DL — HIGH (ref 70–99)
GLUCOSE SERPL-MCNC: 115 MG/DL — HIGH (ref 70–99)
MAGNESIUM SERPL-MCNC: 1.8 MG/DL — SIGNIFICANT CHANGE UP (ref 1.8–2.4)
MAGNESIUM SERPL-MCNC: 2.3 MG/DL — SIGNIFICANT CHANGE UP (ref 1.8–2.4)
POTASSIUM SERPL-MCNC: 3.6 MMOL/L — SIGNIFICANT CHANGE UP (ref 3.5–5)
POTASSIUM SERPL-MCNC: 3.9 MMOL/L — SIGNIFICANT CHANGE UP (ref 3.5–5)
POTASSIUM SERPL-SCNC: 3.6 MMOL/L — SIGNIFICANT CHANGE UP (ref 3.5–5)
POTASSIUM SERPL-SCNC: 3.9 MMOL/L — SIGNIFICANT CHANGE UP (ref 3.5–5)
SODIUM SERPL-SCNC: 143 MMOL/L — SIGNIFICANT CHANGE UP (ref 135–146)
SODIUM SERPL-SCNC: 146 MMOL/L — SIGNIFICANT CHANGE UP (ref 135–146)

## 2024-07-09 PROCEDURE — 99233 SBSQ HOSP IP/OBS HIGH 50: CPT

## 2024-07-09 RX ORDER — POTASSIUM CHLORIDE 600 MG/1
40 TABLET, FILM COATED, EXTENDED RELEASE ORAL ONCE
Refills: 0 | Status: COMPLETED | OUTPATIENT
Start: 2024-07-09 | End: 2024-07-09

## 2024-07-09 RX ORDER — MAGNESIUM SULFATE 100 %
2 POWDER (GRAM) MISCELLANEOUS ONCE
Refills: 0 | Status: DISCONTINUED | OUTPATIENT
Start: 2024-07-09 | End: 2024-07-09

## 2024-07-09 RX ORDER — MAGNESIUM SULFATE 100 %
2 POWDER (GRAM) MISCELLANEOUS ONCE
Refills: 0 | Status: COMPLETED | OUTPATIENT
Start: 2024-07-09 | End: 2024-07-09

## 2024-07-09 RX ORDER — METOPROLOL TARTRATE 50 MG
50 TABLET ORAL THREE TIMES A DAY
Refills: 0 | Status: DISCONTINUED | OUTPATIENT
Start: 2024-07-09 | End: 2024-07-13

## 2024-07-09 RX ADMIN — Medication 125 MICROGRAM(S): at 05:25

## 2024-07-09 RX ADMIN — POTASSIUM CHLORIDE 40 MILLIEQUIVALENT(S): 600 TABLET, FILM COATED, EXTENDED RELEASE ORAL at 10:30

## 2024-07-09 RX ADMIN — IRON SUCROSE 110 MILLIGRAM(S): 20 INJECTION, SOLUTION INTRAVENOUS at 12:45

## 2024-07-09 RX ADMIN — APIXABAN 5 MILLIGRAM(S): 5 TABLET, FILM COATED ORAL at 05:24

## 2024-07-09 RX ADMIN — Medication 50 MILLIGRAM(S): at 21:17

## 2024-07-09 RX ADMIN — Medication 50 MILLIGRAM(S): at 05:25

## 2024-07-09 RX ADMIN — APIXABAN 5 MILLIGRAM(S): 5 TABLET, FILM COATED ORAL at 17:14

## 2024-07-09 RX ADMIN — ATORVASTATIN CALCIUM 20 MILLIGRAM(S): 20 TABLET, FILM COATED ORAL at 21:16

## 2024-07-09 RX ADMIN — LOSARTAN POTASSIUM 100 MILLIGRAM(S): 100 TABLET, FILM COATED ORAL at 05:24

## 2024-07-09 RX ADMIN — Medication 3 MILLIGRAM(S): at 21:46

## 2024-07-09 RX ADMIN — FUROSEMIDE 40 MILLIGRAM(S): 10 INJECTION, SOLUTION INTRAMUSCULAR; INTRAVENOUS at 05:24

## 2024-07-09 RX ADMIN — Medication 25 GRAM(S): at 10:30

## 2024-07-09 RX ADMIN — Medication 50 MILLIGRAM(S): at 13:10

## 2024-07-09 RX ADMIN — FUROSEMIDE 40 MILLIGRAM(S): 10 INJECTION, SOLUTION INTRAMUSCULAR; INTRAVENOUS at 13:10

## 2024-07-09 RX ADMIN — PANTOPRAZOLE SODIUM 40 MILLIGRAM(S): 40 INJECTION, POWDER, FOR SOLUTION INTRAVENOUS at 05:28

## 2024-07-09 RX ADMIN — Medication 12.5 MILLIGRAM(S): at 21:46

## 2024-07-09 NOTE — PROGRESS NOTE ADULT - SUBJECTIVE AND OBJECTIVE BOX
AMARA UMANZOR 79y Female  MRN#: [id number]     Hospital Day: 3d    Pt is currently admitted with the primary diagnosis of  Atrial fibrillation        SUBJECTIVE     Hospital day 3. Patient seen and examined at bedside. Reports condition is improving, has no acute complaints.                                             ----------------------------------------------------------  OBJECTIVE  PAST MEDICAL & SURGICAL HISTORY  Osteoarthritis  feet    HTN (hypertension)    GERD (gastroesophageal reflux disease)    H/O insomnia    Vertigo    Hypothyroid    Cataract    Obese    History of repair of hip fracture  AURE    H/O hand surgery  WRIST   SCREW AND AURE LEFT                                              -----------------------------------------------------------  ALLERGIES:  No Known Allergies                                            ------------------------------------------------------------    HOME MEDICATIONS  Home Medications:  Eliquis 5 mg oral tablet: 1 tab(s) orally 2 times a day (30 May 2024 16:23)  furosemide 40 mg oral tablet: 1 tab(s) orally once a day (04 Jun 2024 12:50)  levothyroxine 100 mcg (0.1 mg) oral tablet: 1 tab(s) orally once a day (04 Jun 2024 12:50)  metoprolol tartrate 25 mg oral tablet: 1 tab(s) orally 2 times a day (30 May 2024 16:25)  omeprazole 40 mg oral delayed release capsule: 1 cap(s) orally once a day (30 May 2024 16:22)  Percocet 5 mg-325 mg oral tablet: 1 tab(s) orally every 6 hours as needed for  severe pain (30 May 2024 16:21)  pravastatin 40 mg oral tablet: 1 tab(s) orally once a day (30 May 2024 16:22)  Vitamin D3 50 mcg (2000 intl units) oral capsule: 1 cap(s) orally once a day (30 May 2024 16:22)                           MEDICATIONS:  STANDING MEDICATIONS  apixaban 5 milliGRAM(s) Oral every 12 hours  atorvastatin 20 milliGRAM(s) Oral at bedtime  furosemide   Injectable 40 milliGRAM(s) IV Push two times a day  iron sucrose IVPB 200 milliGRAM(s) IV Intermittent every 24 hours  levothyroxine 125 MICROGram(s) Oral daily  losartan 100 milliGRAM(s) Oral daily  magnesium sulfate  IVPB 2 Gram(s) IV Intermittent once  metoprolol tartrate 50 milliGRAM(s) Oral three times a day  pantoprazole    Tablet 40 milliGRAM(s) Oral before breakfast  potassium chloride    Tablet ER 40 milliEquivalent(s) Oral once    PRN MEDICATIONS  acetaminophen     Tablet .. 650 milliGRAM(s) Oral every 6 hours PRN  aluminum hydroxide/magnesium hydroxide/simethicone Suspension 30 milliLiter(s) Oral every 4 hours PRN  meclizine 12.5 milliGRAM(s) Oral three times a day PRN  melatonin 3 milliGRAM(s) Oral at bedtime PRN  ondansetron Injectable 4 milliGRAM(s) IV Push every 8 hours PRN  oxycodone    5 mG/acetaminophen 325 mG 1 Tablet(s) Oral every 6 hours PRN                                            ------------------------------------------------------------  VITAL SIGNS: Last 24 Hours  T(C): 36.4 (09 Jul 2024 05:02), Max: 36.9 (08 Jul 2024 13:42)  T(F): 97.6 (09 Jul 2024 05:02), Max: 98.5 (08 Jul 2024 13:42)  HR: 84 (09 Jul 2024 06:56) (84 - 97)  BP: 149/84 (09 Jul 2024 06:56) (145/78 - 178/107)  BP(mean): --  RR: 18 (09 Jul 2024 05:02) (18 - 18)  SpO2: 96% (09 Jul 2024 05:02) (96% - 97%)      07-08-24 @ 07:01  -  07-09-24 @ 07:00  --------------------------------------------------------  IN: 955 mL / OUT: 3350 mL / NET: -2395 mL                                             --------------------------------------------------------------  LABS:    07-09    146  |  105  |  15  ----------------------------<  115<H>  3.6   |  30  |  0.7    Ca    9.2      09 Jul 2024 06:45  Mg     1.8     07-09        Urinalysis Basic - ( 09 Jul 2024 06:45 )    Color: x / Appearance: x / SG: x / pH: x  Gluc: 115 mg/dL / Ketone: x  / Bili: x / Urobili: x   Blood: x / Protein: x / Nitrite: x   Leuk Esterase: x / RBC: x / WBC x   Sq Epi: x / Non Sq Epi: x / Bacteria: x                                                      -------------------------------------------------------------  RADIOLOGY:     Xray Chest 1 View-PORTABLE IMMEDIATE (07.06.24 @ 09:29)     INTERPRETATION:  Clinical History / Reason for exam: A. Fib    Comparison : Chest radiograph June 4, 2024.    Technique/Positioning: AP.    Findings:    Support devices: None.    Cardiac/mediastinum/hilum: Stable cardiomegaly.    Lung parenchyma/Pleura: Left midlung linear atelectasis.    Skeleton/soft tissues: Stable.    Impression:    No radiographic evidence of acute cardiopulmonary disease.        --- End of Report ---                                             --------------------------------------------------------------    PHYSICAL EXAM:  CHEST/LUNG: Decreased breath sounds in lower lung fields.   HEART: Irregular rhythm. No murmurs, rubs, or gallops  ABDOMEN: Soft, Nontender, Nondistended.    EXTREMITIES: 1+ LE edema   SK                                           --------------------------------------------------------------

## 2024-07-09 NOTE — PROGRESS NOTE ADULT - SUBJECTIVE AND OBJECTIVE BOX
AMARA UMANZOR  79y Female    CHIEF COMPLAINT:    Patient is a 79y old  Female who presents with a chief complaint of SOB (08 Jul 2024 10:36)      INTERVAL HPI/OVERNIGHT EVENTS:    Patient seen and examined. Pt complains that she is still having sob. Fluid overload. BP is running high    ROS: All other systems are negative.    Vital Signs:    T(F): 97.6 (07-09-24 @ 05:02), Max: 98.5 (07-08-24 @ 13:42)  HR: 84 (07-09-24 @ 06:56) (84 - 97)  BP: 149/84 (07-09-24 @ 06:56) (146/81 - 178/107)  RR: 18 (07-09-24 @ 05:02) (18 - 18)  SpO2: 93% (07-09-24 @ 10:48) (93% - 97%)  I&O's Summary    08 Jul 2024 07:01  -  09 Jul 2024 07:00  --------------------------------------------------------  IN: 955 mL / OUT: 3350 mL / NET: -2395 mL    09 Jul 2024 07:01  -  09 Jul 2024 11:18  --------------------------------------------------------  IN: 120 mL / OUT: 0 mL / NET: 120 mL      Daily     Daily   CAPILLARY BLOOD GLUCOSE          PHYSICAL EXAM:    GENERAL:  NAD  SKIN: No rashes or lesions  HENT: Atraumatic. Normocephalic. PERRL. Moist membranes.  NECK: Supple, +ve JVD. No lymphadenopathy.  PULMONARY: CTA B/L. No wheezing. No rales  CVS: Normal S1, S2. Rate and Rhythm are regular. No murmurs.  ABDOMEN/GI: Soft, Nontender, Nondistended; BS present  EXTREMITIES: Peripheral pulses intact. No edema B/L LE.  NEUROLOGIC:  No motor or sensory deficit.  PSYCH: Alert & oriented x 3    Consultant(s) Notes Reviewed:  [x ] YES  [ ] NO  Care Discussed with Consultants/Other Providers [ x] YES  [ ] NO    EKG reviewed  Telemetry reviewed    LABS:    07-09    146  |  105  |  15  ----------------------------<  115<H>  3.6   |  30  |  0.7    Ca    9.2      09 Jul 2024 06:45  Mg     1.8     07-09                RADIOLOGY & ADDITIONAL TESTS:      Imaging or report Personally Reviewed:  [ ] YES  [ ] NO    Medications:  Standing  apixaban 5 milliGRAM(s) Oral every 12 hours  atorvastatin 20 milliGRAM(s) Oral at bedtime  furosemide   Injectable 40 milliGRAM(s) IV Push two times a day  iron sucrose IVPB 200 milliGRAM(s) IV Intermittent every 24 hours  levothyroxine 125 MICROGram(s) Oral daily  losartan 100 milliGRAM(s) Oral daily  metoprolol tartrate 50 milliGRAM(s) Oral three times a day  pantoprazole    Tablet 40 milliGRAM(s) Oral before breakfast    PRN Meds  acetaminophen     Tablet .. 650 milliGRAM(s) Oral every 6 hours PRN  aluminum hydroxide/magnesium hydroxide/simethicone Suspension 30 milliLiter(s) Oral every 4 hours PRN  meclizine 12.5 milliGRAM(s) Oral three times a day PRN  melatonin 3 milliGRAM(s) Oral at bedtime PRN  ondansetron Injectable 4 milliGRAM(s) IV Push every 8 hours PRN  oxycodone    5 mG/acetaminophen 325 mG 1 Tablet(s) Oral every 6 hours PRN      Case discussed with resident    Care discussed with pt/family

## 2024-07-09 NOTE — PROGRESS NOTE ADULT - ASSESSMENT
79-year-old female history of obesity, hypothyroidism, vertigo, insomnia, GERD, hypertension, osteoarthritis, HFpEF mod TR A-fib on Eliquis, aortic stenosis, pulmonary hypertension, recent admission for shortness of breath and lower extremity edema presents to the ED accompanied by son complaining of shortness of breath worse of the past 3 to 4 days.  At night felt dizzy and lightheaded. When they called the ambulance in AM, was noted to be in A-fib RVR which resolved after 25 mg push of Cardizem.    Acute HFmrEF  CP  Long term persistent A-fib  Obesity               PLAN:    ·	Tele reviewed by me. In A-fib  ·	EKG on admission: A-fib 71/min. Q waves in V1-V2. RAD (Interpreted by me)  ·	Troponin: 20-->25-->34  ·	ECHO reviewed. EF is 45-50%  ·	Pro-BNP 1975-->4592  ·	Cardiology eval  ·	O/E pt is still fluid overload. Cont Lasix 40 mg iv q 12h  ·	A negative balance of 2395 over the last 24 hours.   ·	Check i's and o's and daily wt  ·	Low salt diet and water restriction to 1.5 L/D  ·	Monitor electrolytes. Replete Mg  ·	Serum iron is 33 and percent sat is 10. Start her on Venofer 200 mg iv daily x 5 doses  ·	On GDMT. Cont Losartan and Metoprolol Succinate. Increase Metoprolol to 50 mg po q 8h  ·	Cardiology eval noted. Cont the current management  ·	Old record reviewed. Had CCTA done 6/4/24. Extensive motion artifact.   ·	TSH is 34. Increase Synthroid to 125 mcg po daily    Progress Note Handoff    Pending (specify):  Consults________, Tests________, Test Results_______, Other__Fluid overload_____  Family discussion: Diagnosis and plan of care d/w the son   Disposition: Home___/SNF___/Other________/Unknown at this time________    Kael Lobo MD  Spectra: [phone]

## 2024-07-09 NOTE — PROGRESS NOTE ADULT - ASSESSMENT
79-year-old female history of obesity, hypothyroidism, vertigo, insomnia, GERD, hypertension, osteoarthritis, HFpEF, A-fib on Eliquis, aortic stenosis, pulmonary hypertension, recent admission for shortness of breath and lower extremity edema. Patient was noted to be in A-fib RVR in the ambulance which resolved after 25 mg push of Cardizem. Patient is being managed for acute systolic HF likely 2/2 under- diuresis.     PLAN:    #HFmrEF #A-fib with RVR   - Tele reviewed- in A.fib  - EKG on admission: A-fib 71/min. Q waves in V1-V2. RAD   - Troponin: 20-->25-->34  - ECHO reviewed. EF is 45-50%  - Pro-BNP 1975-->459  - per cardiology:   - Lasix 40 mg IV BID for goal net negative 1-2L daily. If not achieving goal, can increase to Lasix 60-80 mg IV BID.   - Once patient can be weaned off oxygen at rest and with ambulation, would start Lasix 40 mg PO BID.  - She and her son was instructed they can increase her diuretic dose to Lasix 80 mg PO BID if she develops worsening CHF symptoms.   - Check i's and o's and daily wt  - Low salt diet and water restriction to 1.5 L/D  - Monitor electrolytes. Replete K  - Serum iron is 33 and percent sat is 10. Start her on Venofer 200 mg iv daily x 5 doses  - On GDMT. C/W losartan 100 mg po daily and Metoprolol Succinate 50 mg q12hr  - Old record reviewed. Had CCTA done 6/4/24. Extensive motion artifact.     #chronic Afib on Eliquis  - CHADsVaSc 3   - Eliquis 5mg bid  - metoprolol 50mg bid    #HTN  - cw losartan 100, metoprolol 50t23ps    #Hypothyroid  - TSH elevated 12.75   - Levothyroxine 125 daily

## 2024-07-10 ENCOUNTER — TRANSCRIPTION ENCOUNTER (OUTPATIENT)
Age: 79
End: 2024-07-10

## 2024-07-10 LAB
ALBUMIN SERPL ELPH-MCNC: 4.3 G/DL — SIGNIFICANT CHANGE UP (ref 3.5–5.2)
ALP SERPL-CCNC: 80 U/L — SIGNIFICANT CHANGE UP (ref 30–115)
ALT FLD-CCNC: 10 U/L — SIGNIFICANT CHANGE UP (ref 0–41)
ANION GAP SERPL CALC-SCNC: 15 MMOL/L — HIGH (ref 7–14)
AST SERPL-CCNC: 15 U/L — SIGNIFICANT CHANGE UP (ref 0–41)
BASOPHILS # BLD AUTO: 0.07 K/UL — SIGNIFICANT CHANGE UP (ref 0–0.2)
BASOPHILS NFR BLD AUTO: 0.8 % — SIGNIFICANT CHANGE UP (ref 0–1)
BILIRUB SERPL-MCNC: 0.7 MG/DL — SIGNIFICANT CHANGE UP (ref 0.2–1.2)
BUN SERPL-MCNC: 17 MG/DL — SIGNIFICANT CHANGE UP (ref 10–20)
CALCIUM SERPL-MCNC: 9.3 MG/DL — SIGNIFICANT CHANGE UP (ref 8.4–10.5)
CHLORIDE SERPL-SCNC: 103 MMOL/L — SIGNIFICANT CHANGE UP (ref 98–110)
CO2 SERPL-SCNC: 27 MMOL/L — SIGNIFICANT CHANGE UP (ref 17–32)
CREAT SERPL-MCNC: 0.6 MG/DL — LOW (ref 0.7–1.5)
EGFR: 90 ML/MIN/1.73M2 — SIGNIFICANT CHANGE UP
EOSINOPHIL # BLD AUTO: 0.16 K/UL — SIGNIFICANT CHANGE UP (ref 0–0.7)
EOSINOPHIL NFR BLD AUTO: 1.9 % — SIGNIFICANT CHANGE UP (ref 0–8)
GLUCOSE SERPL-MCNC: 111 MG/DL — HIGH (ref 70–99)
HCT VFR BLD CALC: 48.4 % — HIGH (ref 37–47)
HGB BLD-MCNC: 14.3 G/DL — SIGNIFICANT CHANGE UP (ref 12–16)
IMM GRANULOCYTES NFR BLD AUTO: 0.2 % — SIGNIFICANT CHANGE UP (ref 0.1–0.3)
LYMPHOCYTES # BLD AUTO: 1.92 K/UL — SIGNIFICANT CHANGE UP (ref 1.2–3.4)
LYMPHOCYTES # BLD AUTO: 22.4 % — SIGNIFICANT CHANGE UP (ref 20.5–51.1)
MAGNESIUM SERPL-MCNC: 2.7 MG/DL — HIGH (ref 1.8–2.4)
MCHC RBC-ENTMCNC: 25.2 PG — LOW (ref 27–31)
MCHC RBC-ENTMCNC: 29.5 G/DL — LOW (ref 32–37)
MCV RBC AUTO: 85.2 FL — SIGNIFICANT CHANGE UP (ref 81–99)
MONOCYTES # BLD AUTO: 0.57 K/UL — SIGNIFICANT CHANGE UP (ref 0.1–0.6)
MONOCYTES NFR BLD AUTO: 6.6 % — SIGNIFICANT CHANGE UP (ref 1.7–9.3)
NEUTROPHILS # BLD AUTO: 5.85 K/UL — SIGNIFICANT CHANGE UP (ref 1.4–6.5)
NEUTROPHILS NFR BLD AUTO: 68.1 % — SIGNIFICANT CHANGE UP (ref 42.2–75.2)
NRBC # BLD: 0 /100 WBCS — SIGNIFICANT CHANGE UP (ref 0–0)
PLATELET # BLD AUTO: 267 K/UL — SIGNIFICANT CHANGE UP (ref 130–400)
PMV BLD: 9.5 FL — SIGNIFICANT CHANGE UP (ref 7.4–10.4)
POTASSIUM SERPL-MCNC: 3.8 MMOL/L — SIGNIFICANT CHANGE UP (ref 3.5–5)
POTASSIUM SERPL-SCNC: 3.8 MMOL/L — SIGNIFICANT CHANGE UP (ref 3.5–5)
PROT SERPL-MCNC: 7.4 G/DL — SIGNIFICANT CHANGE UP (ref 6–8)
RBC # BLD: 5.68 M/UL — HIGH (ref 4.2–5.4)
RBC # FLD: 23.2 % — HIGH (ref 11.5–14.5)
SODIUM SERPL-SCNC: 145 MMOL/L — SIGNIFICANT CHANGE UP (ref 135–146)
WBC # BLD: 8.59 K/UL — SIGNIFICANT CHANGE UP (ref 4.8–10.8)
WBC # FLD AUTO: 8.59 K/UL — SIGNIFICANT CHANGE UP (ref 4.8–10.8)

## 2024-07-10 PROCEDURE — 99233 SBSQ HOSP IP/OBS HIGH 50: CPT

## 2024-07-10 PROCEDURE — 71045 X-RAY EXAM CHEST 1 VIEW: CPT | Mod: 26

## 2024-07-10 RX ORDER — MECLIZINE HCL 25 MG
12.5 TABLET ORAL THREE TIMES A DAY
Refills: 0 | Status: DISCONTINUED | OUTPATIENT
Start: 2024-07-10 | End: 2024-07-11

## 2024-07-10 RX ORDER — POTASSIUM CHLORIDE 600 MG/1
40 TABLET, FILM COATED, EXTENDED RELEASE ORAL ONCE
Refills: 0 | Status: COMPLETED | OUTPATIENT
Start: 2024-07-10 | End: 2024-07-10

## 2024-07-10 RX ADMIN — APIXABAN 5 MILLIGRAM(S): 5 TABLET, FILM COATED ORAL at 05:14

## 2024-07-10 RX ADMIN — FUROSEMIDE 40 MILLIGRAM(S): 10 INJECTION, SOLUTION INTRAMUSCULAR; INTRAVENOUS at 13:04

## 2024-07-10 RX ADMIN — POTASSIUM CHLORIDE 40 MILLIEQUIVALENT(S): 600 TABLET, FILM COATED, EXTENDED RELEASE ORAL at 12:01

## 2024-07-10 RX ADMIN — Medication 125 MICROGRAM(S): at 05:14

## 2024-07-10 RX ADMIN — IRON SUCROSE 110 MILLIGRAM(S): 20 INJECTION, SOLUTION INTRAVENOUS at 10:29

## 2024-07-10 RX ADMIN — Medication 3 MILLIGRAM(S): at 21:03

## 2024-07-10 RX ADMIN — Medication 12.5 MILLIGRAM(S): at 05:14

## 2024-07-10 RX ADMIN — FUROSEMIDE 40 MILLIGRAM(S): 10 INJECTION, SOLUTION INTRAMUSCULAR; INTRAVENOUS at 05:13

## 2024-07-10 RX ADMIN — Medication 50 MILLIGRAM(S): at 05:20

## 2024-07-10 RX ADMIN — Medication 50 MILLIGRAM(S): at 21:04

## 2024-07-10 RX ADMIN — LOSARTAN POTASSIUM 100 MILLIGRAM(S): 100 TABLET, FILM COATED ORAL at 05:15

## 2024-07-10 RX ADMIN — Medication 12.5 MILLIGRAM(S): at 21:04

## 2024-07-10 RX ADMIN — APIXABAN 5 MILLIGRAM(S): 5 TABLET, FILM COATED ORAL at 17:45

## 2024-07-10 RX ADMIN — PANTOPRAZOLE SODIUM 40 MILLIGRAM(S): 40 INJECTION, POWDER, FOR SOLUTION INTRAVENOUS at 05:15

## 2024-07-10 RX ADMIN — Medication 12.5 MILLIGRAM(S): at 13:05

## 2024-07-10 RX ADMIN — ATORVASTATIN CALCIUM 20 MILLIGRAM(S): 20 TABLET, FILM COATED ORAL at 21:03

## 2024-07-10 RX ADMIN — Medication 50 MILLIGRAM(S): at 13:04

## 2024-07-10 NOTE — PROGRESS NOTE ADULT - ASSESSMENT
79-year-old female history of obesity, hypothyroidism, vertigo, insomnia, GERD, hypertension, osteoarthritis, HFpEF mod TR A-fib on Eliquis, aortic stenosis, pulmonary hypertension, recent admission for shortness of breath and lower extremity edema presents to the ED accompanied by son complaining of shortness of breath worse of the past 3 to 4 days.  At night felt dizzy and lightheaded. When they called the ambulance in AM, was noted to be in A-fib RVR which resolved after 25 mg push of Cardizem.    Acute HFmrEF  CP  Long term persistent A-fib  Obesity               PLAN:    ·	Tele reviewed by me. In A-fib  ·	EKG on admission: A-fib 71/min. Q waves in V1-V2. RAD (Interpreted by me)  ·	Troponin: 20-->25-->34  ·	ECHO reviewed. EF is 45-50%  ·	Pro-BNP 1975-->4592  ·	O/E pt is still fluid overload. Cont Lasix 40 mg iv q 12h. Will add Metolazone 5 mg po daily. First dose now  ·	A negative balance of 2395 over the last 24 hours.   ·	Check i's and o's and daily wt  ·	Low salt diet and water restriction to 1.5 L/D  ·	Monitor electrolytes.   ·	Serum iron is 33 and percent sat is 10. Start her on Venofer 200 mg iv daily x 5 doses  ·	On GDMT. Cont Losartan and Metoprolol Succinate. Increase Metoprolol to 50 mg po q 8h  ·	Cardiology eval noted. Cont the current management  ·	Old record reviewed. Had CCTA done 6/4/24. Extensive motion artifact.   ·	TSH is 34. Increase Synthroid to 125 mcg po daily    Progress Note Handoff    Pending (specify):  Consults________, Tests________, Test Results_______, Other__Fluid overload_____  Family discussion: Diagnosis and plan of care d/w the son   Disposition: Home___/SNF___/Other________/Unknown at this time________    Kael Lobo MD  Spectra: [phone]

## 2024-07-10 NOTE — DISCHARGE NOTE NURSING/CASE MANAGEMENT/SOCIAL WORK - PATIENT PORTAL LINK FT
You can access the FollowMyHealth Patient Portal offered by [facility] by registering at the following website: [email or web address]. By joining [facility]’s FollowMyHealth portal, you will also be able to view your health information using other applications (apps) compatible with our system.

## 2024-07-10 NOTE — PROGRESS NOTE ADULT - SUBJECTIVE AND OBJECTIVE BOX
AMARA UMANZOR  79y Female    CHIEF COMPLAINT:    Patient is a 79y old  Female who presents with a chief complaint of SOB (2024 10:36)      INTERVAL HPI/OVERNIGHT EVENTS:    Patient seen and examined. Pt states that she is still having sob.     ROS: All other systems are negative.    Vital Signs:    T(F): 97.9 (07-10-24 @ 05:00), Max: 97.9 (07-10-24 @ 05:00)  HR: 87 (07-10-24 @ 05:00) (84 - 88)  BP: 143/97 (07-10-24 @ 05:00) (134/103 - 148/99)  RR: 18 (07-10-24 @ 05:00) (18 - 18)  SpO2: 94% (07-10-24 @ 05:00) (94% - 94%)  I&O's Summary    2024 07:01  -  10 Jul 2024 07:00  --------------------------------------------------------  IN: 490 mL / OUT: 1050 mL / NET: -560 mL      Daily     Daily Weight in k (10 Jul 2024 05:00)  CAPILLARY BLOOD GLUCOSE          PHYSICAL EXAM:    GENERAL:  NAD  SKIN: No rashes or lesions  HENT: Atraumatic. Normocephalic. PERRL. Moist membranes.  NECK: Supple, No JVD. No lymphadenopathy.  PULMONARY: CTA B/L. No wheezing. No rales  CVS: Normal S1, S2. Rate and Rhythm are regular. No murmurs.  ABDOMEN/GI: Soft, Nontender, Nondistended; BS present  EXTREMITIES: Peripheral pulses intact. No edema B/L LE.  NEUROLOGIC:  No motor or sensory deficit.  PSYCH: Alert & oriented x 3    Consultant(s) Notes Reviewed:  [x ] YES  [ ] NO  Care Discussed with Consultants/Other Providers [ x] YES  [ ] NO    EKG reviewed  Telemetry reviewed    LABS:                        14.3   8.59  )-----------( 267      ( 10 Jul 2024 06:12 )             48.4     07-10    145  |  103  |  17  ----------------------------<  111<H>  3.8   |  27  |  0.6<L>    Ca    9.3      10 Jul 2024 06:12  Mg     2.7     07-10    TPro  7.4  /  Alb  4.3  /  TBili  0.7  /  DBili  x   /  AST  15  /  ALT  10  /  AlkPhos  80  07-10              RADIOLOGY & ADDITIONAL TESTS:      Imaging or report Personally Reviewed:  [ ] YES  [ ] NO    Medications:  Standing  apixaban 5 milliGRAM(s) Oral every 12 hours  atorvastatin 20 milliGRAM(s) Oral at bedtime  furosemide   Injectable 40 milliGRAM(s) IV Push two times a day  iron sucrose IVPB 200 milliGRAM(s) IV Intermittent every 24 hours  levothyroxine 125 MICROGram(s) Oral daily  losartan 100 milliGRAM(s) Oral daily  meclizine 12.5 milliGRAM(s) Oral three times a day  metolazone 5 milliGRAM(s) Oral daily  metoprolol tartrate 50 milliGRAM(s) Oral three times a day  pantoprazole    Tablet 40 milliGRAM(s) Oral before breakfast  potassium chloride    Tablet ER 40 milliEquivalent(s) Oral once    PRN Meds  acetaminophen     Tablet .. 650 milliGRAM(s) Oral every 6 hours PRN  aluminum hydroxide/magnesium hydroxide/simethicone Suspension 30 milliLiter(s) Oral every 4 hours PRN  melatonin 3 milliGRAM(s) Oral at bedtime PRN  ondansetron Injectable 4 milliGRAM(s) IV Push every 8 hours PRN  oxycodone    5 mG/acetaminophen 325 mG 1 Tablet(s) Oral every 6 hours PRN      Case discussed with resident    Care discussed with pt/family

## 2024-07-10 NOTE — PROGRESS NOTE ADULT - ASSESSMENT
79-year-old female history of obesity, hypothyroidism, vertigo, insomnia, GERD, hypertension, osteoarthritis, HFpEF, A-fib on Eliquis, aortic stenosis, pulmonary hypertension, recent admission for shortness of breath and lower extremity edema. Patient was noted to be in A-fib RVR in the ambulance which resolved after 25 mg push of Cardizem. Patient is being managed for acute systolic HF likely 2/2 under-diuresis.     PLAN:    #HFmrEF #A-fib with RVR   - Tele reviewed- in A.fib  - EKG on admission: A-fib 71/min. Q waves in V1-V2. RAD   - Troponin: 20-->25-->34  - ECHO reviewed. EF is 45-50%  - Pro-BNP 1975-->459  - per cardiology:   - Lasix 40 mg IV BID for goal net negative 1-2L daily. If not achieving goal, can increase to Lasix 60-80 mg IV BID.   - Once patient can be weaned off oxygen at rest and with ambulation, would start Lasix 40 mg PO BID.  - She and her son was instructed they can increase her diuretic dose to Lasix 80 mg PO BID if she develops worsening CHF symptoms.   - Check i's and o's and daily wt  - Low salt diet and water restriction to 1.5 L/D  - Monitor electrolytes. Replete K  - SC/W Venofer 200 mg iv daily x 5 doses  - On GDMT. C/W losartan 100 mg po daily and Metoprolol Succinate 50 mg q12hr  - Old record reviewed. Had CCTA done 6/4/24. Extensive motion artifact.   - F/U CXR   - Start     #chronic Afib on Eliquis  - CHADsVaSc 3   - Eliquis 5mg bid  - metoprolol 50mg bid    #HTN  - cw losartan 100, metoprolol [id number]    #Hypothyroid  - TSH elevated 12.75   - Levothyroxine 125 daily      79-year-old female history of obesity, hypothyroidism, vertigo, insomnia, GERD, hypertension, osteoarthritis, HFpEF, A-fib on Eliquis, aortic stenosis, pulmonary hypertension, recent admission for shortness of breath and lower extremity edema. Patient was noted to be in A-fib RVR in the ambulance which resolved after 25 mg push of Cardizem. Patient is being managed for acute systolic HF likely 2/2 under-diuresis.     PLAN:    #HFmrEF #A-fib with RVR   - Tele reviewed- in A.fib  - EKG on admission: A-fib 71/min. Q waves in V1-V2. RAD   - Troponin: 20-->25-->34  - ECHO reviewed. EF is 45-50%  - Pro-BNP 1975-->459  - per cardiology:   - Lasix 40 mg IV BID for goal net negative 1-2L daily. If not achieving goal, can increase to Lasix 60-80 mg IV BID.   - Once patient can be weaned off oxygen at rest and with ambulation, would start Lasix 40 mg PO BID.  - She and her son was instructed they can increase her diuretic dose to Lasix 80 mg PO BID if she develops worsening CHF symptoms.   - Check i's and o's and daily wt  - Low salt diet and water restriction to 1.5 L/D  - Monitor electrolytes. Replete K if below 4.   - SC/W Venofer 200 mg iv daily x 5 doses  - On GDMT. C/W losartan 100 mg po daily and Metoprolol Succinate 50 mg q12hr  - Old record reviewed. Had CCTA done 6/4/24. Extensive motion artifact.   - F/U CXR   - Start with metolazone to improve diuresis   - Observe oxygen saturation on room air     #chronic Afib on Eliquis  - CHADsVaSc 3   - Eliquis 5mg bid  - metoprolol 50mg bid    #HTN  - cw losartan 100, metoprolol [id number]    #Hypothyroid  - TSH elevated 12.75   - Levothyroxine 125 daily

## 2024-07-10 NOTE — PROGRESS NOTE ADULT - SUBJECTIVE AND OBJECTIVE BOX
AMARA UMANZOR 79y Female  MRN#: [id number]     Hospital Day: 4d    Pt is currently admitted with the primary diagnosis of  Atrial fibrillation        SUBJECTIVE     Hospital day 4. No overnight events. Patient reports she is still experiencing shortness of breath. Otherwise, not acute complainants                                             ----------------------------------------------------------  OBJECTIVE  PAST MEDICAL & SURGICAL HISTORY  Osteoarthritis  feet    HTN (hypertension)    GERD (gastroesophageal reflux disease)    H/O insomnia    Vertigo    Hypothyroid    Cataract    Obese    History of repair of hip fracture  AURE    H/O hand surgery  WRIST   SCREW AND AURE LEFT                                              -----------------------------------------------------------  ALLERGIES:  No Known Allergies                                            ------------------------------------------------------------    HOME MEDICATIONS  Home Medications:  Eliquis 5 mg oral tablet: 1 tab(s) orally 2 times a day (30 May 2024 16:23)  furosemide 40 mg oral tablet: 1 tab(s) orally once a day (04 Jun 2024 12:50)  levothyroxine 100 mcg (0.1 mg) oral tablet: 1 tab(s) orally once a day (04 Jun 2024 12:50)  metoprolol tartrate 25 mg oral tablet: 1 tab(s) orally 2 times a day (30 May 2024 16:25)  omeprazole 40 mg oral delayed release capsule: 1 cap(s) orally once a day (30 May 2024 16:22)  Percocet 5 mg-325 mg oral tablet: 1 tab(s) orally every 6 hours as needed for  severe pain (30 May 2024 16:21)  pravastatin 40 mg oral tablet: 1 tab(s) orally once a day (30 May 2024 16:22)  Vitamin D3 50 mcg (2000 intl units) oral capsule: 1 cap(s) orally once a day (30 May 2024 16:22)                           MEDICATIONS:  STANDING MEDICATIONS  apixaban 5 milliGRAM(s) Oral every 12 hours  atorvastatin 20 milliGRAM(s) Oral at bedtime  furosemide   Injectable 40 milliGRAM(s) IV Push two times a day  iron sucrose IVPB 200 milliGRAM(s) IV Intermittent every 24 hours  levothyroxine 125 MICROGram(s) Oral daily  losartan 100 milliGRAM(s) Oral daily  meclizine 12.5 milliGRAM(s) Oral three times a day  metolazone 5 milliGRAM(s) Oral daily  metoprolol tartrate 50 milliGRAM(s) Oral three times a day  pantoprazole    Tablet 40 milliGRAM(s) Oral before breakfast  potassium chloride    Tablet ER 40 milliEquivalent(s) Oral once    PRN MEDICATIONS  acetaminophen     Tablet .. 650 milliGRAM(s) Oral every 6 hours PRN  aluminum hydroxide/magnesium hydroxide/simethicone Suspension 30 milliLiter(s) Oral every 4 hours PRN  melatonin 3 milliGRAM(s) Oral at bedtime PRN  ondansetron Injectable 4 milliGRAM(s) IV Push every 8 hours PRN  oxycodone    5 mG/acetaminophen 325 mG 1 Tablet(s) Oral every 6 hours PRN                                            ------------------------------------------------------------  VITAL SIGNS: Last 24 Hours  T(C): 36.6 (10 Jul 2024 05:00), Max: 36.6 (09 Jul 2024 20:11)  T(F): 97.9 (10 Jul 2024 05:00), Max: 97.9 (10 Jul 2024 05:00)  HR: 87 (10 Jul 2024 05:00) (84 - 88)  BP: 143/97 (10 Jul 2024 05:00) (134/103 - 148/99)  BP(mean): --  RR: 18 (10 Jul 2024 05:00) (18 - 18)  SpO2: 94% (10 Jul 2024 05:00) (93% - 94%)      07-09-24 @ 07:01  -  07-10-24 @ 07:00  --------------------------------------------------------  IN: 490 mL / OUT: 1050 mL / NET: -560 mL                                             --------------------------------------------------------------  LABS:                        14.3   8.59  )-----------( 267      ( 10 Jul 2024 06:12 )             48.4     07-10    145  |  103  |  17  ----------------------------<  111<H>  3.8   |  27  |  0.6<L>    Ca    9.3      10 Jul 2024 06:12  Mg     2.7     07-10    TPro  7.4  /  Alb  4.3  /  TBili  0.7  /  DBili  x   /  AST  15  /  ALT  10  /  AlkPhos  80  07-10      Urinalysis Basic - ( 10 Jul 2024 06:12 )    Color: x / Appearance: x / SG: x / pH: x  Gluc: 111 mg/dL / Ketone: x  / Bili: x / Urobili: x   Blood: x / Protein: x / Nitrite: x   Leuk Esterase: x / RBC: x / WBC x   Sq Epi: x / Non Sq Epi: x / Bacteria: x                                                            -------------------------------------------------------------  RADIOLOGY:                                            --------------------------------------------------------------    PHYSICAL EXAM:  GENERAL: NAD, lying in bed comfortably  HEAD:  Atraumatic, Normocephalic  EYES: EOMI, conjunctiva and sclera clear  ENT: Moist mucous membranes  NECK: Supple, No JVD  CHEST/LUNG: Clear to auscultation bilaterally; No rales, rhonchi, wheezing, or rubs. Unlabored respirations  HEART: regular rate and rhythm; No murmurs, rubs, or gallops  ABDOMEN: Bowel sounds present; Soft, Nontender, Nondistended.    EXTREMITIES: Warm. No clubbing, cyanosis, or edema  NERVOUS SYSTEM:  Alert & Oriented X3. No focal deficits   SKIN: No rashes or lesions                                           --------------------------------------------------------------                 AMARA UMANZOR 79y Female  MRN#: [id number]     Hospital Day: 4d    Pt is currently admitted with the primary diagnosis of  Atrial fibrillation        SUBJECTIVE     Hospital day 4. No overnight events. Patient reports she is still experiencing shortness of breath. Otherwise, not acute complainants                                             ----------------------------------------------------------  OBJECTIVE  PAST MEDICAL & SURGICAL HISTORY  Osteoarthritis  feet    HTN (hypertension)    GERD (gastroesophageal reflux disease)    H/O insomnia    Vertigo    Hypothyroid    Cataract    Obese    History of repair of hip fracture  AURE    H/O hand surgery  WRIST   SCREW AND AURE LEFT                                              -----------------------------------------------------------  ALLERGIES:  No Known Allergies                                            ------------------------------------------------------------    HOME MEDICATIONS  Home Medications:  Eliquis 5 mg oral tablet: 1 tab(s) orally 2 times a day (30 May 2024 16:23)  furosemide 40 mg oral tablet: 1 tab(s) orally once a day (04 Jun 2024 12:50)  levothyroxine 100 mcg (0.1 mg) oral tablet: 1 tab(s) orally once a day (04 Jun 2024 12:50)  metoprolol tartrate 25 mg oral tablet: 1 tab(s) orally 2 times a day (30 May 2024 16:25)  omeprazole 40 mg oral delayed release capsule: 1 cap(s) orally once a day (30 May 2024 16:22)  Percocet 5 mg-325 mg oral tablet: 1 tab(s) orally every 6 hours as needed for  severe pain (30 May 2024 16:21)  pravastatin 40 mg oral tablet: 1 tab(s) orally once a day (30 May 2024 16:22)  Vitamin D3 50 mcg (2000 intl units) oral capsule: 1 cap(s) orally once a day (30 May 2024 16:22)                           MEDICATIONS:  STANDING MEDICATIONS  apixaban 5 milliGRAM(s) Oral every 12 hours  atorvastatin 20 milliGRAM(s) Oral at bedtime  furosemide   Injectable 40 milliGRAM(s) IV Push two times a day  iron sucrose IVPB 200 milliGRAM(s) IV Intermittent every 24 hours  levothyroxine 125 MICROGram(s) Oral daily  losartan 100 milliGRAM(s) Oral daily  meclizine 12.5 milliGRAM(s) Oral three times a day  metolazone 5 milliGRAM(s) Oral daily  metoprolol tartrate 50 milliGRAM(s) Oral three times a day  pantoprazole    Tablet 40 milliGRAM(s) Oral before breakfast  potassium chloride    Tablet ER 40 milliEquivalent(s) Oral once    PRN MEDICATIONS  acetaminophen     Tablet .. 650 milliGRAM(s) Oral every 6 hours PRN  aluminum hydroxide/magnesium hydroxide/simethicone Suspension 30 milliLiter(s) Oral every 4 hours PRN  melatonin 3 milliGRAM(s) Oral at bedtime PRN  ondansetron Injectable 4 milliGRAM(s) IV Push every 8 hours PRN  oxycodone    5 mG/acetaminophen 325 mG 1 Tablet(s) Oral every 6 hours PRN                                            ------------------------------------------------------------  VITAL SIGNS: Last 24 Hours  T(C): 36.6 (10 Jul 2024 05:00), Max: 36.6 (09 Jul 2024 20:11)  T(F): 97.9 (10 Jul 2024 05:00), Max: 97.9 (10 Jul 2024 05:00)  HR: 87 (10 Jul 2024 05:00) (84 - 88)  BP: 143/97 (10 Jul 2024 05:00) (134/103 - 148/99)  BP(mean): --  RR: 18 (10 Jul 2024 05:00) (18 - 18)  SpO2: 94% (10 Jul 2024 05:00) (93% - 94%)      07-09-24 @ 07:01  -  07-10-24 @ 07:00  --------------------------------------------------------  IN: 490 mL / OUT: 1050 mL / NET: -560 mL                                             --------------------------------------------------------------  LABS:                        14.3   8.59  )-----------( 267      ( 10 Jul 2024 06:12 )             48.4     07-10    145  |  103  |  17  ----------------------------<  111<H>  3.8   |  27  |  0.6<L>    Ca    9.3      10 Jul 2024 06:12  Mg     2.7     07-10    TPro  7.4  /  Alb  4.3  /  TBili  0.7  /  DBili  x   /  AST  15  /  ALT  10  /  AlkPhos  80  07-10      Urinalysis Basic - ( 10 Jul 2024 06:12 )    Color: x / Appearance: x / SG: x / pH: x  Gluc: 111 mg/dL / Ketone: x  / Bili: x / Urobili: x   Blood: x / Protein: x / Nitrite: x   Leuk Esterase: x / RBC: x / WBC x   Sq Epi: x / Non Sq Epi: x / Bacteria: x              PHYSICAL EXAM:  GENERAL: NAD, lying in bed comfortably  NECK: Supple, No JVD  CHEST/LUNG: Clear to auscultation bilaterally; No rales, rhonchi, wheezing, or rubs. Unlabored respirations  HEART: regular rate and rhythm; No murmurs, rubs, or gallops  ABDOMEN:Soft, Nontender, Nondistended.    EXTREMITIES:  1+ B/L LE edema.   NERVOUS SYSTEM:  Alert & Oriented X3. No focal deficits   SKIN: No rashes or lesions                                           --------------------------------------------------------------

## 2024-07-11 LAB
ALBUMIN SERPL ELPH-MCNC: 4.3 G/DL — SIGNIFICANT CHANGE UP (ref 3.5–5.2)
ALP SERPL-CCNC: 83 U/L — SIGNIFICANT CHANGE UP (ref 30–115)
ALT FLD-CCNC: 11 U/L — SIGNIFICANT CHANGE UP (ref 0–41)
ANION GAP SERPL CALC-SCNC: 15 MMOL/L — HIGH (ref 7–14)
AST SERPL-CCNC: 17 U/L — SIGNIFICANT CHANGE UP (ref 0–41)
BASOPHILS # BLD AUTO: 0.07 K/UL — SIGNIFICANT CHANGE UP (ref 0–0.2)
BASOPHILS NFR BLD AUTO: 0.8 % — SIGNIFICANT CHANGE UP (ref 0–1)
BILIRUB SERPL-MCNC: 0.7 MG/DL — SIGNIFICANT CHANGE UP (ref 0.2–1.2)
BUN SERPL-MCNC: 25 MG/DL — HIGH (ref 10–20)
CALCIUM SERPL-MCNC: 9.9 MG/DL — SIGNIFICANT CHANGE UP (ref 8.4–10.5)
CHLORIDE SERPL-SCNC: 98 MMOL/L — SIGNIFICANT CHANGE UP (ref 98–110)
CO2 SERPL-SCNC: 32 MMOL/L — SIGNIFICANT CHANGE UP (ref 17–32)
CREAT SERPL-MCNC: 0.8 MG/DL — SIGNIFICANT CHANGE UP (ref 0.7–1.5)
EGFR: 74 ML/MIN/1.73M2 — SIGNIFICANT CHANGE UP
EOSINOPHIL # BLD AUTO: 0.23 K/UL — SIGNIFICANT CHANGE UP (ref 0–0.7)
EOSINOPHIL NFR BLD AUTO: 2.5 % — SIGNIFICANT CHANGE UP (ref 0–8)
GLUCOSE SERPL-MCNC: 97 MG/DL — SIGNIFICANT CHANGE UP (ref 70–99)
HCT VFR BLD CALC: 51.2 % — HIGH (ref 37–47)
HGB BLD-MCNC: 15.1 G/DL — SIGNIFICANT CHANGE UP (ref 12–16)
IMM GRANULOCYTES NFR BLD AUTO: 0.1 % — SIGNIFICANT CHANGE UP (ref 0.1–0.3)
LYMPHOCYTES # BLD AUTO: 2.67 K/UL — SIGNIFICANT CHANGE UP (ref 1.2–3.4)
LYMPHOCYTES # BLD AUTO: 28.7 % — SIGNIFICANT CHANGE UP (ref 20.5–51.1)
MAGNESIUM SERPL-MCNC: 2.2 MG/DL — SIGNIFICANT CHANGE UP (ref 1.8–2.4)
MCHC RBC-ENTMCNC: 25.5 PG — LOW (ref 27–31)
MCHC RBC-ENTMCNC: 29.5 G/DL — LOW (ref 32–37)
MCV RBC AUTO: 86.3 FL — SIGNIFICANT CHANGE UP (ref 81–99)
MONOCYTES # BLD AUTO: 0.65 K/UL — HIGH (ref 0.1–0.6)
MONOCYTES NFR BLD AUTO: 7 % — SIGNIFICANT CHANGE UP (ref 1.7–9.3)
NEUTROPHILS # BLD AUTO: 5.68 K/UL — SIGNIFICANT CHANGE UP (ref 1.4–6.5)
NEUTROPHILS NFR BLD AUTO: 60.9 % — SIGNIFICANT CHANGE UP (ref 42.2–75.2)
NRBC # BLD: 0 /100 WBCS — SIGNIFICANT CHANGE UP (ref 0–0)
PLATELET # BLD AUTO: 308 K/UL — SIGNIFICANT CHANGE UP (ref 130–400)
PMV BLD: 10.1 FL — SIGNIFICANT CHANGE UP (ref 7.4–10.4)
POTASSIUM SERPL-MCNC: 3.8 MMOL/L — SIGNIFICANT CHANGE UP (ref 3.5–5)
POTASSIUM SERPL-SCNC: 3.8 MMOL/L — SIGNIFICANT CHANGE UP (ref 3.5–5)
PROT SERPL-MCNC: 7.4 G/DL — SIGNIFICANT CHANGE UP (ref 6–8)
RBC # BLD: 5.93 M/UL — HIGH (ref 4.2–5.4)
RBC # FLD: 23.6 % — HIGH (ref 11.5–14.5)
SODIUM SERPL-SCNC: 145 MMOL/L — SIGNIFICANT CHANGE UP (ref 135–146)
WBC # BLD: 9.31 K/UL — SIGNIFICANT CHANGE UP (ref 4.8–10.8)
WBC # FLD AUTO: 9.31 K/UL — SIGNIFICANT CHANGE UP (ref 4.8–10.8)

## 2024-07-11 PROCEDURE — 99232 SBSQ HOSP IP/OBS MODERATE 35: CPT

## 2024-07-11 RX ORDER — SENNOSIDES 8.6 MG
2 TABLET ORAL AT BEDTIME
Refills: 0 | Status: DISCONTINUED | OUTPATIENT
Start: 2024-07-11 | End: 2024-07-13

## 2024-07-11 RX ORDER — POLYETHYLENE GLYCOL 3350 1 G/G
17 POWDER ORAL DAILY
Refills: 0 | Status: DISCONTINUED | OUTPATIENT
Start: 2024-07-11 | End: 2024-07-13

## 2024-07-11 RX ADMIN — PANTOPRAZOLE SODIUM 40 MILLIGRAM(S): 40 INJECTION, POWDER, FOR SOLUTION INTRAVENOUS at 05:15

## 2024-07-11 RX ADMIN — Medication 50 MILLIGRAM(S): at 22:20

## 2024-07-11 RX ADMIN — Medication 125 MICROGRAM(S): at 05:15

## 2024-07-11 RX ADMIN — Medication 12.5 MILLIGRAM(S): at 05:17

## 2024-07-11 RX ADMIN — FUROSEMIDE 40 MILLIGRAM(S): 10 INJECTION, SOLUTION INTRAMUSCULAR; INTRAVENOUS at 05:15

## 2024-07-11 RX ADMIN — Medication 2 TABLET(S): at 22:19

## 2024-07-11 RX ADMIN — APIXABAN 5 MILLIGRAM(S): 5 TABLET, FILM COATED ORAL at 17:22

## 2024-07-11 RX ADMIN — Medication 50 MILLIGRAM(S): at 13:09

## 2024-07-11 RX ADMIN — APIXABAN 5 MILLIGRAM(S): 5 TABLET, FILM COATED ORAL at 05:15

## 2024-07-11 RX ADMIN — ATORVASTATIN CALCIUM 20 MILLIGRAM(S): 20 TABLET, FILM COATED ORAL at 22:20

## 2024-07-11 RX ADMIN — POLYETHYLENE GLYCOL 3350 17 GRAM(S): 1 POWDER ORAL at 13:09

## 2024-07-11 RX ADMIN — Medication 50 MILLIGRAM(S): at 05:16

## 2024-07-11 RX ADMIN — LOSARTAN POTASSIUM 100 MILLIGRAM(S): 100 TABLET, FILM COATED ORAL at 05:15

## 2024-07-11 RX ADMIN — IRON SUCROSE 110 MILLIGRAM(S): 20 INJECTION, SOLUTION INTRAVENOUS at 10:34

## 2024-07-11 RX ADMIN — Medication 3 MILLIGRAM(S): at 22:24

## 2024-07-11 NOTE — PROGRESS NOTE ADULT - SUBJECTIVE AND OBJECTIVE BOX
Hospital Day:  5d    Subjective:    Patient is a 79y old  Female who presents with a chief complaint of SOB. Pt was seen and evaluated at bedside. No acute events overnight. Trial of am      Admitted to medicine for a primary diagnosis of     Past Medical Hx:   Osteoarthritis    HTN (hypertension)    GERD (gastroesophageal reflux disease)    H/O insomnia    Vertigo    Hypothyroid    Cataract    Obese      Past Sx:  History of repair of hip fracture    H/O hand surgery      Allergies:  No Known Allergies    Current Meds:   Standng Meds:  apixaban 5 milliGRAM(s) Oral every 12 hours  atorvastatin 20 milliGRAM(s) Oral at bedtime  iron sucrose IVPB 200 milliGRAM(s) IV Intermittent every 24 hours  levothyroxine 125 MICROGram(s) Oral daily  losartan 100 milliGRAM(s) Oral daily  metolazone 5 milliGRAM(s) Oral daily  metoprolol tartrate 50 milliGRAM(s) Oral three times a day  pantoprazole    Tablet 40 milliGRAM(s) Oral before breakfast  polyethylene glycol 3350 17 Gram(s) Oral daily  senna 2 Tablet(s) Oral at bedtime    PRN Meds:  acetaminophen     Tablet .. 650 milliGRAM(s) Oral every 6 hours PRN Temp greater or equal to 38C (100.4F), Mild Pain (1 - 3)  aluminum hydroxide/magnesium hydroxide/simethicone Suspension 30 milliLiter(s) Oral every 4 hours PRN Dyspepsia  melatonin 3 milliGRAM(s) Oral at bedtime PRN Insomnia  ondansetron Injectable 4 milliGRAM(s) IV Push every 8 hours PRN Nausea and/or Vomiting  oxycodone    5 mG/acetaminophen 325 mG 1 Tablet(s) Oral every 6 hours PRN for severe pain    HOME MEDICATIONS:  Eliquis 5 mg oral tablet: 1 tab(s) orally 2 times a day  furosemide 40 mg oral tablet: 1 tab(s) orally once a day  levothyroxine 100 mcg (0.1 mg) oral tablet: 1 tab(s) orally once a day  metoprolol tartrate 25 mg oral tablet: 1 tab(s) orally 2 times a day  omeprazole 40 mg oral delayed release capsule: 1 cap(s) orally once a day  Percocet 5 mg-325 mg oral tablet: 1 tab(s) orally every 6 hours as needed for  severe pain  pravastatin 40 mg oral tablet: 1 tab(s) orally once a day  Vitamin D3 50 mcg (2000 intl units) oral capsule: 1 cap(s) orally once a day      Vital Signs:   T(F): 97.6 (07-11-24 @ 13:53), Max: 97.8 (07-11-24 @ 05:00)  HR: 90 (07-11-24 @ 13:53) (85 - 94)  BP: 114/81 (07-11-24 @ 13:53) (114/81 - 137/78)  RR: 18 (07-11-24 @ 13:53) (18 - 20)  SpO2: 94% (07-11-24 @ 12:56) (93% - 96%)      07-10-24 @ 07:01  -  07-11-24 @ 07:00  --------------------------------------------------------  IN: 892 mL / OUT: 1650 mL / NET: -758 mL    07-11-24 @ 07:01  -  07-11-24 @ 14:01  --------------------------------------------------------  IN: 526 mL / OUT: 1800 mL / NET: -1274 mL        Physical Exam:   GENERAL: NAD  HEENT: NCAT  CHEST/LUNG: CTAB  HEART: Regular rate and rhythm; s1 s2 appreciated, No murmurs, rubs, or gallops  ABDOMEN: Soft, Nontender, Nondistended; Bowel sounds present  EXTREMITIES: No LE edema b/l  SKIN: no rashes, no new lesions  NERVOUS SYSTEM:  Alert & Oriented X3  LINES/CATHETERS:        Labs:                         15.1   9.31  )-----------( 308      ( 11 Jul 2024 06:50 )             51.2     Neutophil% 60.9, Lymphocyte% 28.7, Monocyte% 7.0, Bands% 0.1 07-11-24 @ 06:50    11 Jul 2024 06:50    145    |  98     |  25     ----------------------------<  97     3.8     |  32     |  0.8      Ca    9.9        11 Jul 2024 06:50  Mg     2.2       11 Jul 2024 06:50    TPro  7.4    /  Alb  4.3    /  TBili  0.7    /  DBili  x      /  AST  17     /  ALT  11     /  AlkPhos  83     11 Jul 2024 06:50                    Urinalysis Basic - ( 11 Jul 2024 06:50 )    Color: x / Appearance: x / SG: x / pH: x  Gluc: 97 mg/dL / Ketone: x  / Bili: x / Urobili: x   Blood: x / Protein: x / Nitrite: x   Leuk Esterase: x / RBC: x / WBC x   Sq Epi: x / Non Sq Epi: x / Bacteria: x             Hospital Day:  5d    Subjective:    Patient is a 79y old  Female who presents with a chief complaint of SOB. Pt was seen and evaluated at bedside. No acute events overnight. Trial of am      Admitted to medicine for a primary diagnosis of     Past Medical Hx:   Osteoarthritis    HTN (hypertension)    GERD (gastroesophageal reflux disease)    H/O insomnia    Vertigo    Hypothyroid    Cataract    Obese      Past Sx:  History of repair of hip fracture    H/O hand surgery      Allergies:  No Known Allergies    Current Meds:   Standng Meds:  apixaban 5 milliGRAM(s) Oral every 12 hours  atorvastatin 20 milliGRAM(s) Oral at bedtime  iron sucrose IVPB 200 milliGRAM(s) IV Intermittent every 24 hours  levothyroxine 125 MICROGram(s) Oral daily  losartan 100 milliGRAM(s) Oral daily  metolazone 5 milliGRAM(s) Oral daily  metoprolol tartrate 50 milliGRAM(s) Oral three times a day  pantoprazole    Tablet 40 milliGRAM(s) Oral before breakfast  polyethylene glycol 3350 17 Gram(s) Oral daily  senna 2 Tablet(s) Oral at bedtime    PRN Meds:  acetaminophen     Tablet .. 650 milliGRAM(s) Oral every 6 hours PRN Temp greater or equal to 38C (100.4F), Mild Pain (1 - 3)  aluminum hydroxide/magnesium hydroxide/simethicone Suspension 30 milliLiter(s) Oral every 4 hours PRN Dyspepsia  melatonin 3 milliGRAM(s) Oral at bedtime PRN Insomnia  ondansetron Injectable 4 milliGRAM(s) IV Push every 8 hours PRN Nausea and/or Vomiting  oxycodone    5 mG/acetaminophen 325 mG 1 Tablet(s) Oral every 6 hours PRN for severe pain    HOME MEDICATIONS:  Eliquis 5 mg oral tablet: 1 tab(s) orally 2 times a day  furosemide 40 mg oral tablet: 1 tab(s) orally once a day  levothyroxine 100 mcg (0.1 mg) oral tablet: 1 tab(s) orally once a day  metoprolol tartrate 25 mg oral tablet: 1 tab(s) orally 2 times a day  omeprazole 40 mg oral delayed release capsule: 1 cap(s) orally once a day  Percocet 5 mg-325 mg oral tablet: 1 tab(s) orally every 6 hours as needed for  severe pain  pravastatin 40 mg oral tablet: 1 tab(s) orally once a day  Vitamin D3 50 mcg (2000 intl units) oral capsule: 1 cap(s) orally once a day      Vital Signs:   T(F): 97.6 (07-11-24 @ 13:53), Max: 97.8 (07-11-24 @ 05:00)  HR: 90 (07-11-24 @ 13:53) (85 - 94)  BP: 114/81 (07-11-24 @ 13:53) (114/81 - 137/78)  RR: 18 (07-11-24 @ 13:53) (18 - 20)  SpO2: 94% (07-11-24 @ 12:56) (93% - 96%)      07-10-24 @ 07:01  -  07-11-24 @ 07:00  --------------------------------------------------------  IN: 892 mL / OUT: 1650 mL / NET: -758 mL    07-11-24 @ 07:01  -  07-11-24 @ 14:01  --------------------------------------------------------  IN: 526 mL / OUT: 1800 mL / NET: -1274 mL        Physical Exam:   GENERAL: NAD, lying in bed comfortably  NECK: Supple, No JVD  CHEST/LUNG: Clear to auscultation bilaterally; No rales, rhonchi, wheezing, or rubs. Unlabored respirations  HEART: regular rate and rhythm; No murmurs, rubs, or gallops  ABDOMEN:Soft, Nontender, Nondistended.    EXTREMITIES:  1+ B/L LE edema.   NERVOUS SYSTEM:  Alert & Oriented X3. No focal deficits   SKIN: No rashes or lesions          Labs:                         15.1   9.31  )-----------( 308      ( 11 Jul 2024 06:50 )             51.2     Neutophil% 60.9, Lymphocyte% 28.7, Monocyte% 7.0, Bands% 0.1 07-11-24 @ 06:50    11 Jul 2024 06:50    145    |  98     |  25     ----------------------------<  97     3.8     |  32     |  0.8      Ca    9.9        11 Jul 2024 06:50  Mg     2.2       11 Jul 2024 06:50    TPro  7.4    /  Alb  4.3    /  TBili  0.7    /  DBili  x      /  AST  17     /  ALT  11     /  AlkPhos  83     11 Jul 2024 06:50                    Urinalysis Basic - ( 11 Jul 2024 06:50 )    Color: x / Appearance: x / SG: x / pH: x  Gluc: 97 mg/dL / Ketone: x  / Bili: x / Urobili: x   Blood: x / Protein: x / Nitrite: x   Leuk Esterase: x / RBC: x / WBC x   Sq Epi: x / Non Sq Epi: x / Bacteria: x             Hospital Day:  5d    Subjective:    Patient is a 79y old  Female who presents with a chief complaint of SOB. Pt was seen and evaluated at bedside. No acute events overnight. Trial of ambulating today on RA.       Admitted to medicine for a primary diagnosis of SOB    Past Medical Hx:   Osteoarthritis    HTN (hypertension)    GERD (gastroesophageal reflux disease)    H/O insomnia    Vertigo    Hypothyroid    Cataract    Obese      Past Sx:  History of repair of hip fracture    H/O hand surgery      Allergies:  No Known Allergies    Current Meds:   Standng Meds:  apixaban 5 milliGRAM(s) Oral every 12 hours  atorvastatin 20 milliGRAM(s) Oral at bedtime  iron sucrose IVPB 200 milliGRAM(s) IV Intermittent every 24 hours  levothyroxine 125 MICROGram(s) Oral daily  losartan 100 milliGRAM(s) Oral daily  metolazone 5 milliGRAM(s) Oral daily  metoprolol tartrate 50 milliGRAM(s) Oral three times a day  pantoprazole    Tablet 40 milliGRAM(s) Oral before breakfast  polyethylene glycol 3350 17 Gram(s) Oral daily  senna 2 Tablet(s) Oral at bedtime    PRN Meds:  acetaminophen     Tablet .. 650 milliGRAM(s) Oral every 6 hours PRN Temp greater or equal to 38C (100.4F), Mild Pain (1 - 3)  aluminum hydroxide/magnesium hydroxide/simethicone Suspension 30 milliLiter(s) Oral every 4 hours PRN Dyspepsia  melatonin 3 milliGRAM(s) Oral at bedtime PRN Insomnia  ondansetron Injectable 4 milliGRAM(s) IV Push every 8 hours PRN Nausea and/or Vomiting  oxycodone    5 mG/acetaminophen 325 mG 1 Tablet(s) Oral every 6 hours PRN for severe pain    HOME MEDICATIONS:  Eliquis 5 mg oral tablet: 1 tab(s) orally 2 times a day  furosemide 40 mg oral tablet: 1 tab(s) orally once a day  levothyroxine 100 mcg (0.1 mg) oral tablet: 1 tab(s) orally once a day  metoprolol tartrate 25 mg oral tablet: 1 tab(s) orally 2 times a day  omeprazole 40 mg oral delayed release capsule: 1 cap(s) orally once a day  Percocet 5 mg-325 mg oral tablet: 1 tab(s) orally every 6 hours as needed for  severe pain  pravastatin 40 mg oral tablet: 1 tab(s) orally once a day  Vitamin D3 50 mcg (2000 intl units) oral capsule: 1 cap(s) orally once a day      Vital Signs:   T(F): 97.6 (07-11-24 @ 13:53), Max: 97.8 (07-11-24 @ 05:00)  HR: 90 (07-11-24 @ 13:53) (85 - 94)  BP: 114/81 (07-11-24 @ 13:53) (114/81 - 137/78)  RR: 18 (07-11-24 @ 13:53) (18 - 20)  SpO2: 94% (07-11-24 @ 12:56) (93% - 96%)      07-10-24 @ 07:01  -  07-11-24 @ 07:00  --------------------------------------------------------  IN: 892 mL / OUT: 1650 mL / NET: -758 mL    07-11-24 @ 07:01  -  07-11-24 @ 14:01  --------------------------------------------------------  IN: 526 mL / OUT: 1800 mL / NET: -1274 mL        Physical Exam:   GENERAL: NAD, lying in bed comfortably  NECK: Supple, No JVD  CHEST/LUNG: Clear to auscultation bilaterally; No rales, rhonchi, wheezing, or rubs. Unlabored respirations  HEART: regular rate and rhythm; No murmurs, rubs, or gallops  ABDOMEN:Soft, Nontender, Nondistended.    EXTREMITIES:  1+ B/L LE edema.   NERVOUS SYSTEM:  Alert & Oriented X3. No focal deficits   SKIN: No rashes or lesions          Labs:                         15.1   9.31  )-----------( 308      ( 11 Jul 2024 06:50 )             51.2     Neutophil% 60.9, Lymphocyte% 28.7, Monocyte% 7.0, Bands% 0.1 07-11-24 @ 06:50    11 Jul 2024 06:50    145    |  98     |  25     ----------------------------<  97     3.8     |  32     |  0.8      Ca    9.9        11 Jul 2024 06:50  Mg     2.2       11 Jul 2024 06:50    TPro  7.4    /  Alb  4.3    /  TBili  0.7    /  DBili  x      /  AST  17     /  ALT  11     /  AlkPhos  83     11 Jul 2024 06:50                    Urinalysis Basic - ( 11 Jul 2024 06:50 )    Color: x / Appearance: x / SG: x / pH: x  Gluc: 97 mg/dL / Ketone: x  / Bili: x / Urobili: x   Blood: x / Protein: x / Nitrite: x   Leuk Esterase: x / RBC: x / WBC x   Sq Epi: x / Non Sq Epi: x / Bacteria: x

## 2024-07-11 NOTE — PROGRESS NOTE ADULT - SUBJECTIVE AND OBJECTIVE BOX
AMARA UMANZOR  79y Female    CHIEF COMPLAINT:    Patient is a 79y old  Female who presents with a chief complaint of SOB (2024 10:36)      INTERVAL HPI/OVERNIGHT EVENTS:    Patient seen and examined. No sob. C/O feeling dizzy. No cp    ROS: All other systems are negative.    Vital Signs:    T(F): 97.8 (24 @ 05:00), Max: 97.8 (24 @ 05:00)  HR: 94 (24 @ 05:00) (85 - 102)  BP: 136/80 (24 @ 05:00) (136/80 - 143/88)  RR: 20 (24 @ 09:10) (18 - 20)  SpO2: 93% (24 @ 09:10) (93% - 96%)  I&O's Summary    10 Jul 2024 07:  -  2024 07:00  --------------------------------------------------------  IN: 892 mL / OUT: 1650 mL / NET: -758 mL    2024 07:01  -  2024 12:42  --------------------------------------------------------  IN: 0 mL / OUT: 1000 mL / NET: -1000 mL      Daily     Daily Weight in k.8 (2024 05:00)  CAPILLARY BLOOD GLUCOSE          PHYSICAL EXAM:    GENERAL:  NAD  SKIN: No rashes or lesions  HENT: Atraumatic. Normocephalic. PERRL. Moist membranes.  NECK: Supple, No JVD. No lymphadenopathy.  PULMONARY: CTA B/L. No wheezing. No rales  CVS: Normal S1, S2. Rate and Rhythm are regular. No murmurs.  ABDOMEN/GI: Soft, Nontender, Nondistended; BS present  EXTREMITIES: Peripheral pulses intact. No edema B/L LE.  NEUROLOGIC:  No motor or sensory deficit.  PSYCH: Alert & oriented x 3    Consultant(s) Notes Reviewed:  [x ] YES  [ ] NO  Care Discussed with Consultants/Other Providers [ x] YES  [ ] NO    EKG reviewed  Telemetry reviewed    LABS:                        15.1   9.31  )-----------( 308      ( 2024 06:50 )             51.2         145  |  98  |  25<H>  ----------------------------<  97    Creatinine Trend: 0.8<--, 0.6<--, 0.7<--, 0.7<--, 0.8<--, 0.8<--  3.8   |  32  |  0.8    Ca    9.9      2024 06:50  Mg     2.2         TPro  7.4  /  Alb  4.3  /  TBili  0.7  /  DBili  x   /  AST  17  /  ALT  11  /  AlkPhos  83                RADIOLOGY & ADDITIONAL TESTS:      Imaging or report Personally Reviewed:  [ ] YES  [ ] NO    Medications:  Standing  apixaban 5 milliGRAM(s) Oral every 12 hours  atorvastatin 20 milliGRAM(s) Oral at bedtime  furosemide   Injectable 40 milliGRAM(s) IV Push two times a day  iron sucrose IVPB 200 milliGRAM(s) IV Intermittent every 24 hours  levothyroxine 125 MICROGram(s) Oral daily  losartan 100 milliGRAM(s) Oral daily  meclizine 12.5 milliGRAM(s) Oral three times a day  metolazone 5 milliGRAM(s) Oral daily  metoprolol tartrate 50 milliGRAM(s) Oral three times a day  pantoprazole    Tablet 40 milliGRAM(s) Oral before breakfast  polyethylene glycol 3350 17 Gram(s) Oral daily  senna 2 Tablet(s) Oral at bedtime    PRN Meds  acetaminophen     Tablet .. 650 milliGRAM(s) Oral every 6 hours PRN  aluminum hydroxide/magnesium hydroxide/simethicone Suspension 30 milliLiter(s) Oral every 4 hours PRN  melatonin 3 milliGRAM(s) Oral at bedtime PRN  ondansetron Injectable 4 milliGRAM(s) IV Push every 8 hours PRN  oxycodone    5 mG/acetaminophen 325 mG 1 Tablet(s) Oral every 6 hours PRN      Case discussed with resident    Care discussed with pt/family

## 2024-07-11 NOTE — PROGRESS NOTE ADULT - ASSESSMENT
79-year-old female history of obesity, hypothyroidism, vertigo, insomnia, GERD, hypertension, osteoarthritis, HFpEF, A-fib on Eliquis, aortic stenosis, pulmonary hypertension, recent admission for shortness of breath and lower extremity edema. Patient was noted to be in A-fib RVR in the ambulance which resolved after 25 mg push of Cardizem. Patient is being managed for acute systolic HF likely 2/2 under-diuresis.     PLAN:    #HFmrEF #A-fib with RVR   - Tele reviewed- in A.fib  - EKG on admission: A-fib 71/min. Q waves in V1-V2. RAD   - Troponin: 20-->25-->34  - ECHO reviewed. EF is 45-50%  - Pro-BNP 1975-->459  - per cardiology:   - Lasix 40 mg IV BID for goal net negative 1-2L daily. If not achieving goal, can increase to Lasix 60-80 mg IV BID.   - Once patient can be weaned off oxygen at rest and with ambulation, would start Lasix 40 mg PO BID.  - She and her son was instructed they can increase her diuretic dose to Lasix 80 mg PO BID if she develops worsening CHF symptoms.   - Check i's and o's and daily wt  - Low salt diet and water restriction to 1.5 L/D  - Monitor electrolytes. Replete K if below 4.   - SC/W Venofer 200 mg iv daily x 5 doses  - On GDMT. C/W losartan 100 mg po daily and Metoprolol Succinate 50 mg q12hr  - Old record reviewed. Had CCTA done 6/4/24. Extensive motion artifact.   - F/U CXR   - Start with metolazone to improve diuresis   - Observe oxygen saturation on room air     #chronic Afib on Eliquis  - CHADsVaSc 3   - Eliquis 5mg bid  - metoprolol 50mg bid    #HTN  - cw losartan 100, metoprolol [id number]    #Hypothyroid  - TSH elevated 12.75   - Levothyroxine 125 daily    79-year-old female history of obesity, hypothyroidism, vertigo, insomnia, GERD, hypertension, osteoarthritis, HFpEF, A-fib on Eliquis, aortic stenosis, pulmonary hypertension, recent admission for shortness of breath and lower extremity edema. Patient was noted to be in A-fib RVR in the ambulance which resolved after 25 mg push of Cardizem. Patient is being managed for acute systolic HF likely 2/2 under-diuresis.     PLAN:    #HFmrEF #A-fib with RVR   - Tele reviewed- in A.fib  - EKG on admission: A-fib 71/min. Q waves in V1-V2. RAD   - Troponin: 20-->25-->34  - ECHO reviewed. EF is 45-50%  - Pro-BNP 1975-->459  - per cardiology:   - Lasix 40 mg IV BID for goal net negative 1-2L daily. If not achieving goal, can increase to Lasix 60-80 mg IV BID.   - Once patient can be weaned off oxygen at rest and with ambulation, would start Lasix 40 mg PO BID.  - She and her son was instructed they can increase her diuretic dose to Lasix 80 mg PO BID if she develops worsening CHF symptoms.   - Check i's and o's and daily wt  - Low salt diet and water restriction to 1.5 L/D  - Monitor electrolytes. Replete K if below 4.   - SC/W Venofer 200 mg iv daily x 5 doses  - On GDMT. C/W losartan 100 mg po daily and Metoprolol Succinate 50 mg q12hr  - Old record reviewed. Had CCTA done 6/4/24. Extensive motion artifact.   - Start with metolazone to improve diuresis   - on 94% when ambulating on RA    #chronic Afib on Eliquis  - CHADsVaSc 3   - Eliquis 5mg bid  - metoprolol 50mg bid    #HTN  - cw losartan 100, metoprolol [id number]    #Hypothyroid  - TSH elevated 12.75   - Levothyroxine 125 daily

## 2024-07-11 NOTE — PROGRESS NOTE ADULT - ASSESSMENT
79-year-old female history of obesity, hypothyroidism, vertigo, insomnia, GERD, hypertension, osteoarthritis, HFpEF mod TR A-fib on Eliquis, aortic stenosis, pulmonary hypertension, recent admission for shortness of breath and lower extremity edema presents to the ED accompanied by son complaining of shortness of breath worse of the past 3 to 4 days.  At night felt dizzy and lightheaded. When they called the ambulance in AM, was noted to be in A-fib RVR which resolved after 25 mg push of Cardizem.    Acute HFmrEF  CP  Long term persistent A-fib  Obesity               PLAN:    ·	Tele reviewed by me. In A-fib  ·	EKG on admission: A-fib 71/min. Q waves in V1-V2. RAD (Interpreted by me)  ·	Troponin: 20-->25-->34  ·	ECHO reviewed. EF is 45-50%  ·	Pro-BNP 1975-->4592  ·	Pt is euvolemic now. Hold diuretics today. On d/c Torsemide 20 mg po daily  ·	Check i's and o's and daily wt  ·	Low salt diet and water restriction to 1.5 L/D  ·	Monitor electrolytes.   ·	Serum iron is 33 and percent sat is 10. Start her on Venofer 200 mg iv daily x 5 doses  ·	On GDMT. Cont Losartan and Metoprolol Succinate. Cont Metoprolol 50 mg po q 8h. On discharge switch her to Metoprolol Succinate 150 mg po daily  ·	Cardiology eval noted. Cont the current management  ·	Old record reviewed. Had CCTA done 6/4/24. Extensive motion artifact.   ·	TSH is 34. Increase Synthroid to 125 mcg po daily  ·	C/O dizziness. Check orthostatic vitals and POX on RA on exertion    Progress Note Handoff    Pending (specify):  Consults________, Tests________, Test Results_______, Other__Anticipate d/c in AM_____  Family discussion: Diagnosis and plan of care d/w the son   Disposition: Home___/SNF___/Other________/Unknown at this time________    Kael Lobo MD  Spectra: [phone]

## 2024-07-12 ENCOUNTER — TRANSCRIPTION ENCOUNTER (OUTPATIENT)
Age: 79
End: 2024-07-12

## 2024-07-12 LAB
ANION GAP SERPL CALC-SCNC: 17 MMOL/L — HIGH (ref 7–14)
BUN SERPL-MCNC: 40 MG/DL — HIGH (ref 10–20)
CALCIUM SERPL-MCNC: 10.1 MG/DL — SIGNIFICANT CHANGE UP (ref 8.4–10.5)
CHLORIDE SERPL-SCNC: 98 MMOL/L — SIGNIFICANT CHANGE UP (ref 98–110)
CO2 SERPL-SCNC: 26 MMOL/L — SIGNIFICANT CHANGE UP (ref 17–32)
CREAT SERPL-MCNC: 1.1 MG/DL — SIGNIFICANT CHANGE UP (ref 0.7–1.5)
EGFR: 50 ML/MIN/1.73M2 — LOW
GLUCOSE SERPL-MCNC: 107 MG/DL — HIGH (ref 70–99)
HCT VFR BLD CALC: 51.7 % — HIGH (ref 37–47)
HGB BLD-MCNC: 15.9 G/DL — SIGNIFICANT CHANGE UP (ref 12–16)
MAGNESIUM SERPL-MCNC: 2.1 MG/DL — SIGNIFICANT CHANGE UP (ref 1.8–2.4)
MCHC RBC-ENTMCNC: 25.7 PG — LOW (ref 27–31)
MCHC RBC-ENTMCNC: 30.8 G/DL — LOW (ref 32–37)
MCV RBC AUTO: 83.5 FL — SIGNIFICANT CHANGE UP (ref 81–99)
NRBC # BLD: 0 /100 WBCS — SIGNIFICANT CHANGE UP (ref 0–0)
PLATELET # BLD AUTO: 336 K/UL — SIGNIFICANT CHANGE UP (ref 130–400)
PMV BLD: 10 FL — SIGNIFICANT CHANGE UP (ref 7.4–10.4)
POTASSIUM SERPL-MCNC: 3.8 MMOL/L — SIGNIFICANT CHANGE UP (ref 3.5–5)
POTASSIUM SERPL-SCNC: 3.8 MMOL/L — SIGNIFICANT CHANGE UP (ref 3.5–5)
RBC # BLD: 6.19 M/UL — HIGH (ref 4.2–5.4)
RBC # FLD: 23.9 % — HIGH (ref 11.5–14.5)
SODIUM SERPL-SCNC: 141 MMOL/L — SIGNIFICANT CHANGE UP (ref 135–146)
WBC # BLD: 12.02 K/UL — HIGH (ref 4.8–10.8)
WBC # FLD AUTO: 12.02 K/UL — HIGH (ref 4.8–10.8)

## 2024-07-12 PROCEDURE — 99232 SBSQ HOSP IP/OBS MODERATE 35: CPT

## 2024-07-12 RX ORDER — MECLIZINE HCL 25 MG
25 TABLET ORAL ONCE
Refills: 0 | Status: COMPLETED | OUTPATIENT
Start: 2024-07-12 | End: 2024-07-12

## 2024-07-12 RX ORDER — DEXTROSE MONOHYDRATE AND SODIUM CHLORIDE 5; .3 G/100ML; G/100ML
500 INJECTION, SOLUTION INTRAVENOUS
Refills: 0 | Status: DISCONTINUED | OUTPATIENT
Start: 2024-07-12 | End: 2024-07-13

## 2024-07-12 RX ORDER — MECLIZINE HCL 25 MG
12.5 TABLET ORAL ONCE
Refills: 0 | Status: COMPLETED | OUTPATIENT
Start: 2024-07-12 | End: 2024-07-12

## 2024-07-12 RX ADMIN — APIXABAN 5 MILLIGRAM(S): 5 TABLET, FILM COATED ORAL at 17:12

## 2024-07-12 RX ADMIN — Medication 2 TABLET(S): at 22:51

## 2024-07-12 RX ADMIN — IRON SUCROSE 110 MILLIGRAM(S): 20 INJECTION, SOLUTION INTRAVENOUS at 13:25

## 2024-07-12 RX ADMIN — Medication 50 MILLIGRAM(S): at 13:21

## 2024-07-12 RX ADMIN — POLYETHYLENE GLYCOL 3350 17 GRAM(S): 1 POWDER ORAL at 13:22

## 2024-07-12 RX ADMIN — APIXABAN 5 MILLIGRAM(S): 5 TABLET, FILM COATED ORAL at 06:14

## 2024-07-12 RX ADMIN — PANTOPRAZOLE SODIUM 40 MILLIGRAM(S): 40 INJECTION, POWDER, FOR SOLUTION INTRAVENOUS at 06:27

## 2024-07-12 RX ADMIN — Medication 25 MILLIGRAM(S): at 13:22

## 2024-07-12 RX ADMIN — DEXTROSE MONOHYDRATE AND SODIUM CHLORIDE 100 MILLILITER(S): 5; .3 INJECTION, SOLUTION INTRAVENOUS at 08:58

## 2024-07-12 RX ADMIN — Medication 50 MILLIGRAM(S): at 06:14

## 2024-07-12 RX ADMIN — Medication 125 MICROGRAM(S): at 06:15

## 2024-07-12 RX ADMIN — LOSARTAN POTASSIUM 100 MILLIGRAM(S): 100 TABLET, FILM COATED ORAL at 06:14

## 2024-07-12 RX ADMIN — Medication 3 MILLIGRAM(S): at 22:52

## 2024-07-12 RX ADMIN — Medication 50 MILLIGRAM(S): at 22:51

## 2024-07-12 RX ADMIN — ATORVASTATIN CALCIUM 20 MILLIGRAM(S): 20 TABLET, FILM COATED ORAL at 22:51

## 2024-07-12 NOTE — DISCHARGE NOTE PROVIDER - NSDCMRMEDTOKEN_GEN_ALL_CORE_FT
Eliquis 5 mg oral tablet: 1 tab(s) orally 2 times a day  furosemide 40 mg oral tablet: 1 tab(s) orally once a day  losartan 50 mg oral tablet: 1 tab(s) orally once a day  meclizine 12.5 mg oral tablet: 1 tab(s) orally 3 times a day as needed for  dizziness  omeprazole 40 mg oral delayed release capsule: 1 cap(s) orally once a day  Percocet 5 mg-325 mg oral tablet: 1 tab(s) orally every 6 hours as needed for  severe pain  pravastatin 40 mg oral tablet: 1 tab(s) orally once a day  Vitamin D3 50 mcg (2000 intl units) oral capsule: 1 cap(s) orally once a day   Eliquis 5 mg oral tablet: 1 tab(s) orally 2 times a day  levothyroxine 125 mcg (0.125 mg) oral tablet: 1 tab(s) orally once a day  losartan 50 mg oral tablet: 1 tab(s) orally once a day  meclizine 12.5 mg oral tablet: 1 tab(s) orally 3 times a day as needed for  dizziness  metoprolol succinate 50 mg oral tablet, extended release: 3 tab(s) orally once a day Toprol  once a day  omeprazole 40 mg oral delayed release capsule: 1 cap(s) orally once a day  Percocet 5 mg-325 mg oral tablet: 1 tab(s) orally every 6 hours as needed for  severe pain  pravastatin 40 mg oral tablet: 1 tab(s) orally once a day  torsemide 20 mg oral tablet: 1 tab(s) orally once a day  Vitamin D3 50 mcg (2000 intl units) oral capsule: 1 cap(s) orally once a day

## 2024-07-12 NOTE — DISCHARGE NOTE PROVIDER - NSDCQMAMI_CARD_ALL_CORE
LICENSED HEALTH CARE PROVIDER DIABETES SCHOOL ORDERS    Diabetes Treatment Orders for Children at School   Orders Valid for Current School Year: 2982-6006  Orders are invalid if altered by anyone other than student's diabetes provider.     Date: 2020  School Name: [employer] Fax Number: [phone]    STUDENT NAME: Giovanni Diaz    : 2017      PART I: GENERAL INFORMATION      Diabetes Mellitus: Type 1     This student is NOT independent in self-managing all aspects of his/her diabetes care. I authorize the school nurse, in collaboration with the parent/guardian, to determine the level of supervision and/or assistance by the student for each of the following diabetes orders.    All students, regardless of age or experience, require a plan and may need assistance with hypoglycemia, glucagon and illness.        PARENT(S)/GUARDIAN AND STUDENT ARE RESPONSIBLE FOR PROVIDING AND MAINTAINING:  - Snacks and low blood sugar treatments  - Blood sugar meter, lancing device, lancets and test strips  - Glucagon Emergency Kit. (If family chooses to provide)  - Ketone strips  - Insulin and syringes/pen.  (If on multiple daily injections)  - CGM  or phone if applicable      1            STUDENT NAME: Giovanni Diaz       : 2017    PART II : INSULIN ORDERS    Diabetes Treatment Orders for Children at School   Orders Valid for Current School Year: 2349-8077  Orders are invalid if altered by anyone other than student's diabetes provider.     School Name: [employer] Fax Number: [phone]     THIS IS AN UPDATED INSULIN ORDER AS OF 2020. PLEASE CANCEL PREVIOUS INSULIN ORDERS.  These insulin orders cover student during all school hours AND school-sponsored activities.     All students, regardless of age or experience, require a plan and may need assistance with hypoglycemia, glucagon and illness.   If there is an overnight field trip, please contact  "our office 1 week in advance.     INSULIN ORDERS:  ROUTINE (Meal time) Insulin: Yes  Fast-acting insulin type: Humalog or Humalog Jr.          2                                STUDENT NAME: Giovanni Diaz YOB: 2017    PART II A: Multiple Daily Injections      Insulin to Carbohydrate Ratio (ICR)     ROUTINE Insulin-to-Carbohydrate Coverage:  Breakfast: 1 unit per 12 grams carbs  Or 0.5 unit per 6 grams on Humalog Jr.  Lunch: 1 unit per 12 grams carbs  Or 0.5 unit per 6 grams on Humalog Jr.  Dinner: 1 unit per 12 grams carbs  Or 0.5 unit per 6 grams on Humalog Jr.    NON-ROUTINE Insulin-to-Carbohydrate Coverage:  AM Snack: 1 unit per 12 grams carbs Or 0.5 unit per 6 grams on Humalog Jr.  PM Snack: 1 unit per 12 grams carbs Or 0.5 unit per 6 grams on Humalog Jr.    High Sugar Correction (HSC) at meal time only:  1 unit for every 50 over 150:  200-249 = 1 unit  250-299 = 2 units  300-349 = 3 units  350-399 = 4 units  400-449 = 5 units  450-499 = 6 units  500 or greater = 7 units    If school personnel unable to reach  and have urgent questions, please call student's diabetes provider.    Individual Orders: None    Provider Signature:    Provider Name: Yomaira Orlando MD                       Date: 2020      3            STUDENT NAME: Giovanni Diaz       : 2017    PART IIl: NUTRITION AND MONITORING    Snacks: Per parents' instructions    Routine Blood Glucose Testing:  Check blood sugars by: Glucometer     If student does not have a Continuous Glucose Monitor (CGM), finger stick blood sugar should be obtained:  \" Before meals (breakfast, lunch)  \" Other: none  \" For signs/symptoms of high/low blood sugar  \" Other, as outlined in 504/IEP/health plan    Continuous Glucose Monitor Use: No        4                                              STUDENT NAME: Giovanni Diaz       : 2017    PART IV: TREATMENT OF LOW & HIGH BLOOD GLUCOSE    TREATMENT OF LOW BLOOD GLUCOSE     If blood glucose " No is < 75 OR student has symptoms of hypoglycemia:    - Give 15 grams fast-acting carbohydrates such as 4 glucose tablets OR 4 oz juice, etc    - Recheck finger stick blood sugar in 15 minutes. If still less than 75 mg/dL repeat treatment as above.    - If still less than 75 mg/dL after THREE treatments, continue treatment, call . If unable to reach , call diabetes provider. If child looks unstable, call 911.    - When finger stick blood sugar is greater than 75 mg/dL, if more than one hour until the next meal/snack, give a snack of less than15 grams of complex carbohydrate plus a protein.    TREATMENT OF SEVERE HYPOGLYCEMIA: If unconscious, having a seizure, unable to swallow, unable to speak, or disoriented:    - Assume low blood sugar is the problem  - Do not put anything in the student's mouth  - Give Glucagon: 0.5 mg IM   - Place student on their side  - Check finger stick blood sugar if possible  - Call 911  - Call the       5                  STUDENT NAME: Giovanni Diaz       : 2017    PART IV: TREATMENT OF LOW & HIGH BLOOD GLUCOSE CONTINUED:       TREATMENT OF HIGH BLOOD GLUCOSE WITH KETONES    - If finger stick blood sugar is greater than 300 mg/dL AND/OR student is experiencing any nausea/vomiting: TEST KETONES    - Provide free access to carbohydrate-free fluids (water) and toilet facilities (do not push/force fluids).    - If ketones are Negative, Trace or Small (0-0.5 mmol/L for blood ketone meter) and NO sick symptoms:  All activities are allowed, including exercise. May return to class.    - If ketones are Moderate or Large (over 0.5 mmol/L for blood ketone meter) AND/OR student is nauseous, vomiting or complains of abdominal pain: DO NOT ALLOW EXERCISE. Call  to  the child from school. If unable to reach the , call 911.    - If blood sugar greater than 300 without ketones, student's blood sugar is to be rechecked in 2  hours or prior to school ending.        6                                  STUDENT NAME: Giovanni Diaz       : 2017    SIGNATURES:    Health Care Provider Signature:   Health Care Provider Name: Yomaira Orlando MD  Date: 2020  Phone: [phone]  Fax: [phone]        Parent/Guardian Signature:  Parent/Guardian Name:  Date:  Phone:        School Nurse Signature:  School Nurse Name/Title:  Date: 2020      7

## 2024-07-12 NOTE — DISCHARGE NOTE PROVIDER - CARE PROVIDER_API CALL
CAMILO BOYCE  [address] [place] [address]  Phone: [phone]  Fax: [phone]  Follow Up Time: 1 week

## 2024-07-12 NOTE — DISCHARGE NOTE PROVIDER - HOSPITAL COURSE
79-year-old female patient with a PMH of obesity, cataracts, hypothyroidism, vertigo, insomnia, GERD, hypertension, osteoarthritis, HFpEF, A-fib on Eliquis, aortic stenosis, pulmonary hypertension presents to the ED accompanied by son complaining of shortness of breath that has been worsening for the past 3-4 days. In the ambulance, the patient was noted to be in A-fib RVR which resolved after 25 mg push of Cardizem. Pro-BNP was measured to be 4592 pg/mL, and negative troponin. Patient received IV 40 mg lasix. EKG on admission showed A-fib 71/min. Q waves in V1-V2. ECHO was performed and showed an EF of 45-50%    Cardiology was consulted and they recommended to increased the patient's dose of Lasix to 40mg IV 12h. Patient's serum iron was33 and percent saturation was 10, so we started her on Venofer 200 mg iv daily x 5 doses. Patient's BP was running high so we increased Losartan to 100 mg po daily.    Patient's TSH was 34 so we increased Synthroid to 125 mcg po daily.    Orthostatic vitals were take and they were negative. Patient is medically cleared for discharge.

## 2024-07-12 NOTE — DISCHARGE NOTE PROVIDER - NSDCCPCAREPLAN_GEN_ALL_CORE_FT
PRINCIPAL DISCHARGE DIAGNOSIS  Diagnosis: Acute HF (heart failure)  Assessment and Plan of Treatment: You came into the hospital with complaints of worsening shortness of breath. Based on our clinical evaluation, it was determined that your heart was not pumping effectively so you were retaining excess fluid in your body.  We admitted you for the managenet of acute heart failure.   To treat this heart failure, we gave you diuretics in order to remove the excess fluid from your body.   Your condition has been managed, and you are cleared to be discharged from the hospital.

## 2024-07-12 NOTE — PROGRESS NOTE ADULT - ASSESSMENT
79-year-old female history of obesity, hypothyroidism, vertigo, insomnia, GERD, hypertension, osteoarthritis, HFpEF mod TR A-fib on Eliquis, aortic stenosis, pulmonary hypertension, recent admission for shortness of breath and lower extremity edema presents to the ED accompanied by son complaining of shortness of breath worse of the past 3 to 4 days.  At night felt dizzy and lightheaded. When they called the ambulance in AM, was noted to be in A-fib RVR which resolved after 25 mg push of Cardizem.    Acute HFmrEF  CP  Long term persistent A-fib  Obesity               PLAN:    ·	Tele reviewed by me. In A-fib  ·	EKG on admission: A-fib 71/min. Q waves in V1-V2. RAD (Interpreted by me)  ·	Troponin: 20-->25-->34  ·	ECHO reviewed. EF is 45-50%  ·	Pro-BNP 1975-->4592  ·	Hold diuretics today. On d/c Torsemide 20 mg po daily  ·	Overdiuresis. Will give her  cc/hr x 500cc.   ·	Meclizine PRN. Give first dose now.   ·	Check i's and o's and daily wt  ·	Low salt diet and water restriction to 1.5 L/D  ·	Monitor electrolytes.   ·	Serum iron is 33 and percent sat is 10. Start her on Venofer 200 mg iv daily x 5 doses  ·	On GDMT. Cont Losartan and Metoprolol Succinate. Cont Metoprolol 50 mg po q 8h. On discharge switch her to Metoprolol Succinate 150 mg po daily  ·	Cardiology eval noted. Cont the current management  ·	Old record reviewed. Had CCTA done 6/4/24. Extensive motion artifact.   ·	TSH is 34. Increase Synthroid to 125 mcg po daily  ·	Orthostatic vitals are negative. RA POX is 94%    Progress Note Handoff    Pending (specify):  Consults________, Tests________, Test Results_______, Other__Anticipate d/c in AM_____  Family discussion: Diagnosis and plan of care d/w the son   Disposition: Home___/SNF___/Other________/Unknown at this time________    Kael Lobo MD  Spectra: [phone]

## 2024-07-12 NOTE — PROGRESS NOTE ADULT - SUBJECTIVE AND OBJECTIVE BOX
AMARA UMANZOR  79y Female    CHIEF COMPLAINT:    Patient is a 79y old  Female who presents with a chief complaint of SOB (2024 14:01)      INTERVAL HPI/OVERNIGHT EVENTS:    Patient seen and examined. Complains that she is feeling dizzy. No sob.     ROS: All other systems are negative.    Vital Signs:    T(F): 97.6 (24 @ 05:00), Max: 97.9 (24 @ 21:21)  HR: 100 (24 @ 05:00) (90 - 100)  BP: 116/75 (24 @ 05:00) (114/81 - 143/94)  RR: 18 (24 @ 05:00) (18 - 19)  SpO2: 94% (24 @ 12:56) (94% - 94%)  I&O's Summary    2024 07:01  -  2024 07:00  --------------------------------------------------------  IN: 966 mL / OUT: 2901 mL / NET: -1935 mL      Daily     Daily Weight in k.2 (2024 05:00)  CAPILLARY BLOOD GLUCOSE          PHYSICAL EXAM:    GENERAL:  NAD  SKIN: No rashes or lesions  HENT: Atraumatic. Normocephalic. PERRL. Moist membranes.  NECK: Supple, No JVD. No lymphadenopathy.  PULMONARY: CTA B/L. No wheezing. No rales  CVS: Normal S1, S2. Rate and Rhythm are regular. No murmurs.  ABDOMEN/GI: Soft, Nontender, Nondistended; BS present  EXTREMITIES: Peripheral pulses intact. No edema B/L LE.  NEUROLOGIC:  No motor or sensory deficit.  PSYCH: Alert & oriented x 3    Consultant(s) Notes Reviewed:  [x ] YES  [ ] NO  Care Discussed with Consultants/Other Providers [ x] YES  [ ] NO    EKG reviewed  Telemetry reviewed    LABS:                        15.9   12.02 )-----------( 336      ( 2024 06:06 )             51.7     07-12    141  |  98  |  40<H>  ----------------------------<  107<H>  3.8   |  26  |  1.1    Ca    10.1      2024 06:06  Mg     2.1         TPro  7.4  /  Alb  4.3  /  TBili  0.7  /  DBili  x   /  AST  17  /  ALT  11  /  AlkPhos  83                RADIOLOGY & ADDITIONAL TESTS:      Imaging or report Personally Reviewed:  [ ] YES  [ ] NO    Medications:  Standing  apixaban 5 milliGRAM(s) Oral every 12 hours  atorvastatin 20 milliGRAM(s) Oral at bedtime  iron sucrose IVPB 200 milliGRAM(s) IV Intermittent every 24 hours  lactated ringers. 500 milliLiter(s) IV Continuous <Continuous>  levothyroxine 125 MICROGram(s) Oral daily  losartan 100 milliGRAM(s) Oral daily  metoprolol tartrate 50 milliGRAM(s) Oral three times a day  pantoprazole    Tablet 40 milliGRAM(s) Oral before breakfast  polyethylene glycol 3350 17 Gram(s) Oral daily  senna 2 Tablet(s) Oral at bedtime    PRN Meds  acetaminophen     Tablet .. 650 milliGRAM(s) Oral every 6 hours PRN  aluminum hydroxide/magnesium hydroxide/simethicone Suspension 30 milliLiter(s) Oral every 4 hours PRN  melatonin 3 milliGRAM(s) Oral at bedtime PRN  ondansetron Injectable 4 milliGRAM(s) IV Push every 8 hours PRN  oxycodone    5 mG/acetaminophen 325 mG 1 Tablet(s) Oral every 6 hours PRN      Case discussed with resident    Care discussed with pt/family

## 2024-07-12 NOTE — DISCHARGE NOTE PROVIDER - CARE PROVIDERS DIRECT ADDRESSES
[email or web address]

## 2024-07-13 VITALS
HEART RATE: 90 BPM | DIASTOLIC BLOOD PRESSURE: 83 MMHG | RESPIRATION RATE: 18 BRPM | TEMPERATURE: 97 F | SYSTOLIC BLOOD PRESSURE: 117 MMHG | OXYGEN SATURATION: 94 %

## 2024-07-13 LAB
ANION GAP SERPL CALC-SCNC: 15 MMOL/L — HIGH (ref 7–14)
BUN SERPL-MCNC: 37 MG/DL — HIGH (ref 10–20)
CALCIUM SERPL-MCNC: 9.6 MG/DL — SIGNIFICANT CHANGE UP (ref 8.4–10.5)
CHLORIDE SERPL-SCNC: 101 MMOL/L — SIGNIFICANT CHANGE UP (ref 98–110)
CO2 SERPL-SCNC: 29 MMOL/L — SIGNIFICANT CHANGE UP (ref 17–32)
CREAT SERPL-MCNC: 0.9 MG/DL — SIGNIFICANT CHANGE UP (ref 0.7–1.5)
EGFR: 64 ML/MIN/1.73M2 — SIGNIFICANT CHANGE UP
GLUCOSE SERPL-MCNC: 107 MG/DL — HIGH (ref 70–99)
MAGNESIUM SERPL-MCNC: 2.1 MG/DL — SIGNIFICANT CHANGE UP (ref 1.8–2.4)
POTASSIUM SERPL-MCNC: 3.8 MMOL/L — SIGNIFICANT CHANGE UP (ref 3.5–5)
POTASSIUM SERPL-SCNC: 3.8 MMOL/L — SIGNIFICANT CHANGE UP (ref 3.5–5)
SODIUM SERPL-SCNC: 145 MMOL/L — SIGNIFICANT CHANGE UP (ref 135–146)

## 2024-07-13 PROCEDURE — 99239 HOSP IP/OBS DSCHRG MGMT >30: CPT

## 2024-07-13 RX ORDER — TORSEMIDE 20 MG/1
1 TABLET ORAL
Qty: 30 | Refills: 0
Start: 2024-07-13 | End: 2024-08-11

## 2024-07-13 RX ORDER — METOPROLOL TARTRATE 50 MG
3 TABLET ORAL
Qty: 90 | Refills: 0
Start: 2024-07-13 | End: 2024-08-11

## 2024-07-13 RX ORDER — LEVOTHYROXINE SODIUM 25 MCG
1 TABLET ORAL
Qty: 30 | Refills: 0
Start: 2024-07-13 | End: 2024-08-11

## 2024-07-13 RX ADMIN — Medication 50 MILLIGRAM(S): at 06:31

## 2024-07-13 RX ADMIN — APIXABAN 5 MILLIGRAM(S): 5 TABLET, FILM COATED ORAL at 06:32

## 2024-07-13 RX ADMIN — LOSARTAN POTASSIUM 100 MILLIGRAM(S): 100 TABLET, FILM COATED ORAL at 06:31

## 2024-07-13 RX ADMIN — Medication 125 MICROGRAM(S): at 06:32

## 2024-07-13 RX ADMIN — PANTOPRAZOLE SODIUM 40 MILLIGRAM(S): 40 INJECTION, POWDER, FOR SOLUTION INTRAVENOUS at 06:32

## 2024-07-13 NOTE — PROGRESS NOTE ADULT - SUBJECTIVE AND OBJECTIVE BOX
AMARA UMANZOR  79y Female    CHIEF COMPLAINT:    Patient is a 79y old  Female who presents with a chief complaint of HFmrEF Exacerbation  (12 Jul 2024 13:43)      INTERVAL HPI/OVERNIGHT EVENTS:    Patient seen and examined. Feels better today. No new complaint    ROS: All other systems are negative.    Vital Signs:    T(F): 96.8 (07-13-24 @ 04:38), Max: 97 (07-12-24 @ 13:26)  HR: 90 (07-13-24 @ 04:38) (90 - 101)  BP: 117/83 (07-13-24 @ 04:38) (115/72 - 140/82)  RR: 18 (07-13-24 @ 04:38) (18 - 18)  SpO2: 94% (07-13-24 @ 04:38) (94% - 94%)  I&O's Summary    12 Jul 2024 07:01  -  13 Jul 2024 07:00  --------------------------------------------------------  IN: 1012 mL / OUT: 700 mL / NET: 312 mL      Daily     Daily   CAPILLARY BLOOD GLUCOSE          PHYSICAL EXAM:    GENERAL:  NAD  SKIN: No rashes or lesions  HENT: Atraumatic. Normocephalic. PERRL. Moist membranes.  NECK: Supple, No JVD. No lymphadenopathy.  PULMONARY: CTA B/L. No wheezing. No rales  CVS: Normal S1, S2. Rate and Rhythm are regular. No murmurs.  ABDOMEN/GI: Soft, Nontender, Nondistended; BS present  EXTREMITIES: Peripheral pulses intact. No edema B/L LE.  NEUROLOGIC:  No motor or sensory deficit.  PSYCH: Alert & oriented x 3    Consultant(s) Notes Reviewed:  [x ] YES  [ ] NO  Care Discussed with Consultants/Other Providers [ x] YES  [ ] NO    EKG reviewed  Telemetry reviewed    LABS:                        15.9   12.02 )-----------( 336      ( 12 Jul 2024 06:06 )             51.7     07-13    145  |  101  |  37<H>  ----------------------------<  107<H>  3.8   |  29  |  0.9    Ca    9.6      13 Jul 2024 05:12  Mg     2.1     07-13                RADIOLOGY & ADDITIONAL TESTS:      Imaging or report Personally Reviewed:  [ ] YES  [ ] NO    Medications:  Standing  apixaban 5 milliGRAM(s) Oral every 12 hours  atorvastatin 20 milliGRAM(s) Oral at bedtime  lactated ringers. 500 milliLiter(s) IV Continuous <Continuous>  levothyroxine 125 MICROGram(s) Oral daily  losartan 100 milliGRAM(s) Oral daily  metoprolol tartrate 50 milliGRAM(s) Oral three times a day  pantoprazole    Tablet 40 milliGRAM(s) Oral before breakfast  polyethylene glycol 3350 17 Gram(s) Oral daily  senna 2 Tablet(s) Oral at bedtime    PRN Meds  acetaminophen     Tablet .. 650 milliGRAM(s) Oral every 6 hours PRN  aluminum hydroxide/magnesium hydroxide/simethicone Suspension 30 milliLiter(s) Oral every 4 hours PRN  melatonin 3 milliGRAM(s) Oral at bedtime PRN  ondansetron Injectable 4 milliGRAM(s) IV Push every 8 hours PRN  oxycodone    5 mG/acetaminophen 325 mG 1 Tablet(s) Oral every 6 hours PRN      Case discussed with resident    Care discussed with pt/family

## 2024-07-13 NOTE — PROGRESS NOTE ADULT - PROVIDER SPECIALTY LIST ADULT
Hospitalist
Hospitalist
Internal Medicine
Hospitalist
Hospitalist
Internal Medicine
Hospitalist
Hospitalist
Internal Medicine

## 2024-07-13 NOTE — PROGRESS NOTE ADULT - ASSESSMENT
79-year-old female history of obesity, hypothyroidism, vertigo, insomnia, GERD, hypertension, osteoarthritis, HFpEF mod TR A-fib on Eliquis, aortic stenosis, pulmonary hypertension, recent admission for shortness of breath and lower extremity edema presents to the ED accompanied by son complaining of shortness of breath worse of the past 3 to 4 days.  At night felt dizzy and lightheaded. When they called the ambulance in AM, was noted to be in A-fib RVR which resolved after 25 mg push of Cardizem.    Acute HFmrEF  CP  Long term persistent A-fib  Obesity               PLAN:    ·	EKG on admission: A-fib 71/min. Q waves in V1-V2. RAD (Interpreted by me)  ·	Troponin: 20-->25-->34  ·	ECHO reviewed. EF is 45-50%  ·	Pro-BNP 1975-->4592  ·	Hold diuretics today. On d/c Torsemide 20 mg po daily  ·	Overdiuresis. Will give her  cc/hr x 500cc.   ·	Meclizine PRN. Give first dose now.   ·	Low salt diet and water restriction to 1.5 L/D  ·	Serum iron is 33 and percent sat is 10. Received 1 gm of IV Venofer over 5 days.   ·	On GDMT. Cont Losartan and Metoprolol Succinate. Cont Metoprolol 50 mg po q 8h. On discharge switch her to Metoprolol Succinate 150 mg po daily  ·	Cardiology eval noted. Cont the current management  ·	Old record reviewed. Had CCTA done 6/4/24. Extensive motion artifact.   ·	TSH is 34. Increase Synthroid to 125 mcg po daily  ·	Orthostatic vitals are negative. RA POX is 94%  ·	D/C home    * Med rec reviewed. Plan of care d/w the pt and her son on the bedside. Time spent 36 minutes.

## 2024-07-18 DIAGNOSIS — R06.01 ORTHOPNEA: ICD-10-CM

## 2024-07-18 DIAGNOSIS — I08.2 RHEUMATIC DISORDERS OF BOTH AORTIC AND TRICUSPID VALVES: ICD-10-CM

## 2024-07-18 DIAGNOSIS — R42 DIZZINESS AND GIDDINESS: ICD-10-CM

## 2024-07-18 DIAGNOSIS — E66.9 OBESITY, UNSPECIFIED: ICD-10-CM

## 2024-07-18 DIAGNOSIS — I27.20 PULMONARY HYPERTENSION, UNSPECIFIED: ICD-10-CM

## 2024-07-18 DIAGNOSIS — M19.90 UNSPECIFIED OSTEOARTHRITIS, UNSPECIFIED SITE: ICD-10-CM

## 2024-07-18 DIAGNOSIS — E03.9 HYPOTHYROIDISM, UNSPECIFIED: ICD-10-CM

## 2024-07-18 DIAGNOSIS — I50.21 ACUTE SYSTOLIC (CONGESTIVE) HEART FAILURE: ICD-10-CM

## 2024-07-18 DIAGNOSIS — K21.9 GASTRO-ESOPHAGEAL REFLUX DISEASE WITHOUT ESOPHAGITIS: ICD-10-CM

## 2024-07-18 DIAGNOSIS — I11.0 HYPERTENSIVE HEART DISEASE WITH HEART FAILURE: ICD-10-CM

## 2024-07-18 DIAGNOSIS — I48.11 LONGSTANDING PERSISTENT ATRIAL FIBRILLATION: ICD-10-CM

## 2024-07-24 ENCOUNTER — EMERGENCY (EMERGENCY)
Facility: HOSPITAL | Age: 79
LOS: 0 days | Discharge: HOME | End: 2024-07-24
Attending: EMERGENCY MEDICINE
Payer: MEDICARE

## 2024-07-24 VITALS
HEART RATE: 82 BPM | SYSTOLIC BLOOD PRESSURE: 140 MMHG | HEIGHT: 62 IN | DIASTOLIC BLOOD PRESSURE: 95 MMHG | OXYGEN SATURATION: 97 % | WEIGHT: 201.94 LBS | TEMPERATURE: 98 F | RESPIRATION RATE: 19 BRPM

## 2024-07-24 DIAGNOSIS — Z98.890 OTHER SPECIFIED POSTPROCEDURAL STATES: Chronic | ICD-10-CM
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
## 2024-07-24 DIAGNOSIS — S09.90XA UNSPECIFIED INJURY OF HEAD, INITIAL ENCOUNTER: ICD-10-CM
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
## 2024-07-24 DIAGNOSIS — R51.9 HEADACHE, UNSPECIFIED: ICD-10-CM

## 2024-07-24 PROCEDURE — 99284 EMERGENCY DEPT VISIT MOD MDM: CPT | Mod: 25

## 2024-07-24 PROCEDURE — 70450 CT HEAD/BRAIN W/O DYE: CPT | Mod: MC

## 2024-07-24 PROCEDURE — 72125 CT NECK SPINE W/O DYE: CPT | Mod: 26,MC

## 2024-07-24 PROCEDURE — 72125 CT NECK SPINE W/O DYE: CPT | Mod: MC

## 2024-07-24 PROCEDURE — 99284 EMERGENCY DEPT VISIT MOD MDM: CPT

## 2024-07-24 PROCEDURE — 70450 CT HEAD/BRAIN W/O DYE: CPT | Mod: 26,MC

## 2024-07-24 NOTE — ED PROVIDER NOTE - PATIENT PORTAL LINK FT
You can access the FollowMyHealth Patient Portal offered by [facility] by registering at the following website: [email or web address]. By joining [employer]’s FollowMyHealth portal, you will also be able to view your health information using other applications (apps) compatible with our system.

## 2024-07-24 NOTE — ED PROVIDER NOTE - OBJECTIVE STATEMENT
this is a 78 yo female presents to ed for evaluation . patient had trip and fall at home 4 days ago. patient visiting nurse wanted her to come to ed for ct . patient is taking blood thinners.

## 2024-07-24 NOTE — ED PROVIDER NOTE - PHYSICAL EXAMINATION
--EXAM--  VITAL SIGNS: I have reviewed vs documented at present.  CONSTITUTIONAL: Well-developed; well-nourished; in no acute distress.   SKIN: Warm and dry, no acute rash.   HEAD: Normocephalic; atraumatic.  EYES: PERRL, EOM intact; conjunctiva and sclera clear. No nystagmus.  ENT: No nasal discharge; airway clear.  NECK: Supple; non tender.  CARD: S1, S2, Regular rate and rhythm.   RESP: No wheezes, rales or rhonchi.    NEURO: Alert, oriented, grossly unremarkable. Strength 5/5 in all extremities. Sensation intact throughout.

## 2024-07-24 NOTE — ED ADULT NURSE NOTE - NSICDXPASTMEDICALHX_GEN_ALL_CORE_FT
PAST MEDICAL HISTORY:  Cataract     GERD (gastroesophageal reflux disease)     H/O insomnia     HTN (hypertension)     Hypothyroid     Obese     Osteoarthritis feet    Vertigo      hide

## 2024-07-24 NOTE — ED ADULT TRIAGE NOTE - CHIEF COMPLAINT QUOTE
pt tripped and fell 3 days ago. Pt does not know if she hit her head. Pt is on ELiquis. Pt been c/o headache since then as per son.

## 2024-07-24 NOTE — ED PROVIDER NOTE - ATTENDING APP SHARED VISIT CONTRIBUTION OF CARE
79-year-old female presents with her son for evaluation of head injury s/p fall 4 days ago.  Son states that patient tripped and hit her head.  No LOC.  Visiting nurse came today and recommended patient come in for CT scan since she is on blood thinner.  On exam patient in NAD, AAOx3, GCS 15, no evidence of head trauma, no raccoon or soto, mild soreness to neck, extremities atraumatic with good range of motion, hips nontender, no midline vertebral tenderness or step-off

## 2024-07-24 NOTE — ED ADULT NURSE NOTE - NSFALLUNIVINTERV_ED_ALL_ED
Bed/Stretcher in lowest position, wheels locked, appropriate side rails in place/Call bell, personal items and telephone in reach/Instruct patient to call for assistance before getting out of bed/chair/stretcher/Non-slip footwear applied when patient is off stretcher/[place] to call system/Physically safe environment - no spills, clutter or unnecessary equipment/Purposeful proactive rounding/Room/bathroom lighting operational, light cord in reach

## 2024-07-24 NOTE — ED PROVIDER NOTE - CLINICAL SUMMARY MEDICAL DECISION MAKING FREE TEXT BOX
Imaging obtained.  Results reviewed and discussed with patient and son.  Head injury instructions discussed.  Patient will follow-up with PMD return if any worsening symptoms or concerns

## 2024-10-03 ENCOUNTER — EMERGENCY (EMERGENCY)
Facility: HOSPITAL | Age: 79
LOS: 0 days | Discharge: HOME | End: 2024-10-04
Attending: EMERGENCY MEDICINE
Payer: MEDICARE

## 2024-10-03 VITALS
HEART RATE: 60 BPM | DIASTOLIC BLOOD PRESSURE: 73 MMHG | SYSTOLIC BLOOD PRESSURE: 112 MMHG | OXYGEN SATURATION: 99 % | RESPIRATION RATE: 20 BRPM

## 2024-10-03 VITALS
DIASTOLIC BLOOD PRESSURE: 84 MMHG | OXYGEN SATURATION: 99 % | WEIGHT: 205.91 LBS | TEMPERATURE: 98 F | SYSTOLIC BLOOD PRESSURE: 136 MMHG | HEIGHT: 62 IN | HEART RATE: 88 BPM | RESPIRATION RATE: 18 BRPM

## 2024-10-03 DIAGNOSIS — I27.20 PULMONARY HYPERTENSION, UNSPECIFIED: ICD-10-CM

## 2024-10-03 DIAGNOSIS — H59.029 CATARACT (LENS) FRAGMENTS IN EYE FOLLOWING CATARACT SURGERY, UNSPECIFIED EYE: ICD-10-CM
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
## 2024-10-03 DIAGNOSIS — N39.0 URINARY TRACT INFECTION, SITE NOT SPECIFIED: ICD-10-CM

## 2024-10-03 DIAGNOSIS — S20.222A CONTUSION OF LEFT BACK WALL OF THORAX, INITIAL ENCOUNTER: ICD-10-CM

## 2024-10-03 DIAGNOSIS — R51.9 HEADACHE, UNSPECIFIED: ICD-10-CM

## 2024-10-03 DIAGNOSIS — Z98.890 OTHER SPECIFIED POSTPROCEDURAL STATES: Chronic | ICD-10-CM

## 2024-10-03 DIAGNOSIS — I35.0 NONRHEUMATIC AORTIC (VALVE) STENOSIS: ICD-10-CM

## 2024-10-03 DIAGNOSIS — K31.9 DISEASE OF STOMACH AND DUODENUM, UNSPECIFIED: ICD-10-CM

## 2024-10-03 DIAGNOSIS — R42 DIZZINESS AND GIDDINESS: ICD-10-CM

## 2024-10-03 DIAGNOSIS — E03.9 HYPOTHYROIDISM, UNSPECIFIED: ICD-10-CM

## 2024-10-03 DIAGNOSIS — Z79.01 LONG TERM (CURRENT) USE OF ANTICOAGULANTS: ICD-10-CM

## 2024-10-03 DIAGNOSIS — I48.91 UNSPECIFIED ATRIAL FIBRILLATION: ICD-10-CM

## 2024-10-03 DIAGNOSIS — I11.0 HYPERTENSIVE HEART DISEASE WITH HEART FAILURE: ICD-10-CM

## 2024-10-03 DIAGNOSIS — I50.30 UNSPECIFIED DIASTOLIC (CONGESTIVE) HEART FAILURE: ICD-10-CM
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
## 2024-10-03 LAB
ALBUMIN SERPL ELPH-MCNC: 4.4 G/DL — SIGNIFICANT CHANGE UP (ref 3.5–5.2)
ALP SERPL-CCNC: 65 U/L — SIGNIFICANT CHANGE UP (ref 30–115)
ALT FLD-CCNC: 11 U/L — SIGNIFICANT CHANGE UP (ref 0–41)
ANION GAP SERPL CALC-SCNC: 13 MMOL/L — SIGNIFICANT CHANGE UP (ref 7–14)
APPEARANCE UR: ABNORMAL
APTT BLD: 42 SEC — HIGH (ref 27–39.2)
AST SERPL-CCNC: 34 U/L — SIGNIFICANT CHANGE UP (ref 0–41)
BACTERIA # UR AUTO: ABNORMAL /HPF
BASOPHILS # BLD AUTO: 0.07 K/UL — SIGNIFICANT CHANGE UP (ref 0–0.2)
BASOPHILS NFR BLD AUTO: 0.8 % — SIGNIFICANT CHANGE UP (ref 0–1)
BILIRUB SERPL-MCNC: 0.6 MG/DL — SIGNIFICANT CHANGE UP (ref 0.2–1.2)
BILIRUB UR-MCNC: NEGATIVE — SIGNIFICANT CHANGE UP
BUN SERPL-MCNC: 34 MG/DL — HIGH (ref 10–20)
CALCIUM SERPL-MCNC: 9.5 MG/DL — SIGNIFICANT CHANGE UP (ref 8.4–10.5)
CHLORIDE SERPL-SCNC: 93 MMOL/L — LOW (ref 98–110)
CO2 SERPL-SCNC: 28 MMOL/L — SIGNIFICANT CHANGE UP (ref 17–32)
COLOR SPEC: YELLOW — SIGNIFICANT CHANGE UP
CREAT SERPL-MCNC: 1.1 MG/DL — SIGNIFICANT CHANGE UP (ref 0.7–1.5)
DIFF PNL FLD: NEGATIVE — SIGNIFICANT CHANGE UP
EGFR: 50 ML/MIN/1.73M2 — LOW
EOSINOPHIL # BLD AUTO: 0.18 K/UL — SIGNIFICANT CHANGE UP (ref 0–0.7)
EOSINOPHIL NFR BLD AUTO: 2.1 % — SIGNIFICANT CHANGE UP (ref 0–8)
EPI CELLS # UR: PRESENT
GLUCOSE SERPL-MCNC: 103 MG/DL — HIGH (ref 70–99)
GLUCOSE UR QL: NEGATIVE MG/DL — SIGNIFICANT CHANGE UP
HCT VFR BLD CALC: 44.7 % — SIGNIFICANT CHANGE UP (ref 37–47)
HGB BLD-MCNC: 15 G/DL — SIGNIFICANT CHANGE UP (ref 12–16)
HYALINE CASTS # UR AUTO: PRESENT
IMM GRANULOCYTES NFR BLD AUTO: 0.2 % — SIGNIFICANT CHANGE UP (ref 0.1–0.3)
INR BLD: 2.49 RATIO — HIGH (ref 0.65–1.3)
KETONES UR-MCNC: NEGATIVE MG/DL — SIGNIFICANT CHANGE UP
LACTATE SERPL-SCNC: 0.7 MMOL/L — SIGNIFICANT CHANGE UP (ref 0.7–2)
LEUKOCYTE ESTERASE UR-ACNC: ABNORMAL
LIDOCAIN IGE QN: 27 U/L — SIGNIFICANT CHANGE UP (ref 7–60)
LYMPHOCYTES # BLD AUTO: 2.44 K/UL — SIGNIFICANT CHANGE UP (ref 1.2–3.4)
LYMPHOCYTES # BLD AUTO: 28.1 % — SIGNIFICANT CHANGE UP (ref 20.5–51.1)
MCHC RBC-ENTMCNC: 29.6 PG — SIGNIFICANT CHANGE UP (ref 27–31)
MCHC RBC-ENTMCNC: 33.6 G/DL — SIGNIFICANT CHANGE UP (ref 32–37)
MCV RBC AUTO: 88.2 FL — SIGNIFICANT CHANGE UP (ref 81–99)
MONOCYTES # BLD AUTO: 0.69 K/UL — HIGH (ref 0.1–0.6)
MONOCYTES NFR BLD AUTO: 7.9 % — SIGNIFICANT CHANGE UP (ref 1.7–9.3)
NEUTROPHILS # BLD AUTO: 5.28 K/UL — SIGNIFICANT CHANGE UP (ref 1.4–6.5)
NEUTROPHILS NFR BLD AUTO: 60.9 % — SIGNIFICANT CHANGE UP (ref 42.2–75.2)
NITRITE UR-MCNC: NEGATIVE — SIGNIFICANT CHANGE UP
NRBC # BLD: 0 /100 WBCS — SIGNIFICANT CHANGE UP (ref 0–0)
PH UR: 6 — SIGNIFICANT CHANGE UP (ref 5–8)
PLATELET # BLD AUTO: 214 K/UL — SIGNIFICANT CHANGE UP (ref 130–400)
PMV BLD: 9.4 FL — SIGNIFICANT CHANGE UP (ref 7.4–10.4)
POTASSIUM SERPL-MCNC: 4.7 MMOL/L — SIGNIFICANT CHANGE UP (ref 3.5–5)
POTASSIUM SERPL-SCNC: 4.7 MMOL/L — SIGNIFICANT CHANGE UP (ref 3.5–5)
PROT SERPL-MCNC: 7.7 G/DL — SIGNIFICANT CHANGE UP (ref 6–8)
PROT UR-MCNC: SIGNIFICANT CHANGE UP MG/DL
PROTHROM AB SERPL-ACNC: 28.6 SEC — HIGH (ref 9.95–12.87)
RBC # BLD: 5.07 M/UL — SIGNIFICANT CHANGE UP (ref 4.2–5.4)
RBC # FLD: 15.1 % — HIGH (ref 11.5–14.5)
RBC CASTS # UR COMP ASSIST: 2 /HPF — SIGNIFICANT CHANGE UP (ref 0–4)
SODIUM SERPL-SCNC: 134 MMOL/L — LOW (ref 135–146)
SP GR SPEC: 1.02 — SIGNIFICANT CHANGE UP (ref 1–1.03)
SQUAMOUS # UR AUTO: 15 /HPF — HIGH (ref 0–5)
TROPONIN T, HIGH SENSITIVITY RESULT: 15 NG/L — HIGH (ref 6–13)
TROPONIN T, HIGH SENSITIVITY RESULT: 19 NG/L — HIGH (ref 6–13)
UROBILINOGEN FLD QL: 0.2 MG/DL — SIGNIFICANT CHANGE UP (ref 0.2–1)
WBC # BLD: 8.68 K/UL — SIGNIFICANT CHANGE UP (ref 4.8–10.8)
WBC # FLD AUTO: 8.68 K/UL — SIGNIFICANT CHANGE UP (ref 4.8–10.8)
WBC UR QL: 5 /HPF — SIGNIFICANT CHANGE UP (ref 0–5)

## 2024-10-03 PROCEDURE — 93010 ELECTROCARDIOGRAM REPORT: CPT

## 2024-10-03 PROCEDURE — 71045 X-RAY EXAM CHEST 1 VIEW: CPT

## 2024-10-03 PROCEDURE — 72170 X-RAY EXAM OF PELVIS: CPT | Mod: 26

## 2024-10-03 PROCEDURE — 70450 CT HEAD/BRAIN W/O DYE: CPT | Mod: MC

## 2024-10-03 PROCEDURE — 71260 CT THORAX DX C+: CPT | Mod: MC

## 2024-10-03 PROCEDURE — 99285 EMERGENCY DEPT VISIT HI MDM: CPT | Mod: 25

## 2024-10-03 PROCEDURE — 72125 CT NECK SPINE W/O DYE: CPT | Mod: MC

## 2024-10-03 PROCEDURE — 84484 ASSAY OF TROPONIN QUANT: CPT

## 2024-10-03 PROCEDURE — 70450 CT HEAD/BRAIN W/O DYE: CPT | Mod: 26,MC

## 2024-10-03 PROCEDURE — 99285 EMERGENCY DEPT VISIT HI MDM: CPT

## 2024-10-03 PROCEDURE — 83605 ASSAY OF LACTIC ACID: CPT

## 2024-10-03 PROCEDURE — 74177 CT ABD & PELVIS W/CONTRAST: CPT | Mod: MC

## 2024-10-03 PROCEDURE — 72125 CT NECK SPINE W/O DYE: CPT | Mod: 26,MC

## 2024-10-03 PROCEDURE — 80053 COMPREHEN METABOLIC PANEL: CPT

## 2024-10-03 PROCEDURE — 74177 CT ABD & PELVIS W/CONTRAST: CPT | Mod: 26,MC

## 2024-10-03 PROCEDURE — 85730 THROMBOPLASTIN TIME PARTIAL: CPT

## 2024-10-03 PROCEDURE — 85610 PROTHROMBIN TIME: CPT

## 2024-10-03 PROCEDURE — 71260 CT THORAX DX C+: CPT | Mod: 26,MC

## 2024-10-03 PROCEDURE — 81001 URINALYSIS AUTO W/SCOPE: CPT

## 2024-10-03 PROCEDURE — 83690 ASSAY OF LIPASE: CPT

## 2024-10-03 PROCEDURE — 85025 COMPLETE CBC W/AUTO DIFF WBC: CPT

## 2024-10-03 PROCEDURE — 71045 X-RAY EXAM CHEST 1 VIEW: CPT | Mod: 26

## 2024-10-03 PROCEDURE — 93005 ELECTROCARDIOGRAM TRACING: CPT

## 2024-10-03 PROCEDURE — 36415 COLL VENOUS BLD VENIPUNCTURE: CPT

## 2024-10-03 PROCEDURE — 72170 X-RAY EXAM OF PELVIS: CPT

## 2024-10-03 RX ORDER — METOLAZONE 5 MG
1 TABLET ORAL
Refills: 0 | DISCHARGE

## 2024-10-03 RX ORDER — SODIUM CHLORIDE 9 MG/ML
250 INJECTION INTRAMUSCULAR; INTRAVENOUS; SUBCUTANEOUS ONCE
Refills: 0 | Status: COMPLETED | OUTPATIENT
Start: 2024-10-03 | End: 2024-10-03

## 2024-10-03 RX ORDER — CEFPODOXIME PROXETIL 100 MG/5ML
1 GRANULE, FOR SUSPENSION ORAL
Qty: 14 | Refills: 0
Start: 2024-10-03 | End: 2024-10-09

## 2024-10-03 RX ADMIN — SODIUM CHLORIDE 250 MILLILITER(S): 9 INJECTION INTRAMUSCULAR; INTRAVENOUS; SUBCUTANEOUS at 19:10

## 2024-10-03 NOTE — ED PROVIDER NOTE - PATIENT PORTAL LINK FT
You can access the FollowMyHealth Patient Portal offered by [facility] by registering at the following website: [email or web address]. By joining [employer]’s FollowMyHealth portal, you will also be able to view your health information using other applications (apps) compatible with our system.

## 2024-10-03 NOTE — ED ADULT NURSE NOTE - NSFALLUNIVINTERV_ED_ALL_ED
Bed/Stretcher in lowest position, wheels locked, appropriate side rails in place/Call bell, personal items and telephone in reach/Instruct patient to call for assistance before getting out of bed/chair/stretcher/Non-slip footwear applied when patient is off stretcher/[place] to call system/Physically safe environment - no spills, clutter or unnecessary equipment/Purposeful proactive rounding/Room/bathroom lighting operational, light cord in reach

## 2024-10-03 NOTE — ED ADULT TRIAGE NOTE - CHIEF COMPLAINT QUOTE
as per son patient fell two weeks ago; since than has tremors and headaches on and off; deniess head trauma

## 2024-10-03 NOTE — ED PROVIDER NOTE - NSFOLLOWUPINSTRUCTIONS_ED_ALL_ED_FT
9/11/2024                   Delilah Brumfield  [address] [address]      Dear Delilah,    We are writing to inform you that you are due for your annual Medicare Wellness Exam.    This appointment will focus on preventive care including a Personalized Prevention Plan, a check of your weight, blood pressure and pulse, a brief check of your hearing and vision, any immunizations you may need, as well as referrals for any other screening services or test you may need.    Medicare will cover the cost of the preventive care services.  Any additional services including lab and some immunizations may not be a covered service.  If you have any other health concerns or questions to discuss with your provider, a medical visit may be added to your wellness visit and you may need to pay a deductible or co-pay depending on your Medicare plan.    Please call our office at [phone] to schedule your appointment with Tati Weiss MD .    We look forward to seeing you soon.    Tati Weiss MD  [address] [id number] [phone]   Urinary Tract Infection    A urinary tract infection (UTI) is an infection of any part of the urinary tract, which includes the kidneys, ureters, bladder, and urethra. Risk factors include ignoring your need to urinate, wiping back to front if female, being an uncircumcised male, and having diabetes or a weak immune system. Symptoms include frequent urination, pain or burning with urination, foul smelling urine, cloudy urine, pain in the lower abdomen, blood in the urine, and fever. If you were prescribed an antibiotic medicine, take it as told by your health care provider. Do not stop taking the antibiotic even if you start to feel better.    SEEK IMMEDIATE MEDICAL CARE IF YOU HAVE ANY OF THE FOLLOWING SYMPTOMS: severe back or abdominal pain, fever, inability to keep fluids or medicine down, dizziness/lightheadedness, or a change in mental status.

## 2024-10-03 NOTE — ED PROVIDER NOTE - CLINICAL SUMMARY MEDICAL DECISION MAKING FREE TEXT BOX
. Patient is an 79-year-old female presents for evaluation of headache status post fall approximately 1 week ago described as mild to moderate throbbing nature she denies any visual changes neck pain chest pain shortness of breath abdominal pain fevers chills dysuria hesitancy or frequency    On physical exam patient is normocephalic atraumatic pupils equally round react light accommodation extraocular muscles intact oropharynx clear no evidence of Bruno sign raccoon eyes septal hematoma or hemotympanum noted regular rate and rhythm S1-S2 abdomen soft nontender nondistended bowel sounds positive no guarding rebound extremities full range of motion pedal pulse 2+ radial pulse 2+ normal strength normal range of motion patient does have evidence of bruising to the upper left mid back no tenderness to palpation midline CT or L-spine radial pulses 2+ pedal pulses 2+    Assessment plan secondary this patient's complaint we obtain EKG per my independent valuation not consistent with STEMI not consistent with QT prolongation not consistent with arrhythmia we obtained chest x-ray provide pain evaluation not distant with pneumothorax or pneumonia we obtained pelvis x-ray provide pain evaluation not consistent with fractures considering this patient's age and recent fall we also obtained CT head C-spine chest abdomen pelvis no signs of acute abnormalities patient improved here in the emergency department I will discharge follow-up to in the next 24 to 48 hours or return to the emergency department for any further concerns

## 2024-10-03 NOTE — ED ADULT NURSE NOTE - CAS EDN DISCHARGE ASSESSMENT
Received pt AOx1, calling out \"CJ\" repeatedly, attempted to calm and reorient pt. Pt in 3 point restraints as documented. UO noted on koffi night shift RN also reported that pt had UO at 2am, unable to measure due to pts external catheter coming off. Will continue to monitor.    Alert and oriented to person, place and time

## 2024-10-03 NOTE — ED PROVIDER NOTE - PHYSICAL EXAMINATION
generalized: comfortable, alert , normocephalic  eyes: PERRLA, EOMI, no orbital tenderness, no bony step off  ENT: no septal hematoma, no nasal bone tenderness, no dental injury, no gum swelling, no tongue swelling and uvula midline, oropharynx clear,   resp: CTA, no bony step off , no chest wall tenderness, no bruising to chest wall  cardiac: RRR S1S2  abd: non tender RUQ or LUQ, non distended, no bruising   msk: neck supple, no cervical tenderness, pelvis stable , no vertebral tenderness- flexion and extension in tact, gait steady, upper extremities - good rom no tenderness, lower extremities- good rom , no tenderness  skin: no lacerations, +left midback  bruising   neuro: alert and oriented, follows commands, no sensory or motor deficits

## 2024-10-03 NOTE — ED PROVIDER NOTE - OBJECTIVE STATEMENT
79-year-old female with past medical history of cataracts, hypothyroidism, vertigo, insomnia, GERD, HTN, HFpEF, A-fib on Eliquis, aortic stenosis and pulmonary hypertension presents to the ED s/p fall one week ago. patient with bruising to left mid back. no sob, chest pain . denies any neck or back pain . patient with headaches and lightheadedness. no palpitations. no weakness or tingling to extremities. no vomiting or diarrhea.

## 2024-10-03 NOTE — ED ADULT NURSE NOTE - TEMPLATE LIST FOR HEAD TO TOE ASSESSMENT
Impression: S/P Cataract Extraction by phacoemulsification with IOL placement; Femto (LenSx); ORA OS - 1 Day. Encounter for surgical aftercare following surgery on a sense organ  Z48.810. Excellent post op course   Post operative instructions reviewed - Condition is improving - Plan: The surgical eye(s) is improving well. Continue to follow current drop plan and post operative instructions. Recommend artificial tears throughout post operative period. RTC for scheduled follow up. --Continue Pred-Moxi-Nepaf as directed. Neuro

## 2024-10-03 NOTE — ED PROVIDER NOTE - ATTENDING APP SHARED VISIT CONTRIBUTION OF CARE
Started vomiting this AM, c/o body cramping. Pt was out of town for , didn't have his medication for 3 days gabapentin.   
131
I have personally performed a history and physical exam on this patient and personally directed the management of the patient. Patient is an 79-year-old female presents for evaluation of headache status post fall approximately 1 week ago described as mild to moderate throbbing nature she denies any visual changes neck pain chest pain shortness of breath abdominal pain fevers chills dysuria hesitancy or frequency    On physical exam patient is normocephalic atraumatic pupils equally round react light accommodation extraocular muscles intact oropharynx clear no evidence of Bruno sign raccoon eyes septal hematoma or hemotympanum noted regular rate and rhythm S1-S2 abdomen soft nontender nondistended bowel sounds positive no guarding rebound extremities full range of motion pedal pulse 2+ radial pulse 2+ normal strength normal range of motion patient does have evidence of bruising to the upper left mid back no tenderness to palpation midline CT or L-spine radial pulses 2+ pedal pulses 2+    Assessment plan secondary this patient's complaint we obtain EKG per my independent valuation not consistent with STEMI not consistent with QT prolongation not consistent with arrhythmia we obtained chest x-ray provide pain evaluation not distant with pneumothorax or pneumonia we obtained pelvis x-ray provide pain evaluation not consistent with fractures considering this patient's age and recent fall we also obtained CT head C-spine chest abdomen pelvis no signs of acute abnormalities patient improved here in the emergency department I will discharge follow-up to in the next 24 to 48 hours or return to the emergency department for any further concerns